# Patient Record
Sex: MALE | Race: BLACK OR AFRICAN AMERICAN | NOT HISPANIC OR LATINO | Employment: OTHER | URBAN - METROPOLITAN AREA
[De-identification: names, ages, dates, MRNs, and addresses within clinical notes are randomized per-mention and may not be internally consistent; named-entity substitution may affect disease eponyms.]

---

## 2019-08-12 DIAGNOSIS — I50.9 HEART FAILURE, UNSPECIFIED: Primary | ICD-10-CM

## 2019-08-12 DIAGNOSIS — I48.0 PAROXYSMAL ATRIAL FIBRILLATION: ICD-10-CM

## 2019-08-20 ENCOUNTER — HOSPITAL ENCOUNTER (OUTPATIENT)
Dept: RADIOLOGY | Facility: HOSPITAL | Age: 69
Discharge: HOME | End: 2019-08-20
Attending: INTERNAL MEDICINE
Payer: MEDICARE

## 2019-08-20 DIAGNOSIS — I48.0 PAROXYSMAL ATRIAL FIBRILLATION: ICD-10-CM

## 2019-08-20 DIAGNOSIS — I50.9 HEART FAILURE, UNSPECIFIED: ICD-10-CM

## 2019-08-20 PROCEDURE — 78472 GATED HEART PLANAR SINGLE: CPT | Mod: TC

## 2019-09-12 ENCOUNTER — HOSPITAL ENCOUNTER (OUTPATIENT)
Dept: PREADMISSION TESTING | Facility: HOSPITAL | Age: 69
Discharge: HOME | End: 2019-09-12
Attending: INTERNAL MEDICINE
Payer: MEDICARE

## 2019-09-12 VITALS
WEIGHT: 150 LBS | RESPIRATION RATE: 18 BRPM | HEIGHT: 68 IN | BODY MASS INDEX: 22.73 KG/M2 | SYSTOLIC BLOOD PRESSURE: 137 MMHG | HEART RATE: 58 BPM | OXYGEN SATURATION: 98 % | DIASTOLIC BLOOD PRESSURE: 83 MMHG

## 2019-09-12 DIAGNOSIS — I42.9 CARDIOMYOPATHY: Primary | ICD-10-CM

## 2019-09-12 LAB
ANION GAP SERPL CALC-SCNC: 6 MMOL/L (ref 8–16)
APTT PPP: 36.6 SEC (ref 26.2–34.7)
BASOPHILS # BLD AUTO: 0.03 K/UL (ref 0–0.2)
BASOPHILS NFR BLD: 1 % (ref 0–1.9)
BUN SERPL-MCNC: 16 MG/DL (ref 8–23)
CALCIUM SERPL-MCNC: 8.1 MG/DL (ref 8.7–10.5)
CHLORIDE SERPL-SCNC: 107 MMOL/L (ref 95–110)
CO2 SERPL-SCNC: 25 MMOL/L (ref 23–29)
CREAT SERPL-MCNC: 0.9 MG/DL (ref 0.5–1.4)
DIFFERENTIAL METHOD: ABNORMAL
EOSINOPHIL # BLD AUTO: 0.1 K/UL (ref 0–0.5)
EOSINOPHIL NFR BLD: 4.2 % (ref 0–8)
ERYTHROCYTE [DISTWIDTH] IN BLOOD BY AUTOMATED COUNT: 14.4 % (ref 11.5–14.5)
EST. GFR  (AFRICAN AMERICAN): >60 ML/MIN/1.73 M^2
EST. GFR  (NON AFRICAN AMERICAN): >60 ML/MIN/1.73 M^2
GLUCOSE SERPL-MCNC: 130 MG/DL (ref 70–110)
HCT VFR BLD AUTO: 40 % (ref 40–54)
HGB BLD-MCNC: 12.1 G/DL (ref 14–18)
IMM GRANULOCYTES # BLD AUTO: 0.01 K/UL (ref 0–0.04)
IMM GRANULOCYTES NFR BLD AUTO: 0.3 % (ref 0–0.5)
INR PPP: 1.2
LYMPHOCYTES # BLD AUTO: 0.4 K/UL (ref 1–4.8)
LYMPHOCYTES NFR BLD: 13.5 % (ref 18–48)
MAGNESIUM SERPL-MCNC: 1.9 MG/DL (ref 1.6–2.6)
MCH RBC QN AUTO: 23.9 PG (ref 27–31)
MCHC RBC AUTO-ENTMCNC: 30.3 G/DL (ref 32–36)
MCV RBC AUTO: 79 FL (ref 82–98)
MONOCYTES # BLD AUTO: 0.3 K/UL (ref 0.3–1)
MONOCYTES NFR BLD: 10.6 % (ref 4–15)
NEUTROPHILS # BLD AUTO: 2.2 K/UL (ref 1.8–7.7)
NEUTROPHILS NFR BLD: 70.4 % (ref 38–73)
NRBC BLD-RTO: 0 /100 WBC
PLATELET # BLD AUTO: 204 K/UL (ref 150–350)
PMV BLD AUTO: 10.8 FL (ref 9.2–12.9)
POTASSIUM SERPL-SCNC: 3.5 MMOL/L (ref 3.5–5.1)
PROTHROMBIN TIME: 14.5 SEC (ref 11.7–14)
RBC # BLD AUTO: 5.07 M/UL (ref 4.6–6.2)
SODIUM SERPL-SCNC: 138 MMOL/L (ref 136–145)
WBC # BLD AUTO: 3.11 K/UL (ref 3.9–12.7)

## 2019-09-12 PROCEDURE — 80048 BASIC METABOLIC PNL TOTAL CA: CPT

## 2019-09-12 PROCEDURE — 85730 THROMBOPLASTIN TIME PARTIAL: CPT | Mod: GZ

## 2019-09-12 PROCEDURE — 83735 ASSAY OF MAGNESIUM: CPT

## 2019-09-12 PROCEDURE — 36415 COLL VENOUS BLD VENIPUNCTURE: CPT

## 2019-09-12 PROCEDURE — 85610 PROTHROMBIN TIME: CPT

## 2019-09-12 PROCEDURE — 85025 COMPLETE CBC W/AUTO DIFF WBC: CPT

## 2019-09-16 ENCOUNTER — HOSPITAL ENCOUNTER (OUTPATIENT)
Facility: HOSPITAL | Age: 69
Discharge: HOME | End: 2019-09-17
Attending: INTERNAL MEDICINE | Admitting: INTERNAL MEDICINE
Payer: MEDICARE

## 2019-09-16 DIAGNOSIS — I42.9 CARDIOMYOPATHY, UNSPECIFIED TYPE: ICD-10-CM

## 2019-09-16 DIAGNOSIS — I48.91 ATRIAL FIBRILLATION, UNSPECIFIED TYPE: ICD-10-CM

## 2019-09-16 DIAGNOSIS — I25.10 CORONARY ARTERY DISEASE, ANGINA PRESENCE UNSPECIFIED, UNSPECIFIED VESSEL OR LESION TYPE, UNSPECIFIED WHETHER NATIVE OR TRANSPLANTED HEART: Primary | ICD-10-CM

## 2019-09-16 LAB
POC ACTIVATED CLOTTING TIME K: 230 SEC (ref 74–137)
POC ACTIVATED CLOTTING TIME K: 263 SEC (ref 74–137)
SAMPLE: ABNORMAL
SAMPLE: ABNORMAL

## 2019-09-16 PROCEDURE — 93454 CORONARY ARTERY ANGIO S&I: CPT | Mod: XU | Performed by: INTERNAL MEDICINE

## 2019-09-16 PROCEDURE — 27800903 OPTIME MED/SURG SUP & DEVICES OTHER IMPLANTS: Performed by: INTERNAL MEDICINE

## 2019-09-16 PROCEDURE — C1894 INTRO/SHEATH, NON-LASER: HCPCS | Performed by: INTERNAL MEDICINE

## 2019-09-16 PROCEDURE — 25000003 PHARM REV CODE 250: Performed by: INTERNAL MEDICINE

## 2019-09-16 PROCEDURE — C1769 GUIDE WIRE: HCPCS | Performed by: INTERNAL MEDICINE

## 2019-09-16 PROCEDURE — C1887 CATHETER, GUIDING: HCPCS | Performed by: INTERNAL MEDICINE

## 2019-09-16 PROCEDURE — C1725 CATH, TRANSLUMIN NON-LASER: HCPCS | Performed by: INTERNAL MEDICINE

## 2019-09-16 PROCEDURE — 63600175 PHARM REV CODE 636 W HCPCS: Performed by: INTERNAL MEDICINE

## 2019-09-16 PROCEDURE — C9601 PERC DRUG-EL COR STENT BRAN: HCPCS | Performed by: INTERNAL MEDICINE

## 2019-09-16 PROCEDURE — C9600 PERC DRUG-EL COR STENT SING: HCPCS | Performed by: INTERNAL MEDICINE

## 2019-09-16 PROCEDURE — 93571 IV DOP VEL&/PRESS C FLO 1ST: CPT | Performed by: INTERNAL MEDICINE

## 2019-09-16 PROCEDURE — 99153 MOD SED SAME PHYS/QHP EA: CPT | Performed by: INTERNAL MEDICINE

## 2019-09-16 PROCEDURE — C1874 STENT, COATED/COV W/DEL SYS: HCPCS | Performed by: INTERNAL MEDICINE

## 2019-09-16 PROCEDURE — 94761 N-INVAS EAR/PLS OXIMETRY MLT: CPT

## 2019-09-16 PROCEDURE — C1760 CLOSURE DEV, VASC: HCPCS | Performed by: INTERNAL MEDICINE

## 2019-09-16 PROCEDURE — 99152 MOD SED SAME PHYS/QHP 5/>YRS: CPT | Performed by: INTERNAL MEDICINE

## 2019-09-16 DEVICE — DEVICE CLOSURE VASCADE 5FR: Type: IMPLANTABLE DEVICE | Site: CORONARY | Status: FUNCTIONAL

## 2019-09-16 DEVICE — IMPLANTABLE DEVICE: Type: IMPLANTABLE DEVICE | Site: CORONARY | Status: FUNCTIONAL

## 2019-09-16 RX ORDER — MIDAZOLAM HYDROCHLORIDE 1 MG/ML
INJECTION INTRAMUSCULAR; INTRAVENOUS
Status: DISCONTINUED | OUTPATIENT
Start: 2019-09-16 | End: 2019-09-16

## 2019-09-16 RX ORDER — PREDNISOLONE ACETATE 10 MG/ML
1 SUSPENSION/ DROPS OPHTHALMIC 4 TIMES DAILY PRN
COMMUNITY

## 2019-09-16 RX ORDER — CLOPIDOGREL BISULFATE 75 MG/1
TABLET ORAL
Status: DISCONTINUED | OUTPATIENT
Start: 2019-09-16 | End: 2019-09-16

## 2019-09-16 RX ORDER — SODIUM CHLORIDE 9 MG/ML
INJECTION, SOLUTION INTRAVENOUS CONTINUOUS
Status: DISCONTINUED | OUTPATIENT
Start: 2019-09-16 | End: 2019-09-17 | Stop reason: HOSPADM

## 2019-09-16 RX ORDER — ADENOSINE 3 MG/ML
INJECTION, SOLUTION INTRAVENOUS
Status: DISCONTINUED | OUTPATIENT
Start: 2019-09-16 | End: 2019-09-16

## 2019-09-16 RX ORDER — VERAPAMIL HYDROCHLORIDE 2.5 MG/ML
INJECTION, SOLUTION INTRAVENOUS
Status: DISCONTINUED | OUTPATIENT
Start: 2019-09-16 | End: 2019-09-16

## 2019-09-16 RX ORDER — POTASSIUM CHLORIDE 20 MEQ/1
20 TABLET, EXTENDED RELEASE ORAL ONCE
Status: COMPLETED | OUTPATIENT
Start: 2019-09-16 | End: 2019-09-16

## 2019-09-16 RX ORDER — FINASTERIDE 5 MG/1
5 TABLET, FILM COATED ORAL DAILY
COMMUNITY

## 2019-09-16 RX ORDER — FUROSEMIDE 20 MG/1
20 TABLET ORAL 2 TIMES DAILY
Status: ON HOLD | COMMUNITY
End: 2019-09-17 | Stop reason: SDUPTHER

## 2019-09-16 RX ORDER — CLOPIDOGREL BISULFATE 75 MG/1
75 TABLET ORAL DAILY
Status: DISCONTINUED | OUTPATIENT
Start: 2019-09-17 | End: 2019-09-17 | Stop reason: HOSPADM

## 2019-09-16 RX ORDER — LANOLIN ALCOHOL/MO/W.PET/CERES
400 CREAM (GRAM) TOPICAL ONCE
Status: DISCONTINUED | OUTPATIENT
Start: 2019-09-16 | End: 2019-09-16

## 2019-09-16 RX ORDER — NAPROXEN SODIUM 220 MG/1
324 TABLET, FILM COATED ORAL ONCE
Status: COMPLETED | OUTPATIENT
Start: 2019-09-16 | End: 2019-09-16

## 2019-09-16 RX ORDER — ASPIRIN 81 MG/1
81 TABLET ORAL DAILY
COMMUNITY
End: 2022-10-28

## 2019-09-16 RX ORDER — LOSARTAN POTASSIUM 25 MG/1
25 TABLET ORAL DAILY
COMMUNITY

## 2019-09-16 RX ORDER — HYDROCODONE BITARTRATE AND ACETAMINOPHEN 5; 325 MG/1; MG/1
1 TABLET ORAL EVERY 4 HOURS PRN
Status: DISCONTINUED | OUTPATIENT
Start: 2019-09-16 | End: 2019-09-17 | Stop reason: HOSPADM

## 2019-09-16 RX ORDER — FOLIC ACID 1 MG/1
1 TABLET ORAL DAILY
Status: DISCONTINUED | OUTPATIENT
Start: 2019-09-16 | End: 2019-09-17 | Stop reason: HOSPADM

## 2019-09-16 RX ORDER — METHOTREXATE 2.5 MG/1
2.5 TABLET ORAL
COMMUNITY

## 2019-09-16 RX ORDER — NITROGLYCERIN 5 MG/ML
INJECTION, SOLUTION INTRAVENOUS
Status: DISCONTINUED | OUTPATIENT
Start: 2019-09-16 | End: 2019-09-16

## 2019-09-16 RX ORDER — FENTANYL CITRATE 50 UG/ML
INJECTION, SOLUTION INTRAMUSCULAR; INTRAVENOUS
Status: DISCONTINUED | OUTPATIENT
Start: 2019-09-16 | End: 2019-09-16

## 2019-09-16 RX ORDER — POTASSIUM CHLORIDE 750 MG/1
20 TABLET, EXTENDED RELEASE ORAL ONCE
Status: ON HOLD | COMMUNITY
End: 2020-01-31 | Stop reason: HOSPADM

## 2019-09-16 RX ORDER — METOPROLOL SUCCINATE 25 MG/1
25 TABLET, EXTENDED RELEASE ORAL DAILY
COMMUNITY

## 2019-09-16 RX ORDER — FINASTERIDE 5 MG/1
5 TABLET, FILM COATED ORAL DAILY
Status: DISCONTINUED | OUTPATIENT
Start: 2019-09-16 | End: 2019-09-17 | Stop reason: HOSPADM

## 2019-09-16 RX ORDER — FOLIC ACID 1 MG/1
1 TABLET ORAL DAILY
COMMUNITY
End: 2022-12-08

## 2019-09-16 RX ORDER — POTASSIUM CHLORIDE 20 MEQ/1
40 TABLET, EXTENDED RELEASE ORAL ONCE
Status: DISCONTINUED | OUTPATIENT
Start: 2019-09-16 | End: 2019-09-16

## 2019-09-16 RX ORDER — MORPHINE SULFATE 2 MG/ML
2 INJECTION, SOLUTION INTRAMUSCULAR; INTRAVENOUS EVERY 6 HOURS PRN
Status: DISCONTINUED | OUTPATIENT
Start: 2019-09-16 | End: 2019-09-17 | Stop reason: HOSPADM

## 2019-09-16 RX ORDER — HEPARIN SODIUM 1000 [USP'U]/ML
INJECTION, SOLUTION INTRAVENOUS; SUBCUTANEOUS
Status: DISCONTINUED | OUTPATIENT
Start: 2019-09-16 | End: 2019-09-16

## 2019-09-16 RX ORDER — METOPROLOL SUCCINATE 50 MG/1
50 TABLET, EXTENDED RELEASE ORAL DAILY
Status: DISCONTINUED | OUTPATIENT
Start: 2019-09-16 | End: 2019-09-17 | Stop reason: HOSPADM

## 2019-09-16 RX ORDER — SODIUM CHLORIDE 450 MG/100ML
INJECTION, SOLUTION INTRAVENOUS CONTINUOUS
Status: DISCONTINUED | OUTPATIENT
Start: 2019-09-16 | End: 2019-09-16

## 2019-09-16 RX ORDER — LIDOCAINE HYDROCHLORIDE 10 MG/ML
INJECTION, SOLUTION EPIDURAL; INFILTRATION; INTRACAUDAL; PERINEURAL
Status: DISCONTINUED | OUTPATIENT
Start: 2019-09-16 | End: 2019-09-16

## 2019-09-16 RX ORDER — SPIRONOLACTONE 25 MG/1
25 TABLET ORAL DAILY PRN
COMMUNITY
End: 2020-09-24 | Stop reason: SDUPTHER

## 2019-09-16 RX ORDER — OMEPRAZOLE 20 MG/1
20 CAPSULE, DELAYED RELEASE ORAL DAILY
Status: ON HOLD | COMMUNITY
End: 2019-09-17 | Stop reason: HOSPADM

## 2019-09-16 RX ORDER — LORATADINE 10 MG/1
10 TABLET ORAL DAILY
COMMUNITY

## 2019-09-16 RX ORDER — SPIRONOLACTONE 25 MG/1
25 TABLET ORAL DAILY
Status: DISCONTINUED | OUTPATIENT
Start: 2019-09-16 | End: 2019-09-17 | Stop reason: HOSPADM

## 2019-09-16 RX ORDER — LOSARTAN POTASSIUM 25 MG/1
25 TABLET ORAL DAILY
Status: DISCONTINUED | OUTPATIENT
Start: 2019-09-16 | End: 2019-09-17 | Stop reason: HOSPADM

## 2019-09-16 RX ORDER — LEVOTHYROXINE SODIUM 112 UG/1
112 TABLET ORAL
COMMUNITY
End: 2022-10-28 | Stop reason: SDUPTHER

## 2019-09-16 RX ORDER — DIPHENHYDRAMINE HCL 25 MG
25 CAPSULE ORAL ONCE
Status: COMPLETED | OUTPATIENT
Start: 2019-09-16 | End: 2019-09-16

## 2019-09-16 RX ORDER — THIAMINE HCL 100 MG
100 TABLET ORAL DAILY
Status: DISCONTINUED | OUTPATIENT
Start: 2019-09-16 | End: 2019-09-17 | Stop reason: HOSPADM

## 2019-09-16 RX ORDER — ACETAMINOPHEN 325 MG/1
650 TABLET ORAL EVERY 4 HOURS PRN
Status: DISCONTINUED | OUTPATIENT
Start: 2019-09-16 | End: 2019-09-17 | Stop reason: HOSPADM

## 2019-09-16 RX ORDER — ASPIRIN 325 MG
325 TABLET, DELAYED RELEASE (ENTERIC COATED) ORAL DAILY
Status: DISCONTINUED | OUTPATIENT
Start: 2019-09-17 | End: 2019-09-17 | Stop reason: HOSPADM

## 2019-09-16 RX ORDER — LANOLIN ALCOHOL/MO/W.PET/CERES
400 CREAM (GRAM) TOPICAL ONCE
Status: COMPLETED | OUTPATIENT
Start: 2019-09-16 | End: 2019-09-16

## 2019-09-16 RX ORDER — PANTOPRAZOLE SODIUM 40 MG/1
40 TABLET, DELAYED RELEASE ORAL DAILY
Status: DISCONTINUED | OUTPATIENT
Start: 2019-09-16 | End: 2019-09-17 | Stop reason: HOSPADM

## 2019-09-16 RX ORDER — LANOLIN ALCOHOL/MO/W.PET/CERES
100 CREAM (GRAM) TOPICAL DAILY
COMMUNITY
End: 2022-12-08

## 2019-09-16 RX ORDER — DIAZEPAM 5 MG/1
5 TABLET ORAL ONCE
Status: COMPLETED | OUTPATIENT
Start: 2019-09-16 | End: 2019-09-16

## 2019-09-16 RX ADMIN — SPIRONOLACTONE 25 MG: 25 TABLET, FILM COATED ORAL at 03:09

## 2019-09-16 RX ADMIN — HYDROCODONE BITARTRATE AND ACETAMINOPHEN 1 TABLET: 5; 325 TABLET ORAL at 03:09

## 2019-09-16 RX ADMIN — MAGNESIUM OXIDE 400 MG: 400 TABLET ORAL at 06:09

## 2019-09-16 RX ADMIN — SODIUM CHLORIDE: 0.45 INJECTION, SOLUTION INTRAVENOUS at 06:09

## 2019-09-16 RX ADMIN — FINASTERIDE 5 MG: 5 TABLET, FILM COATED ORAL at 03:09

## 2019-09-16 RX ADMIN — FOLIC ACID 1 MG: 1 TABLET ORAL at 03:09

## 2019-09-16 RX ADMIN — PANTOPRAZOLE SODIUM 40 MG: 40 TABLET, DELAYED RELEASE ORAL at 03:09

## 2019-09-16 RX ADMIN — LOSARTAN POTASSIUM 25 MG: 25 TABLET, FILM COATED ORAL at 03:09

## 2019-09-16 RX ADMIN — Medication 100 MG: at 03:09

## 2019-09-16 RX ADMIN — ASPIRIN 81 MG 324 MG: 81 TABLET ORAL at 06:09

## 2019-09-16 RX ADMIN — POTASSIUM CHLORIDE 20 MEQ: 20 TABLET, EXTENDED RELEASE ORAL at 06:09

## 2019-09-16 RX ADMIN — DIAZEPAM 5 MG: 5 TABLET ORAL at 06:09

## 2019-09-16 RX ADMIN — DIPHENHYDRAMINE HYDROCHLORIDE 25 MG: 25 CAPSULE ORAL at 06:09

## 2019-09-16 RX ADMIN — METOPROLOL SUCCINATE 50 MG: 50 TABLET, FILM COATED, EXTENDED RELEASE ORAL at 03:09

## 2019-09-16 NOTE — Clinical Note
290 ml injected throughout the case. 60 mL total wasted during the case. 350 mL total used in the case.

## 2019-09-16 NOTE — Clinical Note
Catheter is inserted into the ostium   left main. Angiography performed of the left coronary arteries in multiple views. Angiography performed via power injection with 8 mL contrast at 4 mL/s.

## 2019-09-16 NOTE — Clinical Note
Catheter is inserted into the ostium   right coronary artery. Angiography performed of the right coronary arteries in multiple views. Angiography performed via power injection with 6 mL contrast at 3 mL/s. SOLE

## 2019-09-16 NOTE — PROGRESS NOTES
Mr. Madrid is sitting up on the side of the bed s/p [facility] s/p PCI x 2 DARIN this morning. Denies chest pain or shortness of breath. Right wrist and right groin sites are CDI with no swelling, oozing, bruising or hematoma.

## 2019-09-16 NOTE — Clinical Note
Catheter is inserted into the ostium   left main. Angiography performed of the left coronary arteries in multiple views. Angiography performed via power injection with 8 mL contrast at 4 mL/s. JL4

## 2019-09-16 NOTE — Clinical Note
Catheter is inserted into the aorta. Angiography performed of the left coronary arteries in multiple views. Angiography performed via power injection with 8 mL contrast at 4 mL/s.

## 2019-09-17 VITALS
BODY MASS INDEX: 22.72 KG/M2 | HEART RATE: 58 BPM | RESPIRATION RATE: 39 BRPM | WEIGHT: 149.94 LBS | HEIGHT: 68 IN | TEMPERATURE: 98 F | DIASTOLIC BLOOD PRESSURE: 81 MMHG | SYSTOLIC BLOOD PRESSURE: 157 MMHG | OXYGEN SATURATION: 74 %

## 2019-09-17 PROCEDURE — 94761 N-INVAS EAR/PLS OXIMETRY MLT: CPT

## 2019-09-17 PROCEDURE — 25000003 PHARM REV CODE 250: Performed by: INTERNAL MEDICINE

## 2019-09-17 RX ORDER — PANTOPRAZOLE SODIUM 40 MG/1
40 TABLET, DELAYED RELEASE ORAL DAILY
Qty: 30 TABLET | Refills: 11 | Status: SHIPPED | OUTPATIENT
Start: 2019-09-18 | End: 2020-09-17

## 2019-09-17 RX ORDER — FUROSEMIDE 20 MG/1
20 TABLET ORAL DAILY
Qty: 30 TABLET | Refills: 3 | Status: SHIPPED | OUTPATIENT
Start: 2019-09-17

## 2019-09-17 RX ADMIN — FOLIC ACID 1 MG: 1 TABLET ORAL at 09:09

## 2019-09-17 RX ADMIN — CLOPIDOGREL BISULFATE 75 MG: 75 TABLET, FILM COATED ORAL at 09:09

## 2019-09-17 RX ADMIN — SPIRONOLACTONE 25 MG: 25 TABLET, FILM COATED ORAL at 09:09

## 2019-09-17 RX ADMIN — Medication 100 MG: at 09:09

## 2019-09-17 RX ADMIN — FINASTERIDE 5 MG: 5 TABLET, FILM COATED ORAL at 09:09

## 2019-09-17 RX ADMIN — METOPROLOL SUCCINATE 50 MG: 50 TABLET, FILM COATED, EXTENDED RELEASE ORAL at 09:09

## 2019-09-17 RX ADMIN — PANTOPRAZOLE SODIUM 40 MG: 40 TABLET, DELAYED RELEASE ORAL at 09:09

## 2019-09-17 RX ADMIN — LOSARTAN POTASSIUM 25 MG: 25 TABLET, FILM COATED ORAL at 09:09

## 2019-09-17 RX ADMIN — ASPIRIN 325 MG: 325 TABLET, COATED ORAL at 09:09

## 2019-09-17 NOTE — PLAN OF CARE
09/17/19 0733   Patient Assessment/Suction   Level of Consciousness (AVPU) alert   Respiratory Effort Normal;Unlabored   Expansion/Accessory Muscles/Retractions no use of accessory muscles;no retractions   Rhythm/Pattern, Respiratory unlabored;pattern regular;depth regular   PRE-TX-O2   O2 Device (Oxygen Therapy) room air   SpO2 (!) 93 %   Pulse Oximetry Type Continuous   $ Pulse Oximetry - Multiple Charge Pulse Oximetry - Multiple   Pulse (!) 59   Resp (!) 23

## 2019-09-17 NOTE — PROGRESS NOTES
Cardiac Rehab     Trevor Madrid   [id number]   9/17/2019         Cardiac Rehab Phase Taught: Phase 1    Teaching Method: Verbal, Written    Handouts: Cardiac Rehab Tear Sheet and Stent Card    Educational Videos: None    Understanding:  Learning indicated by feedback and Verbalize understanding    Comments: Dr Romero at bedside, discussed coronary stents, plavix, lifevest, . Questions answered    Total time spent: 25mins              Sydni Shepherd RN

## 2019-09-18 ENCOUNTER — TELEPHONE (OUTPATIENT)
Dept: CARDIAC REHAB | Facility: HOSPITAL | Age: 69
End: 2019-09-18

## 2019-09-18 NOTE — DISCHARGE SUMMARY
[facility]  Short Stay  Discharge Summary    Admit Date: 9/16/2019    Discharge Date and Time:  09/17/2019        Discharge Attending Physician: Heather Machado Course:  Patient was admitted to the hospital after he had an electively scheduled left heart catheterization secondary to his cardiomyopathy.  He was noted to have a significant LAD and diagonal disease.  FFR was positive for the LAD lesion and he had a drug-eluting stent placed in the mid LAD.  He also had a drug-eluting stent placed in the diagonal coronary artery.  No early complications were noted and he was discharged home in stable medical condition.    Post angiography discharge instructions were discussed at length with the patient.  Importance of being compliant with dual antiplatelet therapy was stressed upon the patient.    Since the patient had a PCI performed today he would need LifeVest extension for another 3-4 months prior to placement of an ICD for primary prevention plan.    Right wrist and right groin looked good with no hematoma or bleeding.      Final Diagnoses:    Principal Problem:  Coronary artery disease, Cardiomyopathy.   Secondary Diagnoses:  Hypertension.  Active Hospital Problems    Diagnosis  POA    Cardiomyopathy [I42.9]  Yes     Priority: High      Resolved Hospital Problems   No resolved problems to display.       Discharged Condition: good    Disposition: Home or Self Care    Follow up/Patient Instructions:    Medications:  Reconciled Home Medications:      Medication List      START taking these medications    pantoprazole 40 MG tablet  Commonly known as:  PROTONIX  Take 1 tablet (40 mg total) by mouth once daily.  Start taking on:  9/18/2019  Replaces:  omeprazole 20 MG capsule        CHANGE how you take these medications    furosemide 20 MG tablet  Commonly known as:  LASIX  Take 1 tablet (20 mg total) by mouth once daily.  What changed:  when to take this        CONTINUE taking these medications     aspirin 81 MG EC tablet  Commonly known as:  ECOTRIN  Take 81 mg by mouth once daily.     ELIQUIS 5 mg Tab  Generic drug:  apixaban  Take 5 mg by mouth 2 (two) times daily.     finasteride 5 mg tablet  Commonly known as:  PROSCAR  Take 5 mg by mouth once daily.     folic acid 1 MG tablet  Commonly known as:  FOLVITE  Take 1 mg by mouth once daily.     levothyroxine 100 MCG tablet  Commonly known as:  SYNTHROID  Take 100 mcg by mouth once daily.     loratadine 10 mg tablet  Commonly known as:  CLARITIN  Take 10 mg by mouth once daily.     losartan 25 MG tablet  Commonly known as:  COZAAR  Take 25 mg by mouth once daily.     methotrexate 2.5 MG Tab  Take by mouth every 7 days.     metoprolol succinate 50 MG 24 hr tablet  Commonly known as:  TOPROL-XL  Take 50 mg by mouth once daily.     potassium chloride 10 MEQ Tbsr  Commonly known as:  KLOR-CON  Take 20 mEq by mouth once.     prednisoLONE acetate 1 % Drps  Commonly known as:  PRED FORTE  1 drop 4 (four) times daily as needed.     spironolactone 25 MG tablet  Commonly known as:  ALDACTONE  Take 25 mg by mouth once daily.     thiamine 100 MG tablet  Take 100 mg by mouth once daily.        STOP taking these medications    omeprazole 20 MG capsule  Commonly known as:  PRILOSEC  Replaced by:  pantoprazole 40 MG tablet          Plavix 75 mg daily.     Discharge Procedure Orders   Diet Cardiac     Lifting restrictions   Order Comments: NO HEAVY LIFTING > 10 LBS x 1 WEEK     Call MD for:  temperature >100.4     Call MD for:  persistent nausea and vomiting     Call MD for:  severe uncontrolled pain     Call MD for:  difficulty breathing, headache or visual disturbances     Call MD for:  redness, tenderness, or signs of infection (pain, swelling, redness, odor or green/yellow discharge around incision site)     Call MD for:  hives     Call MD for:  persistent dizziness or light-headedness     Follow-up Information     Gerald Romero MD In 1 week.    Specialties:   Cardiovascular Disease, Cardiology  Contact information:  [address] [facility]  [address]  [place] [address]  [phone]

## 2020-01-27 ENCOUNTER — HOSPITAL ENCOUNTER (OUTPATIENT)
Dept: PREADMISSION TESTING | Facility: HOSPITAL | Age: 70
Discharge: HOME | End: 2020-01-27
Attending: INTERNAL MEDICINE
Payer: MEDICARE

## 2020-01-27 ENCOUNTER — HOSPITAL ENCOUNTER (OUTPATIENT)
Dept: RADIOLOGY | Facility: HOSPITAL | Age: 70
Discharge: HOME | End: 2020-01-27
Attending: INTERNAL MEDICINE
Payer: MEDICARE

## 2020-01-27 VITALS — WEIGHT: 157.88 LBS | BODY MASS INDEX: 23.93 KG/M2 | HEIGHT: 68 IN

## 2020-01-27 DIAGNOSIS — I25.10 CAD (CORONARY ARTERY DISEASE): ICD-10-CM

## 2020-01-27 DIAGNOSIS — Z01.810 PRE-PROCEDURAL CARDIOVASCULAR EXAMINATION: Primary | ICD-10-CM

## 2020-01-27 LAB
ANION GAP SERPL CALC-SCNC: 9 MMOL/L (ref 8–16)
APTT PPP: 36 SEC (ref 23.6–33.3)
BASOPHILS # BLD AUTO: 0.03 K/UL (ref 0–0.2)
BASOPHILS NFR BLD: 1.1 % (ref 0–1.9)
BUN SERPL-MCNC: 15 MG/DL (ref 8–23)
CALCIUM SERPL-MCNC: 8.6 MG/DL (ref 8.7–10.5)
CHLORIDE SERPL-SCNC: 106 MMOL/L (ref 95–110)
CO2 SERPL-SCNC: 23 MMOL/L (ref 23–29)
CREAT SERPL-MCNC: 0.8 MG/DL (ref 0.5–1.4)
DIFFERENTIAL METHOD: ABNORMAL
EOSINOPHIL # BLD AUTO: 0.1 K/UL (ref 0–0.5)
EOSINOPHIL NFR BLD: 3.5 % (ref 0–8)
ERYTHROCYTE [DISTWIDTH] IN BLOOD BY AUTOMATED COUNT: 13.4 % (ref 11.5–14.5)
EST. GFR  (AFRICAN AMERICAN): >60 ML/MIN/1.73 M^2
EST. GFR  (NON AFRICAN AMERICAN): >60 ML/MIN/1.73 M^2
GLUCOSE SERPL-MCNC: 109 MG/DL (ref 70–110)
HCT VFR BLD AUTO: 41.4 % (ref 40–54)
HGB BLD-MCNC: 12.5 G/DL (ref 14–18)
IMM GRANULOCYTES # BLD AUTO: 0.01 K/UL (ref 0–0.04)
IMM GRANULOCYTES NFR BLD AUTO: 0.4 % (ref 0–0.5)
INR PPP: 1.1
LYMPHOCYTES # BLD AUTO: 0.4 K/UL (ref 1–4.8)
LYMPHOCYTES NFR BLD: 14.5 % (ref 18–48)
MAGNESIUM SERPL-MCNC: 2.1 MG/DL (ref 1.6–2.6)
MCH RBC QN AUTO: 23.6 PG (ref 27–31)
MCHC RBC AUTO-ENTMCNC: 30.2 G/DL (ref 32–36)
MCV RBC AUTO: 78 FL (ref 82–98)
MONOCYTES # BLD AUTO: 0.3 K/UL (ref 0.3–1)
MONOCYTES NFR BLD: 11 % (ref 4–15)
MRSA SCREEN BY PCR: NEGATIVE
NEUTROPHILS # BLD AUTO: 2 K/UL (ref 1.8–7.7)
NEUTROPHILS NFR BLD: 69.5 % (ref 38–73)
NRBC BLD-RTO: 0 /100 WBC
PLATELET # BLD AUTO: 204 K/UL (ref 150–350)
PMV BLD AUTO: 11.1 FL (ref 9.2–12.9)
POTASSIUM SERPL-SCNC: 4.2 MMOL/L (ref 3.5–5.1)
PROTHROMBIN TIME: 13.7 SEC (ref 10.6–14.8)
RBC # BLD AUTO: 5.3 M/UL (ref 4.6–6.2)
SODIUM SERPL-SCNC: 138 MMOL/L (ref 136–145)
WBC # BLD AUTO: 2.82 K/UL (ref 3.9–12.7)

## 2020-01-27 PROCEDURE — 85730 THROMBOPLASTIN TIME PARTIAL: CPT

## 2020-01-27 PROCEDURE — 87641 MR-STAPH DNA AMP PROBE: CPT

## 2020-01-27 PROCEDURE — 80048 BASIC METABOLIC PNL TOTAL CA: CPT

## 2020-01-27 PROCEDURE — 36415 COLL VENOUS BLD VENIPUNCTURE: CPT

## 2020-01-27 PROCEDURE — 71046 X-RAY EXAM CHEST 2 VIEWS: CPT | Mod: TC

## 2020-01-27 PROCEDURE — 83735 ASSAY OF MAGNESIUM: CPT

## 2020-01-27 PROCEDURE — 85610 PROTHROMBIN TIME: CPT

## 2020-01-27 PROCEDURE — 93005 ELECTROCARDIOGRAM TRACING: CPT

## 2020-01-27 PROCEDURE — 85025 COMPLETE CBC W/AUTO DIFF WBC: CPT

## 2020-01-27 RX ORDER — CLOPIDOGREL BISULFATE 75 MG/1
75 TABLET ORAL DAILY
COMMUNITY
End: 2021-03-22 | Stop reason: SDUPTHER

## 2020-01-27 NOTE — DISCHARGE INSTRUCTIONS
 Nothing to eat or drink after midnight the night before your procedure.   Do not take any medications the morning of your procedure   Bring all your medications with you in the original pill bottles from pharmacy.   If you take blood thinners, ask your doctor if you should stop taking them.   Do your Hibiclens wash the night before and morning of your procedure.   If you use a CPAP or BiPAP at home, please bring it with you the day of your procedure.   Make arrangements for someone you know to drive you home after your procedure. Taxi and Uber are not acceptable.

## 2020-01-29 ENCOUNTER — HOSPITAL ENCOUNTER (OUTPATIENT)
Facility: HOSPITAL | Age: 70
Discharge: HOME | End: 2020-01-31
Attending: INTERNAL MEDICINE | Admitting: INTERNAL MEDICINE
Payer: MEDICARE

## 2020-01-29 DIAGNOSIS — I50.9 HEART FAILURE, UNSPECIFIED HF CHRONICITY, UNSPECIFIED HEART FAILURE TYPE: ICD-10-CM

## 2020-01-29 DIAGNOSIS — I25.10 CORONARY ARTERY DISEASE INVOLVING NATIVE CORONARY ARTERY OF NATIVE HEART WITHOUT ANGINA PECTORIS: ICD-10-CM

## 2020-01-29 DIAGNOSIS — I49.9 ARRHYTHMIA: ICD-10-CM

## 2020-01-29 PROCEDURE — 33249 INSJ/RPLCMT DEFIB W/LEAD(S): CPT | Performed by: INTERNAL MEDICINE

## 2020-01-29 PROCEDURE — 25500020 PHARM REV CODE 255: Performed by: INTERNAL MEDICINE

## 2020-01-29 PROCEDURE — 27201423 OPTIME MED/SURG SUP & DEVICES STERILE SUPPLY: Performed by: INTERNAL MEDICINE

## 2020-01-29 PROCEDURE — C1777 LEAD, AICD, ENDO SINGLE COIL: HCPCS | Performed by: INTERNAL MEDICINE

## 2020-01-29 PROCEDURE — 63600175 PHARM REV CODE 636 W HCPCS: Performed by: INTERNAL MEDICINE

## 2020-01-29 PROCEDURE — 93005 ELECTROCARDIOGRAM TRACING: CPT

## 2020-01-29 PROCEDURE — C1721 AICD, DUAL CHAMBER: HCPCS | Performed by: INTERNAL MEDICINE

## 2020-01-29 PROCEDURE — 25000003 PHARM REV CODE 250: Performed by: INTERNAL MEDICINE

## 2020-01-29 PROCEDURE — 99152 MOD SED SAME PHYS/QHP 5/>YRS: CPT | Performed by: INTERNAL MEDICINE

## 2020-01-29 PROCEDURE — 99153 MOD SED SAME PHYS/QHP EA: CPT | Performed by: INTERNAL MEDICINE

## 2020-01-29 PROCEDURE — C1894 INTRO/SHEATH, NON-LASER: HCPCS | Performed by: INTERNAL MEDICINE

## 2020-01-29 PROCEDURE — C1898 LEAD, PMKR, OTHER THAN TRANS: HCPCS | Performed by: INTERNAL MEDICINE

## 2020-01-29 DEVICE — ICD DDMB1D4 EVERA MRI DR XT DF4 US
Type: IMPLANTABLE DEVICE | Site: HEART | Status: FUNCTIONAL
Brand: EVERA MRI™ XT DR SURESCAN®

## 2020-01-29 DEVICE — LEAD 407645 CAPSUREFIX NOVUS US MRI
Type: IMPLANTABLE DEVICE | Site: HEART | Status: FUNCTIONAL
Brand: CAPSUREFIX NOVUS MRI™ SURESCAN™

## 2020-01-29 DEVICE — LEAD 6935M62 QUATTRO SECURE S MRI US
Type: IMPLANTABLE DEVICE | Site: HEART | Status: FUNCTIONAL
Brand: SPRINT QUATTRO SECURE S MRI™ SURESCAN™

## 2020-01-29 RX ORDER — LIDOCAINE HYDROCHLORIDE 10 MG/ML
INJECTION, SOLUTION EPIDURAL; INFILTRATION; INTRACAUDAL; PERINEURAL
Status: DISCONTINUED | OUTPATIENT
Start: 2020-01-29 | End: 2020-01-29 | Stop reason: HOSPADM

## 2020-01-29 RX ORDER — CEFAZOLIN SODIUM 2 G/50ML
2 SOLUTION INTRAVENOUS
Status: COMPLETED | OUTPATIENT
Start: 2020-01-29 | End: 2020-01-30

## 2020-01-29 RX ORDER — CEFAZOLIN SODIUM 2 G/50ML
2 SOLUTION INTRAVENOUS ONCE
Status: DISCONTINUED | OUTPATIENT
Start: 2020-01-29 | End: 2020-01-30

## 2020-01-29 RX ORDER — CEFAZOLIN SODIUM 1 G/3ML
INJECTION, POWDER, FOR SOLUTION INTRAMUSCULAR; INTRAVENOUS
Status: DISCONTINUED | OUTPATIENT
Start: 2020-01-29 | End: 2020-01-29 | Stop reason: HOSPADM

## 2020-01-29 RX ORDER — MIDAZOLAM HYDROCHLORIDE 1 MG/ML
INJECTION INTRAMUSCULAR; INTRAVENOUS
Status: DISCONTINUED | OUTPATIENT
Start: 2020-01-29 | End: 2020-01-29 | Stop reason: HOSPADM

## 2020-01-29 RX ORDER — HYDROCODONE BITARTRATE AND ACETAMINOPHEN 5; 325 MG/1; MG/1
1 TABLET ORAL EVERY 4 HOURS PRN
Status: DISCONTINUED | OUTPATIENT
Start: 2020-01-29 | End: 2020-01-31 | Stop reason: HOSPADM

## 2020-01-29 RX ORDER — LANOLIN ALCOHOL/MO/W.PET/CERES
1000 CREAM (GRAM) TOPICAL DAILY
COMMUNITY
End: 2022-12-08

## 2020-01-29 RX ORDER — LEFLUNOMIDE 20 MG/1
20 TABLET ORAL DAILY
COMMUNITY

## 2020-01-29 RX ORDER — FENTANYL CITRATE 50 UG/ML
INJECTION, SOLUTION INTRAMUSCULAR; INTRAVENOUS
Status: DISCONTINUED | OUTPATIENT
Start: 2020-01-29 | End: 2020-01-29 | Stop reason: HOSPADM

## 2020-01-29 RX ORDER — ACETAMINOPHEN 325 MG/1
650 TABLET ORAL EVERY 4 HOURS PRN
Status: DISCONTINUED | OUTPATIENT
Start: 2020-01-29 | End: 2020-01-31 | Stop reason: HOSPADM

## 2020-01-29 RX ADMIN — ACETAMINOPHEN 650 MG: 325 TABLET ORAL at 10:01

## 2020-01-29 RX ADMIN — HYDROCODONE BITARTRATE AND ACETAMINOPHEN 1 TABLET: 5; 325 TABLET ORAL at 04:01

## 2020-01-29 RX ADMIN — HYDROCODONE BITARTRATE AND ACETAMINOPHEN 1 TABLET: 5; 325 TABLET ORAL at 09:01

## 2020-01-29 RX ADMIN — CEFAZOLIN SODIUM 2 G: 2 SOLUTION INTRAVENOUS at 03:01

## 2020-01-29 RX ADMIN — CEFAZOLIN SODIUM 2 G: 2 SOLUTION INTRAVENOUS at 11:01

## 2020-01-29 NOTE — Clinical Note
A venogram was performed in the left subclavian vein. The vessel was injected with hand injection with 20 mL of contrast.

## 2020-01-29 NOTE — INTERVAL H&P NOTE
The patient has been examined and the H&P has been reviewed:    I concur with the findings and no changes have occurred since H&P was written.    Anesthesia/Surgery risks, benefits and alternative options discussed and understood by patient/family.          Active Hospital Problems    Diagnosis  POA    Heart failure [I50.9]  Yes     Priority: High      Resolved Hospital Problems   No resolved problems to display.

## 2020-01-29 NOTE — Clinical Note
Comfort measures given. Knees elevated on pillow. Will continue to provide safe structured and therapeutic milieu

## 2020-01-30 PROCEDURE — 63600175 PHARM REV CODE 636 W HCPCS: Performed by: INTERNAL MEDICINE

## 2020-01-30 PROCEDURE — 25000003 PHARM REV CODE 250: Performed by: INTERNAL MEDICINE

## 2020-01-30 RX ORDER — FOLIC ACID 1 MG/1
1 TABLET ORAL DAILY
Status: DISCONTINUED | OUTPATIENT
Start: 2020-01-31 | End: 2020-01-31 | Stop reason: HOSPADM

## 2020-01-30 RX ORDER — LOSARTAN POTASSIUM 25 MG/1
25 TABLET ORAL DAILY
Status: DISCONTINUED | OUTPATIENT
Start: 2020-01-31 | End: 2020-01-31 | Stop reason: HOSPADM

## 2020-01-30 RX ORDER — LANOLIN ALCOHOL/MO/W.PET/CERES
1000 CREAM (GRAM) TOPICAL DAILY
Status: DISCONTINUED | OUTPATIENT
Start: 2020-01-31 | End: 2020-01-31 | Stop reason: HOSPADM

## 2020-01-30 RX ORDER — LEVOTHYROXINE SODIUM 100 UG/1
100 TABLET ORAL DAILY
Status: DISCONTINUED | OUTPATIENT
Start: 2020-01-31 | End: 2020-01-31 | Stop reason: HOSPADM

## 2020-01-30 RX ORDER — FINASTERIDE 5 MG/1
5 TABLET, FILM COATED ORAL DAILY
Status: DISCONTINUED | OUTPATIENT
Start: 2020-01-31 | End: 2020-01-31 | Stop reason: HOSPADM

## 2020-01-30 RX ORDER — METOPROLOL SUCCINATE 25 MG/1
25 TABLET, EXTENDED RELEASE ORAL DAILY
Status: DISCONTINUED | OUTPATIENT
Start: 2020-01-31 | End: 2020-01-31 | Stop reason: HOSPADM

## 2020-01-30 RX ORDER — ASPIRIN 81 MG/1
81 TABLET ORAL DAILY
Status: DISCONTINUED | OUTPATIENT
Start: 2020-01-31 | End: 2020-01-31 | Stop reason: HOSPADM

## 2020-01-30 RX ORDER — THIAMINE HCL 100 MG
100 TABLET ORAL DAILY
Status: DISCONTINUED | OUTPATIENT
Start: 2020-01-31 | End: 2020-01-31 | Stop reason: HOSPADM

## 2020-01-30 RX ORDER — PANTOPRAZOLE SODIUM 40 MG/1
40 TABLET, DELAYED RELEASE ORAL DAILY
Status: DISCONTINUED | OUTPATIENT
Start: 2020-01-31 | End: 2020-01-31 | Stop reason: HOSPADM

## 2020-01-30 RX ORDER — CEFAZOLIN SODIUM 1 G/50ML
1 SOLUTION INTRAVENOUS
Status: COMPLETED | OUTPATIENT
Start: 2020-01-30 | End: 2020-01-31

## 2020-01-30 RX ADMIN — CEFAZOLIN SODIUM 1 G: 1 SOLUTION INTRAVENOUS at 05:01

## 2020-01-30 RX ADMIN — ACETAMINOPHEN 650 MG: 325 TABLET ORAL at 09:01

## 2020-01-30 RX ADMIN — ACETAMINOPHEN 650 MG: 325 TABLET ORAL at 01:01

## 2020-01-30 NOTE — PLAN OF CARE
Patient safety maintained, fall precautions in place. Pt pain managed with PRN meds. Patient left arm in sling with ice pack over site. Plan of care reviewed with patient.     Problem: Adult Inpatient Plan of Care  Goal: Plan of Care Review  Outcome: Ongoing, Progressing  Goal: Patient-Specific Goal (Individualization)  Outcome: Ongoing, Progressing  Goal: Absence of Hospital-Acquired Illness or Injury  Outcome: Ongoing, Progressing  Goal: Optimal Comfort and Wellbeing  Outcome: Ongoing, Progressing  Goal: Readiness for Transition of Care  Outcome: Ongoing, Progressing  Goal: Rounds/Family Conference  Outcome: Ongoing, Progressing     Problem: Fall Injury Risk  Goal: Absence of Fall and Fall-Related Injury  Outcome: Ongoing, Progressing

## 2020-01-30 NOTE — PROGRESS NOTES
Site of ICD implant with no hematoma or bleeding. CXR with no pneumothorax and the leads seem to be in appropriate place.

## 2020-01-31 VITALS
RESPIRATION RATE: 20 BRPM | BODY MASS INDEX: 23.67 KG/M2 | HEIGHT: 67 IN | OXYGEN SATURATION: 100 % | DIASTOLIC BLOOD PRESSURE: 77 MMHG | TEMPERATURE: 98 F | SYSTOLIC BLOOD PRESSURE: 138 MMHG | HEART RATE: 100 BPM | WEIGHT: 150.81 LBS

## 2020-01-31 LAB
ANION GAP SERPL CALC-SCNC: 10 MMOL/L (ref 8–16)
BASOPHILS # BLD AUTO: 0.03 K/UL (ref 0–0.2)
BASOPHILS NFR BLD: 1 % (ref 0–1.9)
BUN SERPL-MCNC: 15 MG/DL (ref 8–23)
CALCIUM SERPL-MCNC: 8.4 MG/DL (ref 8.7–10.5)
CHLORIDE SERPL-SCNC: 102 MMOL/L (ref 95–110)
CO2 SERPL-SCNC: 23 MMOL/L (ref 23–29)
CREAT SERPL-MCNC: 0.8 MG/DL (ref 0.5–1.4)
DIFFERENTIAL METHOD: ABNORMAL
EOSINOPHIL # BLD AUTO: 0.1 K/UL (ref 0–0.5)
EOSINOPHIL NFR BLD: 3.8 % (ref 0–8)
ERYTHROCYTE [DISTWIDTH] IN BLOOD BY AUTOMATED COUNT: 13.2 % (ref 11.5–14.5)
EST. GFR  (AFRICAN AMERICAN): >60 ML/MIN/1.73 M^2
EST. GFR  (NON AFRICAN AMERICAN): >60 ML/MIN/1.73 M^2
GLUCOSE SERPL-MCNC: 93 MG/DL (ref 70–110)
HCT VFR BLD AUTO: 41.1 % (ref 40–54)
HGB BLD-MCNC: 12.6 G/DL (ref 14–18)
IMM GRANULOCYTES # BLD AUTO: 0.01 K/UL (ref 0–0.04)
IMM GRANULOCYTES NFR BLD AUTO: 0.3 % (ref 0–0.5)
LYMPHOCYTES # BLD AUTO: 0.4 K/UL (ref 1–4.8)
LYMPHOCYTES NFR BLD: 12.2 % (ref 18–48)
MCH RBC QN AUTO: 23.3 PG (ref 27–31)
MCHC RBC AUTO-ENTMCNC: 30.7 G/DL (ref 32–36)
MCV RBC AUTO: 76 FL (ref 82–98)
MONOCYTES # BLD AUTO: 0.4 K/UL (ref 0.3–1)
MONOCYTES NFR BLD: 12.8 % (ref 4–15)
NEUTROPHILS # BLD AUTO: 2 K/UL (ref 1.8–7.7)
NEUTROPHILS NFR BLD: 69.9 % (ref 38–73)
NRBC BLD-RTO: 0 /100 WBC
PLATELET # BLD AUTO: 184 K/UL (ref 150–350)
PMV BLD AUTO: 11.5 FL (ref 9.2–12.9)
POTASSIUM SERPL-SCNC: 3.9 MMOL/L (ref 3.5–5.1)
RBC # BLD AUTO: 5.4 M/UL (ref 4.6–6.2)
SODIUM SERPL-SCNC: 135 MMOL/L (ref 136–145)
WBC # BLD AUTO: 2.88 K/UL (ref 3.9–12.7)

## 2020-01-31 PROCEDURE — 85025 COMPLETE CBC W/AUTO DIFF WBC: CPT

## 2020-01-31 PROCEDURE — 25000003 PHARM REV CODE 250: Performed by: INTERNAL MEDICINE

## 2020-01-31 PROCEDURE — 63600175 PHARM REV CODE 636 W HCPCS: Performed by: INTERNAL MEDICINE

## 2020-01-31 PROCEDURE — G0378 HOSPITAL OBSERVATION PER HR: HCPCS

## 2020-01-31 PROCEDURE — 80048 BASIC METABOLIC PNL TOTAL CA: CPT

## 2020-01-31 PROCEDURE — 36415 COLL VENOUS BLD VENIPUNCTURE: CPT

## 2020-01-31 RX ORDER — METOPROLOL SUCCINATE 25 MG/1
25 TABLET, EXTENDED RELEASE ORAL DAILY
Qty: 30 TABLET | Refills: 11 | Status: CANCELLED | OUTPATIENT
Start: 2020-02-01 | End: 2021-01-31

## 2020-01-31 RX ORDER — LEVOTHYROXINE SODIUM 100 UG/1
100 TABLET ORAL DAILY
Qty: 30 TABLET | Refills: 11 | Status: CANCELLED | OUTPATIENT
Start: 2020-02-01 | End: 2021-01-31

## 2020-01-31 RX ORDER — FINASTERIDE 5 MG/1
5 TABLET, FILM COATED ORAL DAILY
Qty: 30 TABLET | Refills: 11 | Status: CANCELLED | OUTPATIENT
Start: 2020-01-31 | End: 2021-01-30

## 2020-01-31 RX ORDER — PANTOPRAZOLE SODIUM 40 MG/1
40 TABLET, DELAYED RELEASE ORAL DAILY
Qty: 30 TABLET | Refills: 11 | Status: CANCELLED | OUTPATIENT
Start: 2020-01-31 | End: 2021-01-30

## 2020-01-31 RX ADMIN — LOSARTAN POTASSIUM 25 MG: 25 TABLET, FILM COATED ORAL at 08:01

## 2020-01-31 RX ADMIN — ASPIRIN 81 MG: 81 TABLET, DELAYED RELEASE ORAL at 08:01

## 2020-01-31 RX ADMIN — FOLIC ACID 1 MG: 1 TABLET ORAL at 08:01

## 2020-01-31 RX ADMIN — METOPROLOL SUCCINATE 25 MG: 25 TABLET, FILM COATED, EXTENDED RELEASE ORAL at 08:01

## 2020-01-31 RX ADMIN — THIAMINE HCL TAB 100 MG 100 MG: 100 TAB at 08:01

## 2020-01-31 RX ADMIN — CEFAZOLIN SODIUM 1 G: 1 SOLUTION INTRAVENOUS at 01:01

## 2020-01-31 RX ADMIN — CYANOCOBALAMIN TAB 1000 MCG 1000 MCG: 1000 TAB at 08:01

## 2020-01-31 RX ADMIN — LEVOTHYROXINE SODIUM 100 MCG: 100 TABLET ORAL at 08:01

## 2020-01-31 NOTE — PLAN OF CARE
01/31/20 0926   MTZ Message   Medicare Outpatient and Observation Notification regarding financial responsibility Given to patient/caregiver;Explained to patient/caregiver;Signed/date by patient/caregiver   Date MTZ was signed 01/31/20   Time MTZ was signed 0910

## 2020-01-31 NOTE — HOSPITAL COURSE
S/p ICD placement complicated by small pneumothorax. Today's CXR shows near complete resolution with good placement of leads. Short run of Atrial fibrillation rate 130-140s, self-converted to NSR.

## 2020-01-31 NOTE — NURSING
Discharge instructions given to patient. Questions answered. Patient to be brought to private vehicle by wheelchair.

## 2020-01-31 NOTE — PROGRESS NOTES
[facility]  Cardiology  Progress Note    Patient Name: Trevor Madrid  MRN: [id number]  Admission Date: 1/29/2020  Hospital Length of Stay: 0 days  Code Status: Full Code   Attending Physician: Gerald Romero, *   Primary Care Physician: Primary Doctor No  Expected Discharge Date:   Principal Problem:<principal problem not specified>    Subjective:       Interval History: Denies any significant pain or shortness of breath.     ROS   Denies any cough.  Denies any abdominal pain.   Denies any dysuria.   Objective:     Vital Signs (Most Recent):  Temp: 98.3 °F (36.8 °C) (01/30/20 1555)  Pulse: 87 (01/30/20 1555)  Resp: (!) 25 (01/30/20 1205)  BP: 122/73 (01/30/20 1555)  SpO2: 98 % (01/30/20 1555) Vital Signs (24h Range):  Temp:  [98.2 °F (36.8 °C)-98.4 °F (36.9 °C)] 98.3 °F (36.8 °C)  Pulse:  [] 87  Resp:  [16-25] 25  SpO2:  [95 %-98 %] 98 %  BP: (122-154)/(73-92) 122/73     Weight: 69 kg (152 lb 1.9 oz)  Body mass index is 23.82 kg/m².    SpO2: 98 %  O2 Device (Oxygen Therapy): room air      Intake/Output Summary (Last 24 hours) at 1/30/2020 1852  Last data filed at 1/30/2020 1800  Gross per 24 hour   Intake 560 ml   Output 1170 ml   Net -610 ml       Lines/Drains/Airways     Peripheral Intravenous Line                 Peripheral IV - Single Lumen 09/16/19 0615 20 G Left Forearm 136 days         Peripheral IV - Single Lumen 01/29/20 0617 20 G Left Forearm 1 day                Scheduled Meds:   [START ON 1/31/2020] aspirin  81 mg Oral Daily    ceFAZolin (ANCEF) IVPB  1 g Intravenous Q8H    [START ON 1/31/2020] cyanocobalamin  1,000 mcg Oral Daily    [START ON 1/31/2020] finasteride  5 mg Oral Daily    [START ON 1/31/2020] folic acid  1 mg Oral Daily    [START ON 1/31/2020] levothyroxine  100 mcg Oral Daily    [START ON 1/31/2020] losartan  25 mg Oral Daily    [START ON 1/31/2020] metoprolol succinate  25 mg Oral Daily    [START ON 1/31/2020] pantoprazole  40 mg Oral Daily    [START ON  1/31/2020] thiamine  100 mg Oral Daily     Continuous Infusions:  PRN Meds:.acetaminophen, HYDROcodone-acetaminophen     Physical Exam  HEENT: Normocephalic, atraumatic, PERRL, Conjunctiva pink, no scleral icterus.   CVS: S1S2+, RRR, no murmurs, rubs or gallops, JVP: Normal.  LUNGS: Clear  ABDOMEN: Soft, NT, BS+  EXTREMITIES: No cyanosis, clubbing or edema  NEURO: AAO X 3.   SITE of implant: No significant hematoma or bleeding.     Significant Labs: BMP: No results for input(s): GLU, NA, K, CL, CO2, BUN, CREATININE, CALCIUM, MG in the last 48 hours., CMP No results for input(s): NA, K, CL, CO2, GLU, BUN, CREATININE, CALCIUM, PROT, ALBUMIN, BILITOT, ALKPHOS, AST, ALT, ANIONGAP, ESTGFRAFRICA, EGFRNONAA in the last 48 hours., CBC No results for input(s): WBC, HGB, HCT, PLT in the last 48 hours., INR No results for input(s): INR, PROTIME in the last 48 hours., Lipid Panel No results for input(s): CHOL, HDL, LDLCALC, TRIG, CHOLHDL in the last 48 hours. and Troponin No results for input(s): TROPONINI in the last 48 hours.    Significant Imaging: Reviewed.   Assessment and Plan:     IMPRESSION:  Pneumothorax. Small  S/p ICD implant. Dual chamber. Medtronic device.   Cardiomyopathy.   Atrial fibrillation. Paroxysmal.   HILDA.  CAD.     PLAN:  1. Change to inpatient in view of the pneumothorax.   2. Continue current management.   3. Repeat CXR later today.         Gerald Romero MD  Cardiology  [facility]

## 2020-01-31 NOTE — DISCHARGE INSTRUCTIONS
Advised patient to hold Eliquis until he returns to clinic next week  Resume Plavix tomorrow  Post ICD instructions provided as well as prescriptions for Norco and Keflex

## 2020-01-31 NOTE — HPI
Mr Madrid is a 69 year old male patient with ischemic cardiomyopathy who presented this hospitalization for placement on AICD. Patient tolerated the procedure well. He did develop a small left pneumothorax without complications. Today's CXR shows near complete resolution of the pneumothorax. Earlier this morning, he went into atrial fibrillation with RVR rate 140s and had a 7 beat run of ventricular tachycardia. Rhythm persisted for about 30 minutes and resolved on it's own. He does have history of atrial fibrillation. Presently he is asymptomatic and stable for discharge. Left CW site is clean and dry with dressing intact, mild bruising. Pain is controlled. He should follow up in the clinic with us on February 5th, 2020.

## 2020-01-31 NOTE — DISCHARGE SUMMARY
[facility]  Interventional Cardiology  Discharge Summary      Patient Name: Trevor Madrid  MRN: [id number]  Admission Date: 1/29/2020  Hospital Length of Stay: 0 days  Discharge Date and Time:  01/31/2020 10:05 AM  Attending Physician: Gerald Romero, *  Discharging Provider: Leticia Barkley NP  Primary Care Physician: Primary Doctor No        Procedure(s) (LRB):  INSERTION, ICD, DUAL CHAMBER (MEDTRONIC) (Left)     Indwelling Lines/Drains at time of discharge:  Lines/Drains/Airways     None                 Hospital Course:  Mr Madrid is a 69 year old male patient with ischemic cardiomyopathy who presented this hospitalization for placement on AICD. Patient tolerated the procedure well. He did develop a small left pneumothorax and he was admitted for observation. Today's CXR shows improvement in the small pneumothorax. He does have history of atrial fibrillation. While in hospital he transiently went into atrial fibrillation with RVR. Presently he is asymptomatic and stable for discharge. Site of ICD implant is clean and dry with dressing intact, mild bruising. Pain is controlled. He should follow up in the clinic with us on February 5th, 2020.       Significant Diagnostic Studies: Labs:   BMP:   Recent Labs   Lab 01/31/20 0433   GLU 93   *   K 3.9      CO2 23   BUN 15   CREATININE 0.8   CALCIUM 8.4*   , CMP   Recent Labs   Lab 01/31/20 0433   *   K 3.9      CO2 23   GLU 93   BUN 15   CREATININE 0.8   CALCIUM 8.4*   ANIONGAP 10   ESTGFRAFRICA >60.0   EGFRNONAA >60.0   , CBC   Recent Labs   Lab 01/31/20 0433   WBC 2.88*   HGB 12.6*   HCT 41.1      , INR   Lab Results   Component Value Date    INR 1.1 01/27/2020    INR 1.2 09/12/2019   , Lipid Panel No results found for: CHOL, HDL, LDLCALC, TRIG, CHOLHDL, Troponin No results for input(s): TROPONINI in the last 168 hours., A1C: No results for input(s): HGBA1C in the last 4320 hours. and All labs within the past 24 hours  have been reviewed  I have reviewed the imaging all significant imaging for the last 24 hours.      Pending Diagnostic Studies:     None        No new Assessment & Plan notes have been filed under this hospital service since the last note was generated.  Service: Interventional Cardiology      Discharged Condition: stable    Follow Up:  Follow-up Information     Gerald Romero MD In 1 week.    Specialties:  Cardiovascular Disease, Cardiology  Why:  For wound re-check  Contact information:  [address] [place] [address]  [address]  [facility] [address]  [phone]                 Patient Instructions:   No discharge procedures on file.  Medications:  Reconciled Home Medications:      Medication List      CONTINUE taking these medications    aspirin 81 MG EC tablet  Commonly known as:  ECOTRIN  Take 81 mg by mouth once daily.     clopidogreL 75 mg tablet  Commonly known as:  PLAVIX  Take 75 mg by mouth once daily.     cyanocobalamin 1000 MCG tablet  Commonly known as:  VITAMIN B-12  Take 1,000 mcg by mouth once daily.     finasteride 5 mg tablet  Commonly known as:  PROSCAR  Take 5 mg by mouth once daily.     folic acid 1 MG tablet  Commonly known as:  FOLVITE  Take 1 mg by mouth once daily.     furosemide 20 MG tablet  Commonly known as:  LASIX  Take 1 tablet (20 mg total) by mouth once daily.     leflunomide 20 MG Tab  Commonly known as:  ARAVA  Take 20 mg by mouth once daily.     levothyroxine 100 MCG tablet  Commonly known as:  SYNTHROID  Take 100 mcg by mouth once daily.     loratadine 10 mg tablet  Commonly known as:  CLARITIN  Take 10 mg by mouth once daily.     losartan 25 MG tablet  Commonly known as:  COZAAR  Take 25 mg by mouth once daily.     methotrexate 2.5 MG Tab  Take by mouth every 7 days.     metoprolol succinate 50 MG 24 hr tablet  Commonly known as:  TOPROL-XL  Take 25 mg by mouth once daily.     pantoprazole 40 MG tablet  Commonly known as:  PROTONIX  Take 1 tablet (40 mg total) by mouth once daily.      prednisoLONE acetate 1 % Drps  Commonly known as:  PRED FORTE  1 drop 4 (four) times daily as needed.     spironolactone 25 MG tablet  Commonly known as:  ALDACTONE  Take 25 mg by mouth daily as needed.     thiamine 100 MG tablet  Take 100 mg by mouth once daily.        STOP taking these medications    Eliquis 5 mg Tab  Generic drug:  apixaban     potassium chloride 10 MEQ Tbsr  Commonly known as:  KLOR-CON            Time spent on the discharge of patient: 30 minutes    Leticia Barkley NP  Interventional Cardiology  [facility]    I have personally seen and examined the patient. I reviewed the notes, assessments, and/or procedures performed by Ms Leticia Barkley, I concur with her documentation of Trevor Madrid.

## 2020-02-12 ENCOUNTER — HOSPITAL ENCOUNTER (OUTPATIENT)
Dept: RADIOLOGY | Facility: HOSPITAL | Age: 70
Discharge: HOME | End: 2020-02-12
Attending: INTERNAL MEDICINE
Payer: MEDICARE

## 2020-02-12 DIAGNOSIS — I48.0 PAROXYSMAL ATRIAL FIBRILLATION: Primary | ICD-10-CM

## 2020-02-12 DIAGNOSIS — I48.0 PAROXYSMAL ATRIAL FIBRILLATION: ICD-10-CM

## 2020-02-12 PROCEDURE — 71046 X-RAY EXAM CHEST 2 VIEWS: CPT | Mod: TC

## 2020-05-24 ENCOUNTER — HOSPITAL ENCOUNTER (INPATIENT)
Facility: HOSPITAL | Age: 70
LOS: 2 days | Discharge: HOME | DRG: 291 | End: 2020-05-26
Attending: EMERGENCY MEDICINE | Admitting: INTERNAL MEDICINE
Payer: MEDICARE

## 2020-05-24 DIAGNOSIS — Z20.822 SUSPECTED COVID-19 VIRUS INFECTION: ICD-10-CM

## 2020-05-24 DIAGNOSIS — J96.01 ACUTE HYPOXEMIC RESPIRATORY FAILURE: ICD-10-CM

## 2020-05-24 DIAGNOSIS — I50.9 CONGESTIVE HEART FAILURE, UNSPECIFIED HF CHRONICITY, UNSPECIFIED HEART FAILURE TYPE: Primary | ICD-10-CM

## 2020-05-24 DIAGNOSIS — R07.9 CHEST PAIN: ICD-10-CM

## 2020-05-24 DIAGNOSIS — I50.9 HEART FAILURE: ICD-10-CM

## 2020-05-24 PROBLEM — D72.819 CHRONIC LEUKOPENIA: Status: ACTIVE | Noted: 2020-05-24

## 2020-05-24 PROBLEM — D86.9 SARCOIDOSIS: Status: ACTIVE | Noted: 2020-05-24

## 2020-05-24 PROBLEM — E87.6 HYPOKALEMIA: Status: ACTIVE | Noted: 2020-05-24

## 2020-05-24 PROBLEM — I50.43 ACUTE ON CHRONIC COMBINED SYSTOLIC AND DIASTOLIC HEART FAILURE: Status: ACTIVE | Noted: 2020-05-24

## 2020-05-24 LAB
ALBUMIN SERPL BCP-MCNC: 2.9 G/DL (ref 3.5–5.2)
ALLENS TEST: ABNORMAL
ALP SERPL-CCNC: 55 U/L (ref 55–135)
ALT SERPL W/O P-5'-P-CCNC: 17 U/L (ref 10–44)
ANION GAP SERPL CALC-SCNC: 10 MMOL/L (ref 8–16)
AST SERPL-CCNC: 22 U/L (ref 10–40)
BASOPHILS # BLD AUTO: 0.05 K/UL (ref 0–0.2)
BASOPHILS NFR BLD: 1.3 % (ref 0–1.9)
BILIRUB SERPL-MCNC: 0.8 MG/DL (ref 0.1–1)
BNP SERPL-MCNC: 1108 PG/ML (ref 0–99)
BUN SERPL-MCNC: 11 MG/DL (ref 8–23)
CALCIUM SERPL-MCNC: 8.1 MG/DL (ref 8.7–10.5)
CHLORIDE SERPL-SCNC: 100 MMOL/L (ref 95–110)
CK SERPL-CCNC: 222 U/L (ref 20–200)
CO2 SERPL-SCNC: 25 MMOL/L (ref 23–29)
CREAT SERPL-MCNC: 0.8 MG/DL (ref 0.5–1.4)
CRP SERPL-MCNC: 7.01 MG/DL
DELSYS: ABNORMAL
DIFFERENTIAL METHOD: ABNORMAL
EOSINOPHIL # BLD AUTO: 0 K/UL (ref 0–0.5)
EOSINOPHIL NFR BLD: 1.1 % (ref 0–8)
EP: 6
ERYTHROCYTE [DISTWIDTH] IN BLOOD BY AUTOMATED COUNT: 14.2 % (ref 11.5–14.5)
EST. GFR  (AFRICAN AMERICAN): >60 ML/MIN/1.73 M^2
EST. GFR  (NON AFRICAN AMERICAN): >60 ML/MIN/1.73 M^2
FERRITIN SERPL-MCNC: 1597 NG/ML (ref 20–300)
FIO2: 30
GLUCOSE SERPL-MCNC: 119 MG/DL (ref 70–110)
HCO3 UR-SCNC: 24.9 MMOL/L (ref 24–28)
HCT VFR BLD AUTO: 36.5 % (ref 40–54)
HGB BLD-MCNC: 11 G/DL (ref 14–18)
IMM GRANULOCYTES # BLD AUTO: 0.01 K/UL (ref 0–0.04)
IMM GRANULOCYTES NFR BLD AUTO: 0.3 % (ref 0–0.5)
IP: 12
LACTATE SERPL-SCNC: 1.4 MMOL/L (ref 0.5–1.9)
LDH SERPL L TO P-CCNC: 360 U/L (ref 110–260)
LYMPHOCYTES # BLD AUTO: 0.4 K/UL (ref 1–4.8)
LYMPHOCYTES NFR BLD: 9.9 % (ref 18–48)
MCH RBC QN AUTO: 22.5 PG (ref 27–31)
MCHC RBC AUTO-ENTMCNC: 30.1 G/DL (ref 32–36)
MCV RBC AUTO: 75 FL (ref 82–98)
MIN VOL: 19
MODE: ABNORMAL
MONOCYTES # BLD AUTO: 0.4 K/UL (ref 0.3–1)
MONOCYTES NFR BLD: 11.8 % (ref 4–15)
NEUTROPHILS # BLD AUTO: 2.8 K/UL (ref 1.8–7.7)
NEUTROPHILS NFR BLD: 75.6 % (ref 38–73)
NRBC BLD-RTO: 0 /100 WBC
PCO2 BLDA: 38.1 MMHG (ref 35–45)
PH SMN: 7.42 [PH] (ref 7.35–7.45)
PLATELET # BLD AUTO: 249 K/UL (ref 150–350)
PMV BLD AUTO: 11.4 FL (ref 9.2–12.9)
PO2 BLDA: 78 MMHG (ref 80–100)
POC BE: 0 MMOL/L
POC SATURATED O2: 96 % (ref 95–100)
POC TCO2: 26 MMOL/L (ref 23–27)
POTASSIUM SERPL-SCNC: 3.4 MMOL/L (ref 3.5–5.1)
PROT SERPL-MCNC: 7.1 G/DL (ref 6–8.4)
RBC # BLD AUTO: 4.88 M/UL (ref 4.6–6.2)
SAMPLE: ABNORMAL
SARS-COV-2 RDRP RESP QL NAA+PROBE: NEGATIVE
SITE: ABNORMAL
SODIUM SERPL-SCNC: 135 MMOL/L (ref 136–145)
SP02: 95
SPONT RATE: 43
TROPONIN I SERPL DL<=0.01 NG/ML-MCNC: 0.05 NG/ML
WBC # BLD AUTO: 3.72 K/UL (ref 3.9–12.7)

## 2020-05-24 PROCEDURE — 83615 LACTATE (LD) (LDH) ENZYME: CPT

## 2020-05-24 PROCEDURE — 25000242 PHARM REV CODE 250 ALT 637 W/ HCPCS: Performed by: EMERGENCY MEDICINE

## 2020-05-24 PROCEDURE — 27000221 HC OXYGEN, UP TO 24 HOURS

## 2020-05-24 PROCEDURE — 21400001 HC TELEMETRY ROOM

## 2020-05-24 PROCEDURE — 84484 ASSAY OF TROPONIN QUANT: CPT

## 2020-05-24 PROCEDURE — 20000000 HC ICU ROOM

## 2020-05-24 PROCEDURE — 86140 C-REACTIVE PROTEIN: CPT

## 2020-05-24 PROCEDURE — 83605 ASSAY OF LACTIC ACID: CPT

## 2020-05-24 PROCEDURE — 99900035 HC TECH TIME PER 15 MIN (STAT)

## 2020-05-24 PROCEDURE — 63600175 PHARM REV CODE 636 W HCPCS: Performed by: NURSE PRACTITIONER

## 2020-05-24 PROCEDURE — 94640 AIRWAY INHALATION TREATMENT: CPT

## 2020-05-24 PROCEDURE — 83880 ASSAY OF NATRIURETIC PEPTIDE: CPT

## 2020-05-24 PROCEDURE — 87040 BLOOD CULTURE FOR BACTERIA: CPT | Mod: 59

## 2020-05-24 PROCEDURE — 85025 COMPLETE CBC W/AUTO DIFF WBC: CPT

## 2020-05-24 PROCEDURE — 84145 PROCALCITONIN (PCT): CPT

## 2020-05-24 PROCEDURE — 94761 N-INVAS EAR/PLS OXIMETRY MLT: CPT

## 2020-05-24 PROCEDURE — 93005 ELECTROCARDIOGRAM TRACING: CPT | Performed by: INTERNAL MEDICINE

## 2020-05-24 PROCEDURE — 82550 ASSAY OF CK (CPK): CPT

## 2020-05-24 PROCEDURE — 99291 CRITICAL CARE FIRST HOUR: CPT

## 2020-05-24 PROCEDURE — 82728 ASSAY OF FERRITIN: CPT

## 2020-05-24 PROCEDURE — 82803 BLOOD GASES ANY COMBINATION: CPT

## 2020-05-24 PROCEDURE — U0002 COVID-19 LAB TEST NON-CDC: HCPCS

## 2020-05-24 PROCEDURE — 36415 COLL VENOUS BLD VENIPUNCTURE: CPT

## 2020-05-24 PROCEDURE — 36600 WITHDRAWAL OF ARTERIAL BLOOD: CPT

## 2020-05-24 PROCEDURE — 80053 COMPREHEN METABOLIC PANEL: CPT

## 2020-05-24 PROCEDURE — 83735 ASSAY OF MAGNESIUM: CPT

## 2020-05-24 RX ORDER — ALBUTEROL SULFATE 90 UG/1
2 AEROSOL, METERED RESPIRATORY (INHALATION) EVERY 8 HOURS
Status: DISCONTINUED | OUTPATIENT
Start: 2020-05-25 | End: 2020-05-24

## 2020-05-24 RX ORDER — AMOXICILLIN 250 MG
1 CAPSULE ORAL 2 TIMES DAILY
Status: DISCONTINUED | OUTPATIENT
Start: 2020-05-25 | End: 2020-05-26 | Stop reason: HOSPADM

## 2020-05-24 RX ORDER — NAPROXEN SODIUM 220 MG/1
81 TABLET, FILM COATED ORAL DAILY
Status: DISCONTINUED | OUTPATIENT
Start: 2020-05-25 | End: 2020-05-26 | Stop reason: HOSPADM

## 2020-05-24 RX ORDER — SODIUM CHLORIDE 0.9 % (FLUSH) 0.9 %
10 SYRINGE (ML) INJECTION
Status: DISCONTINUED | OUTPATIENT
Start: 2020-05-25 | End: 2020-05-26 | Stop reason: HOSPADM

## 2020-05-24 RX ORDER — FUROSEMIDE 10 MG/ML
40 INJECTION INTRAMUSCULAR; INTRAVENOUS 2 TIMES DAILY
Status: DISCONTINUED | OUTPATIENT
Start: 2020-05-25 | End: 2020-05-26 | Stop reason: HOSPADM

## 2020-05-24 RX ORDER — IPRATROPIUM BROMIDE 0.5 MG/2.5ML
0.5 SOLUTION RESPIRATORY (INHALATION) EVERY 6 HOURS
Status: DISCONTINUED | OUTPATIENT
Start: 2020-05-25 | End: 2020-05-25

## 2020-05-24 RX ORDER — LEVOFLOXACIN 5 MG/ML
750 INJECTION, SOLUTION INTRAVENOUS
Status: DISCONTINUED | OUTPATIENT
Start: 2020-05-25 | End: 2020-05-25

## 2020-05-24 RX ORDER — ONDANSETRON 2 MG/ML
4 INJECTION INTRAMUSCULAR; INTRAVENOUS EVERY 8 HOURS PRN
Status: DISCONTINUED | OUTPATIENT
Start: 2020-05-25 | End: 2020-05-26 | Stop reason: HOSPADM

## 2020-05-24 RX ORDER — ALBUTEROL SULFATE 90 UG/1
2 AEROSOL, METERED RESPIRATORY (INHALATION) EVERY 8 HOURS
Status: DISCONTINUED | OUTPATIENT
Start: 2020-05-25 | End: 2020-05-25

## 2020-05-24 RX ORDER — LEVALBUTEROL 1.25 MG/.5ML
1.25 SOLUTION, CONCENTRATE RESPIRATORY (INHALATION) EVERY 6 HOURS
Status: DISCONTINUED | OUTPATIENT
Start: 2020-05-25 | End: 2020-05-25

## 2020-05-24 RX ORDER — ACETAMINOPHEN 325 MG/1
650 TABLET ORAL EVERY 4 HOURS PRN
Status: DISCONTINUED | OUTPATIENT
Start: 2020-05-25 | End: 2020-05-26 | Stop reason: HOSPADM

## 2020-05-24 RX ORDER — FUROSEMIDE 10 MG/ML
40 INJECTION INTRAMUSCULAR; INTRAVENOUS
Status: COMPLETED | OUTPATIENT
Start: 2020-05-24 | End: 2020-05-24

## 2020-05-24 RX ORDER — TALC
6 POWDER (GRAM) TOPICAL NIGHTLY PRN
Status: DISCONTINUED | OUTPATIENT
Start: 2020-05-25 | End: 2020-05-26 | Stop reason: HOSPADM

## 2020-05-24 RX ADMIN — ALBUTEROL SULFATE 2 PUFF: 90 AEROSOL, METERED RESPIRATORY (INHALATION) at 09:05

## 2020-05-24 RX ADMIN — FUROSEMIDE 40 MG: 10 INJECTION, SOLUTION INTRAMUSCULAR; INTRAVENOUS at 11:05

## 2020-05-25 PROBLEM — J96.01 ACUTE RESPIRATORY FAILURE WITH HYPOXIA: Status: ACTIVE | Noted: 2020-05-25

## 2020-05-25 PROBLEM — E87.6 HYPOKALEMIA: Status: RESOLVED | Noted: 2020-05-24 | Resolved: 2020-05-25

## 2020-05-25 PROBLEM — R79.89 ELEVATED TROPONIN: Status: ACTIVE | Noted: 2020-05-25

## 2020-05-25 PROBLEM — I50.23 ACUTE ON CHRONIC SYSTOLIC HEART FAILURE: Status: ACTIVE | Noted: 2020-05-24

## 2020-05-25 PROBLEM — D50.9 MICROCYTIC ANEMIA: Status: ACTIVE | Noted: 2020-05-25

## 2020-05-25 PROBLEM — I48.0 PAROXYSMAL ATRIAL FIBRILLATION: Status: ACTIVE | Noted: 2020-05-25

## 2020-05-25 LAB
ANION GAP SERPL CALC-SCNC: 9 MMOL/L (ref 8–16)
BASOPHILS # BLD AUTO: 0.04 K/UL (ref 0–0.2)
BASOPHILS NFR BLD: 1 % (ref 0–1.9)
BUN SERPL-MCNC: 10 MG/DL (ref 8–23)
CALCIUM SERPL-MCNC: 7.8 MG/DL (ref 8.7–10.5)
CHLORIDE SERPL-SCNC: 97 MMOL/L (ref 95–110)
CO2 SERPL-SCNC: 27 MMOL/L (ref 23–29)
CREAT SERPL-MCNC: 0.8 MG/DL (ref 0.5–1.4)
DIFFERENTIAL METHOD: ABNORMAL
EOSINOPHIL # BLD AUTO: 0 K/UL (ref 0–0.5)
EOSINOPHIL NFR BLD: 0.5 % (ref 0–8)
ERYTHROCYTE [DISTWIDTH] IN BLOOD BY AUTOMATED COUNT: 14.1 % (ref 11.5–14.5)
EST. GFR  (AFRICAN AMERICAN): >60 ML/MIN/1.73 M^2
EST. GFR  (NON AFRICAN AMERICAN): >60 ML/MIN/1.73 M^2
GLUCOSE SERPL-MCNC: 117 MG/DL (ref 70–110)
HCT VFR BLD AUTO: 37.2 % (ref 40–54)
HGB BLD-MCNC: 11 G/DL (ref 14–18)
IMM GRANULOCYTES # BLD AUTO: 0.01 K/UL (ref 0–0.04)
IMM GRANULOCYTES NFR BLD AUTO: 0.3 % (ref 0–0.5)
IRON SERPL-MCNC: 35 UG/DL (ref 45–160)
LYMPHOCYTES # BLD AUTO: 0.3 K/UL (ref 1–4.8)
LYMPHOCYTES NFR BLD: 7.2 % (ref 18–48)
MAGNESIUM SERPL-MCNC: 1.8 MG/DL (ref 1.6–2.6)
MAGNESIUM SERPL-MCNC: 1.9 MG/DL (ref 1.6–2.6)
MCH RBC QN AUTO: 22.2 PG (ref 27–31)
MCHC RBC AUTO-ENTMCNC: 29.6 G/DL (ref 32–36)
MCV RBC AUTO: 75 FL (ref 82–98)
MONOCYTES # BLD AUTO: 0.4 K/UL (ref 0.3–1)
MONOCYTES NFR BLD: 9 % (ref 4–15)
NEUTROPHILS # BLD AUTO: 3.2 K/UL (ref 1.8–7.7)
NEUTROPHILS NFR BLD: 82 % (ref 38–73)
NRBC BLD-RTO: 0 /100 WBC
PLATELET # BLD AUTO: 249 K/UL (ref 150–350)
PMV BLD AUTO: 11.7 FL (ref 9.2–12.9)
POTASSIUM SERPL-SCNC: 3.5 MMOL/L (ref 3.5–5.1)
PROCALCITONIN SERPL IA-MCNC: 0.12 NG/ML (ref 0–0.5)
RBC # BLD AUTO: 4.95 M/UL (ref 4.6–6.2)
SATURATED IRON: 18 % (ref 20–50)
SODIUM SERPL-SCNC: 133 MMOL/L (ref 136–145)
TOTAL IRON BINDING CAPACITY: 192 UG/DL (ref 250–450)
TRANSFERRIN SERPL-MCNC: 137 MG/DL (ref 200–375)
TROPONIN I SERPL DL<=0.01 NG/ML-MCNC: 0.06 NG/ML
TROPONIN I SERPL DL<=0.01 NG/ML-MCNC: 0.06 NG/ML
WBC # BLD AUTO: 3.9 K/UL (ref 3.9–12.7)

## 2020-05-25 PROCEDURE — 25000003 PHARM REV CODE 250: Performed by: EMERGENCY MEDICINE

## 2020-05-25 PROCEDURE — 94660 CPAP INITIATION&MGMT: CPT

## 2020-05-25 PROCEDURE — 93005 ELECTROCARDIOGRAM TRACING: CPT | Performed by: INTERNAL MEDICINE

## 2020-05-25 PROCEDURE — 99900035 HC TECH TIME PER 15 MIN (STAT)

## 2020-05-25 PROCEDURE — 25000003 PHARM REV CODE 250: Performed by: NURSE PRACTITIONER

## 2020-05-25 PROCEDURE — 94640 AIRWAY INHALATION TREATMENT: CPT

## 2020-05-25 PROCEDURE — 80048 BASIC METABOLIC PNL TOTAL CA: CPT

## 2020-05-25 PROCEDURE — 25000242 PHARM REV CODE 250 ALT 637 W/ HCPCS: Performed by: NURSE PRACTITIONER

## 2020-05-25 PROCEDURE — 85025 COMPLETE CBC W/AUTO DIFF WBC: CPT

## 2020-05-25 PROCEDURE — 83540 ASSAY OF IRON: CPT

## 2020-05-25 PROCEDURE — 36415 COLL VENOUS BLD VENIPUNCTURE: CPT

## 2020-05-25 PROCEDURE — 25000003 PHARM REV CODE 250: Performed by: INTERNAL MEDICINE

## 2020-05-25 PROCEDURE — 83735 ASSAY OF MAGNESIUM: CPT

## 2020-05-25 PROCEDURE — 63600175 PHARM REV CODE 636 W HCPCS: Performed by: NURSE PRACTITIONER

## 2020-05-25 PROCEDURE — 27000221 HC OXYGEN, UP TO 24 HOURS

## 2020-05-25 PROCEDURE — 84484 ASSAY OF TROPONIN QUANT: CPT | Mod: 91

## 2020-05-25 PROCEDURE — 21000000 HC CCU ICU ROOM CHARGE

## 2020-05-25 PROCEDURE — 21400001 HC TELEMETRY ROOM

## 2020-05-25 PROCEDURE — 94761 N-INVAS EAR/PLS OXIMETRY MLT: CPT

## 2020-05-25 RX ORDER — FOLIC ACID 1 MG/1
1 TABLET ORAL DAILY
COMMUNITY
End: 2021-03-22 | Stop reason: SDUPTHER

## 2020-05-25 RX ORDER — SPIRONOLACTONE 25 MG/1
25 TABLET ORAL DAILY
COMMUNITY
End: 2020-09-24 | Stop reason: SDUPTHER

## 2020-05-25 RX ORDER — LOSARTAN POTASSIUM 25 MG/1
25 TABLET ORAL DAILY
COMMUNITY
End: 2021-03-22 | Stop reason: SDUPTHER

## 2020-05-25 RX ORDER — FINASTERIDE 5 MG/1
5 TABLET, FILM COATED ORAL DAILY
COMMUNITY
End: 2021-03-22 | Stop reason: SDUPTHER

## 2020-05-25 RX ORDER — CETIRIZINE HYDROCHLORIDE 10 MG/1
10 TABLET ORAL DAILY
Status: DISCONTINUED | OUTPATIENT
Start: 2020-05-25 | End: 2020-05-26 | Stop reason: HOSPADM

## 2020-05-25 RX ORDER — POTASSIUM CHLORIDE 7.45 MG/ML
20 INJECTION INTRAVENOUS
Status: DISCONTINUED | OUTPATIENT
Start: 2020-05-25 | End: 2020-05-26 | Stop reason: HOSPADM

## 2020-05-25 RX ORDER — PREDNISOLONE ACETATE 10 MG/ML
1 SUSPENSION/ DROPS OPHTHALMIC 4 TIMES DAILY
Status: DISCONTINUED | OUTPATIENT
Start: 2020-05-25 | End: 2020-05-26 | Stop reason: HOSPADM

## 2020-05-25 RX ORDER — POTASSIUM CHLORIDE 20 MEQ/1
40 TABLET, EXTENDED RELEASE ORAL
Status: DISCONTINUED | OUTPATIENT
Start: 2020-05-25 | End: 2020-05-26 | Stop reason: HOSPADM

## 2020-05-25 RX ORDER — FINASTERIDE 5 MG/1
5 TABLET, FILM COATED ORAL DAILY
Status: DISCONTINUED | OUTPATIENT
Start: 2020-05-25 | End: 2020-05-26 | Stop reason: HOSPADM

## 2020-05-25 RX ORDER — IPRATROPIUM BROMIDE 0.5 MG/2.5ML
0.5 SOLUTION RESPIRATORY (INHALATION) EVERY 6 HOURS PRN
Status: DISCONTINUED | OUTPATIENT
Start: 2020-05-25 | End: 2020-05-25

## 2020-05-25 RX ORDER — THIAMINE HCL 100 MG
100 TABLET ORAL DAILY
Status: DISCONTINUED | OUTPATIENT
Start: 2020-05-25 | End: 2020-05-26 | Stop reason: HOSPADM

## 2020-05-25 RX ORDER — POTASSIUM CHLORIDE 20 MEQ/1
20 TABLET, EXTENDED RELEASE ORAL
Status: DISCONTINUED | OUTPATIENT
Start: 2020-05-25 | End: 2020-05-26 | Stop reason: HOSPADM

## 2020-05-25 RX ORDER — MAGNESIUM SULFATE HEPTAHYDRATE 40 MG/ML
4 INJECTION, SOLUTION INTRAVENOUS
Status: DISCONTINUED | OUTPATIENT
Start: 2020-05-25 | End: 2020-05-26 | Stop reason: HOSPADM

## 2020-05-25 RX ORDER — LORATADINE 10 MG/1
10 TABLET ORAL DAILY
COMMUNITY
End: 2021-03-22 | Stop reason: SDUPTHER

## 2020-05-25 RX ORDER — PANTOPRAZOLE SODIUM 40 MG/1
40 TABLET, DELAYED RELEASE ORAL DAILY
COMMUNITY

## 2020-05-25 RX ORDER — PREDNISOLONE ACETATE 10 MG/ML
1 SUSPENSION/ DROPS OPHTHALMIC 4 TIMES DAILY
COMMUNITY
End: 2021-03-22 | Stop reason: SDUPTHER

## 2020-05-25 RX ORDER — POTASSIUM CHLORIDE 7.45 MG/ML
40 INJECTION INTRAVENOUS
Status: DISCONTINUED | OUTPATIENT
Start: 2020-05-25 | End: 2020-05-26 | Stop reason: HOSPADM

## 2020-05-25 RX ORDER — LANOLIN ALCOHOL/MO/W.PET/CERES
800 CREAM (GRAM) TOPICAL
Status: DISCONTINUED | OUTPATIENT
Start: 2020-05-25 | End: 2020-05-26 | Stop reason: HOSPADM

## 2020-05-25 RX ORDER — IPRATROPIUM BROMIDE 0.5 MG/2.5ML
0.5 SOLUTION RESPIRATORY (INHALATION) EVERY 6 HOURS PRN
Status: DISCONTINUED | OUTPATIENT
Start: 2020-05-25 | End: 2020-05-26 | Stop reason: HOSPADM

## 2020-05-25 RX ORDER — CLOPIDOGREL BISULFATE 75 MG/1
75 TABLET ORAL DAILY
Status: DISCONTINUED | OUTPATIENT
Start: 2020-05-25 | End: 2020-05-26 | Stop reason: HOSPADM

## 2020-05-25 RX ORDER — FUROSEMIDE 20 MG/1
20 TABLET ORAL 2 TIMES DAILY
Status: ON HOLD | COMMUNITY
End: 2020-05-26 | Stop reason: SDUPTHER

## 2020-05-25 RX ORDER — METOPROLOL SUCCINATE 50 MG/1
50 TABLET, EXTENDED RELEASE ORAL DAILY
Status: DISCONTINUED | OUTPATIENT
Start: 2020-05-25 | End: 2020-05-26 | Stop reason: HOSPADM

## 2020-05-25 RX ORDER — POTASSIUM CHLORIDE 20 MEQ/1
40 TABLET, EXTENDED RELEASE ORAL ONCE
Status: COMPLETED | OUTPATIENT
Start: 2020-05-25 | End: 2020-05-25

## 2020-05-25 RX ORDER — LEFLUNOMIDE 20 MG/1
20 TABLET ORAL DAILY
COMMUNITY
End: 2021-03-22 | Stop reason: SDUPTHER

## 2020-05-25 RX ORDER — SPIRONOLACTONE 25 MG/1
25 TABLET ORAL DAILY
Status: DISCONTINUED | OUTPATIENT
Start: 2020-05-25 | End: 2020-05-26 | Stop reason: HOSPADM

## 2020-05-25 RX ORDER — METOPROLOL SUCCINATE 50 MG/1
50 TABLET, EXTENDED RELEASE ORAL DAILY
COMMUNITY
End: 2021-03-22 | Stop reason: SDUPTHER

## 2020-05-25 RX ORDER — MAGNESIUM SULFATE 1 G/100ML
1 INJECTION INTRAVENOUS
Status: DISCONTINUED | OUTPATIENT
Start: 2020-05-25 | End: 2020-05-26 | Stop reason: HOSPADM

## 2020-05-25 RX ORDER — PANTOPRAZOLE SODIUM 40 MG/1
40 TABLET, DELAYED RELEASE ORAL DAILY
Status: DISCONTINUED | OUTPATIENT
Start: 2020-05-25 | End: 2020-05-26 | Stop reason: HOSPADM

## 2020-05-25 RX ORDER — LEFLUNOMIDE 20 MG/1
20 TABLET ORAL DAILY
Status: DISCONTINUED | OUTPATIENT
Start: 2020-05-25 | End: 2020-05-26 | Stop reason: HOSPADM

## 2020-05-25 RX ORDER — LANOLIN ALCOHOL/MO/W.PET/CERES
100 CREAM (GRAM) TOPICAL DAILY
COMMUNITY
End: 2021-03-22 | Stop reason: SDUPTHER

## 2020-05-25 RX ORDER — MAGNESIUM SULFATE HEPTAHYDRATE 40 MG/ML
2 INJECTION, SOLUTION INTRAVENOUS
Status: DISCONTINUED | OUTPATIENT
Start: 2020-05-25 | End: 2020-05-26 | Stop reason: HOSPADM

## 2020-05-25 RX ORDER — FOLIC ACID 1 MG/1
1 TABLET ORAL DAILY
Status: DISCONTINUED | OUTPATIENT
Start: 2020-05-25 | End: 2020-05-26 | Stop reason: HOSPADM

## 2020-05-25 RX ORDER — ASPIRIN 81 MG/1
81 TABLET ORAL DAILY
COMMUNITY
End: 2021-03-22 | Stop reason: SDUPTHER

## 2020-05-25 RX ORDER — LEVOTHYROXINE SODIUM 100 UG/1
100 TABLET ORAL
COMMUNITY
End: 2021-03-22 | Stop reason: SDUPTHER

## 2020-05-25 RX ORDER — LOSARTAN POTASSIUM 25 MG/1
25 TABLET ORAL DAILY
Status: DISCONTINUED | OUTPATIENT
Start: 2020-05-25 | End: 2020-05-26 | Stop reason: HOSPADM

## 2020-05-25 RX ORDER — LEVOTHYROXINE SODIUM 100 UG/1
100 TABLET ORAL
Status: DISCONTINUED | OUTPATIENT
Start: 2020-05-25 | End: 2020-05-26 | Stop reason: HOSPADM

## 2020-05-25 RX ORDER — CLOPIDOGREL BISULFATE 75 MG/1
75 TABLET ORAL DAILY
COMMUNITY
End: 2021-03-22 | Stop reason: SDUPTHER

## 2020-05-25 RX ORDER — IPRATROPIUM BROMIDE AND ALBUTEROL SULFATE 2.5; .5 MG/3ML; MG/3ML
3 SOLUTION RESPIRATORY (INHALATION) EVERY 6 HOURS PRN
Status: DISCONTINUED | OUTPATIENT
Start: 2020-05-25 | End: 2020-05-25

## 2020-05-25 RX ADMIN — THIAMINE HCL TAB 100 MG 100 MG: 100 TAB at 10:05

## 2020-05-25 RX ADMIN — POTASSIUM CHLORIDE 40 MEQ: 20 TABLET, EXTENDED RELEASE ORAL at 05:05

## 2020-05-25 RX ADMIN — LEVOFLOXACIN 750 MG: 750 INJECTION, SOLUTION INTRAVENOUS at 01:05

## 2020-05-25 RX ADMIN — FOLIC ACID 1 MG: 1 TABLET ORAL at 10:05

## 2020-05-25 RX ADMIN — FUROSEMIDE 40 MG: 10 INJECTION, SOLUTION INTRAMUSCULAR; INTRAVENOUS at 06:05

## 2020-05-25 RX ADMIN — IPRATROPIUM BROMIDE 0.5 MG: 0.5 SOLUTION RESPIRATORY (INHALATION) at 07:05

## 2020-05-25 RX ADMIN — PANTOPRAZOLE SODIUM 40 MG: 40 TABLET, DELAYED RELEASE ORAL at 05:05

## 2020-05-25 RX ADMIN — METOPROLOL SUCCINATE 50 MG: 50 TABLET, FILM COATED, EXTENDED RELEASE ORAL at 10:05

## 2020-05-25 RX ADMIN — POTASSIUM CHLORIDE 40 MEQ: 20 TABLET, EXTENDED RELEASE ORAL at 12:05

## 2020-05-25 RX ADMIN — ASPIRIN 81 MG 81 MG: 81 TABLET ORAL at 10:05

## 2020-05-25 RX ADMIN — LOSARTAN POTASSIUM 25 MG: 25 TABLET, FILM COATED ORAL at 10:05

## 2020-05-25 RX ADMIN — IPRATROPIUM BROMIDE 0.5 MG: 0.5 SOLUTION RESPIRATORY (INHALATION) at 12:05

## 2020-05-25 RX ADMIN — SENNOSIDES AND DOCUSATE SODIUM 1 TABLET: 8.6; 5 TABLET ORAL at 10:05

## 2020-05-25 RX ADMIN — FINASTERIDE 5 MG: 5 TABLET, FILM COATED ORAL at 10:05

## 2020-05-25 RX ADMIN — CETIRIZINE HYDROCHLORIDE 10 MG: 10 TABLET, FILM COATED ORAL at 10:05

## 2020-05-25 RX ADMIN — MAGNESIUM OXIDE 800 MG: 400 TABLET ORAL at 05:05

## 2020-05-25 RX ADMIN — LEVALBUTEROL HYDROCHLORIDE 1.25 MG: 1.25 SOLUTION, CONCENTRATE RESPIRATORY (INHALATION) at 07:05

## 2020-05-25 RX ADMIN — FUROSEMIDE 40 MG: 10 INJECTION, SOLUTION INTRAMUSCULAR; INTRAVENOUS at 10:05

## 2020-05-25 RX ADMIN — LEVOTHYROXINE SODIUM 100 MCG: 100 TABLET ORAL at 05:05

## 2020-05-25 RX ADMIN — IPRATROPIUM BROMIDE 0.5 MG: 0.5 SOLUTION RESPIRATORY (INHALATION) at 01:05

## 2020-05-25 RX ADMIN — CLOPIDOGREL BISULFATE 75 MG: 75 TABLET, FILM COATED ORAL at 10:05

## 2020-05-25 NOTE — SUBJECTIVE & OBJECTIVE
Past Medical History:   Diagnosis Date    AICD (automatic cardioverter/defibrillator) present     Heart failure     Histoplasmosis     Sarcoidosis        Past Surgical History:   Procedure Laterality Date    CARDIAC DEFIBRILLATOR PLACEMENT Left 01/2020       Review of patient's allergies indicates:  No Known Allergies    No current facility-administered medications on file prior to encounter.      No current outpatient medications on file prior to encounter.     Family History     Problem Relation (Age of Onset)    Hypertension Father        Tobacco Use    Smoking status: Former Smoker   Substance and Sexual Activity    Alcohol use: Not Currently    Drug use: Never    Sexual activity: Not on file     Review of Systems   Constitutional: Positive for fatigue. Negative for chills, diaphoresis and fever.   HENT: Negative for congestion, ear pain, sore throat and trouble swallowing.    Eyes: Negative for pain, discharge and visual disturbance.   Respiratory: Positive for cough, shortness of breath and wheezing. Negative for chest tightness.    Cardiovascular: Negative for chest pain, palpitations and leg swelling.   Gastrointestinal: Negative for abdominal distention, abdominal pain, blood in stool, constipation, diarrhea, nausea and vomiting.   Endocrine: Negative for polydipsia, polyphagia and polyuria.   Genitourinary: Negative for dysuria, flank pain, frequency and urgency.   Musculoskeletal: Negative for back pain, joint swelling, neck pain and neck stiffness.   Skin: Negative for rash and wound.   Allergic/Immunologic: Negative for immunocompromised state.   Neurological: Positive for weakness. Negative for dizziness, syncope, speech difficulty, light-headedness, numbness and headaches.   Hematological: Negative for adenopathy.   Psychiatric/Behavioral: Negative for confusion and suicidal ideas. The patient is not nervous/anxious.    All other systems reviewed and are negative.    Objective:     Vital  Signs (Most Recent):  Temp: 99.1 °F (37.3 °C) (05/24/20 1930)  Pulse: 98 (05/24/20 2345)  Resp: (!) 26 (05/24/20 2345)  BP: (!) 145/92 (05/24/20 2330)  SpO2: 95 % (05/24/20 2345) Vital Signs (24h Range):  Temp:  [99.1 °F (37.3 °C)] 99.1 °F (37.3 °C)  Pulse:  [] 98  Resp:  [24-67] 26  SpO2:  [92 %-99 %] 95 %  BP: (132-145)/() 145/92     Weight: 68 kg (150 lb)  Body mass index is 22.81 kg/m².    Physical Exam   Constitutional: He is oriented to person, place, and time. He appears well-developed and well-nourished.   HENT:   Head: Normocephalic and atraumatic.   Eyes: Pupils are equal, round, and reactive to light. Conjunctivae and EOM are normal.   Neck: Normal range of motion. Neck supple.   Cardiovascular: Normal heart sounds and intact distal pulses.   Tachycardia, irregular rhythm   Pulmonary/Chest: He has wheezes. He has rales.   Tachypnea on bipap  Coarseness bilat left worse than right   Abdominal: Soft. Bowel sounds are normal.   Musculoskeletal: Normal range of motion.   Neurological: He is alert and oriented to person, place, and time.   Skin: Skin is warm and dry. Capillary refill takes less than 2 seconds.   Psychiatric: He has a normal mood and affect. His behavior is normal. Judgment and thought content normal.   Nursing note and vitals reviewed.        CRANIAL NERVES     CN III, IV, VI   Pupils are equal, round, and reactive to light.  Extraocular motions are normal.        Significant Labs:   CBC:   Recent Labs   Lab 05/24/20 2054   WBC 3.72*   HGB 11.0*   HCT 36.5*        CMP:   Recent Labs   Lab 05/24/20 2054   *   K 3.4*      CO2 25   *   BUN 11   CREATININE 0.8   CALCIUM 8.1*   PROT 7.1   ALBUMIN 2.9*   BILITOT 0.8   ALKPHOS 55   AST 22   ALT 17   ANIONGAP 10   EGFRNONAA >60.0     Cardiac Markers:   Recent Labs   Lab 05/24/20 2054   BNP 1,108*     Troponin:   Recent Labs   Lab 05/24/20 2054   TROPONINI 0.051*       Significant Imaging: CXR: I have reviewed  all pertinent results/findings within the past 24 hours and my personal findings are:  bilat interstitial opacities increased from prior with chronic scarring      EKG: Sinus tach with PVCs nonspecific ST and T wave abnormalities

## 2020-05-25 NOTE — PROGRESS NOTES
[facility] Medicine  Progress Note    Patient name: Trevor Madrid  MRN: [id number]  Admit Date: 5/24/2020   LOS: 1 day     SUBJECTIVE:     Principal problem: Acute on chronic systolic heart failure    Interval History:  Admitted for gradually worsening exertional dyspnea likely due to CHF exacerbation. Overnight he had significant diuresis;  net negative of 2L with one dose of 40 mg IV furosemide.  History of ischemic cardiomyopathy s/p AICD placement as well as paroxysmal atrial fibrillation.     Scheduled Meds:   aspirin  81 mg Oral Daily    cetirizine  10 mg Oral Daily    clopidogreL  75 mg Oral Daily    finasteride  5 mg Oral Daily    folic acid  1 mg Oral Daily    furosemide (LASIX) IV  40 mg Intravenous BID    ipratropium  0.5 mg Nebulization Q6H    leflunomide  20 mg Oral Daily    levothyroxine  100 mcg Oral Before breakfast    losartan  25 mg Oral Daily    metoprolol succinate  50 mg Oral Daily    pantoprazole  40 mg Oral Daily    prednisoLONE acetate  1 drop Both Eyes QID    senna-docusate 8.6-50 mg  1 tablet Oral BID    spironolactone  25 mg Oral Daily    thiamine  100 mg Oral Daily     Continuous Infusions:  PRN Meds:acetaminophen, albuterol-ipratropium, calcium chloride IVPB, calcium chloride IVPB, calcium chloride IVPB, magnesium oxide, magnesium sulfate IVPB, magnesium sulfate IVPB, magnesium sulfate IVPB, magnesium sulfate IVPB, melatonin, ondansetron, potassium chloride in water, potassium chloride in water, potassium chloride in water, potassium chloride in water, potassium chloride, potassium chloride, potassium chloride, potassium chloride, sodium chloride 0.9%, sodium phosphate IVPB, sodium phosphate IVPB, sodium phosphate IVPB, sodium phosphate IVPB, sodium phosphate IVPB    Review of patient's allergies indicates:  No Known Allergies    Review of Systems: As per interval history    OBJECTIVE:     Vital Signs (Most Recent)  Temp: 97.9 °F (36.6 °C) (05/25/20  0715)  Pulse: 103 (05/25/20 0800)  Resp: (!) 28 (05/25/20 0800)  BP: 113/63 (05/25/20 0800)  SpO2: (!) 93 % (05/25/20 0800)    Vital Signs Range (Last 24H):  Temp:  [96.5 °F (35.8 °C)-99.1 °F (37.3 °C)]   Pulse:  []   Resp:  [20-67]   BP: (113-145)/()   SpO2:  [92 %-100 %]     I & O (Last 24H):    Intake/Output Summary (Last 24 hours) at 5/25/2020 0852  Last data filed at 5/25/2020 0600  Gross per 24 hour   Intake 240 ml   Output 2150 ml   Net -1910 ml       Physical Exam:  General: Patient resting comfortably in no acute distress. Appears as stated age. Calm  Eyes: No conjunctival injection. No scleral icterus.  ENT: Hearing grossly intact. No discharge from ears. No nasal discharge.   Neck: Supple, trachea midline. No JVD  CVS: RRR. No LE edema BL  Lungs: No accessory muscle use. No acute respiratory distress. Crackles audible over posterior lobes   Abdomen:  Soft, nontender and nondistended.  No organomegaly  Neuro: AOx3. Moves all extremities. Follows commands. Responds appropriately   Skin:  No rash or erythema noted    Laboratory:  CBC:   Recent Labs   Lab 05/25/20  0151   WBC 3.90   RBC 4.95   HGB 11.0*   HCT 37.2*      MCV 75*   MCH 22.2*   MCHC 29.6*     CMP:   Recent Labs   Lab 05/24/20 2054 05/25/20  0151   * 117*   CALCIUM 8.1* 7.8*   ALBUMIN 2.9*  --    PROT 7.1  --    * 133*   K 3.4* 3.5   CO2 25 27    97   BUN 11 10   CREATININE 0.8 0.8   ALKPHOS 55  --    ALT 17  --    AST 22  --    BILITOT 0.8  --      Cardiac markers:   Recent Labs   Lab 05/25/20  0508   TROPONINI 0.060*       Diagnostic Results:  X-Ray: Reviewed     Imaging Results          X-Ray Chest AP Portable (Final result)  Result time 05/24/20 20:19:21    Final result by Ashlie Barillas MD (05/24/20 20:19:21)                 Narrative:    PROCEDURE:   XR CHEST AP PORTABLE  dated  5/24/2020 8:17 PM    CLINICAL HISTORY:   Male 69 years of age.   Suspected Covid-19 Virus  Infection    TECHNIQUE: AP view  of the chest obtained portably at 8:17 PM.    PREVIOUS STUDIES:  None Available    FINDINGS:    Implantable defibrillator leads project over the right atrium and  right ventricle. Cardiac silhouette is mildly enlarged. There are  extensive bilateral reticulonodular and groundglass infiltrates. The  alveolar infiltrates are mildly worse in the right than the left lung.  Some of the dense infiltrates are unequivocally within the periphery  of the lung. There is no pleural effusion or pneumothorax. Bones are  unremarkable.    IMPRESSION:    Bilateral interstitial and alveolar infiltrates in a pattern that is  compatible, but is not diagnostic, of Covid pneumonia.    Electronically Signed by Ashlie Barillas on 5/25/2020 6:17 AM                                ASSESSMENT/PLAN:       Active Hospital Problems    Diagnosis  POA    *Acute on chronic systolic heart failure [I50.23]  Yes    Elevated troponin [R79.89]  Yes    Microcytic anemia [D50.9]  Yes    Paroxysmal atrial fibrillation [I48.0]  Yes    Acute respiratory failure with hypoxia [J96.01]  Yes    Sarcoidosis [D86.9]  Yes    Chronic leukopenia [D72.819]  Yes      Resolved Hospital Problems    Diagnosis Date Resolved POA    Hypokalemia [E87.6] 05/25/2020 Yes         Plan:   Supplemental oxygen; wean as tolerated   Continue IV furosemide  Elevated troponin peaked at 0.61 ng/ml - likely due to demand ischemia   Await cardiology input. Follow echocardiogram  Strict input-output charting, daily weights and heart failure diet  D/c levofloxacin - procalcitonin is normal and also has prolonged QTc  Iron panel ordered due to microcytic anemia - ?due to leflunomide   Reports non-compliance to aspirin  Continue aspirin, clopidogrel, beta blocker, ARB and spironolactone       VTE Risk Mitigation (From admission, onward)         Ordered     IP VTE HIGH RISK PATIENT  Once      05/24/20 0159     Place sequential compression device  Until discontinued      05/24/20 1390                   Department Hospital Medicine  [facility]  Michael Powers MD  Date of service: 05/25/2020

## 2020-05-25 NOTE — PLAN OF CARE
Problem: Adult Inpatient Plan of Care  Goal: Plan of Care Review  Outcome: Ongoing, Progressing  Flowsheets (Taken 5/25/2020 1453)  Plan of Care Reviewed With: patient     Problem: Fall Injury Risk  Goal: Absence of Fall and Fall-Related Injury  Outcome: Ongoing, Progressing  Intervention: Promote Injury-Free Environment  Flowsheets (Taken 5/25/2020 1453)  Environmental Safety Modification: clutter free environment maintained; assistive device/personal items within reach     Problem: Adult Inpatient Plan of Care  Goal: Patient-Specific Goal (Individualization)  Flowsheets (Taken 5/25/2020 1453)  Individualized Care Needs: NONE  Anxieties, Fears or Concerns: NONE  Patient-Specific Goals (Include Timeframe): IMPROVEDRESPIRATORY STATUS

## 2020-05-25 NOTE — HPI
Mr. Madrid presents today with complaints of SOB. It is severe, began today at 11 am and has gotten progressively worse. It is associated with nonproductive cough since he had his PM placed in jan., orthopnea, worsening NIEVES, and 6 lb wt loss. He denies N/V/D, dizziness, LOC, headache, chest pain, or fever. He has a history of sarcoidosis and is [facility]. 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 He is AAOx4 on exam, still appears SOB on the BiPAP, but states he feels better. He has no known sick contacts and was negative for covid19 in the ED. He has 2 charts in epic that need to be converted to 1 so chart review was difficult. Discussed code status, and he wishes to be a full code.

## 2020-05-25 NOTE — H&P
[facility] Medicine  History & Physical    DOS: 5/24/2020  Time: 2300    Patient Name: Trevor Madrid  MRN: [id number]  Admission Date: 5/24/2020  Attending Physician: Dr. Mello  Primary Care Provider: Gerald Romero MD         Patient information was obtained from patient and ER records.     Subjective:     Principal Problem:Acute hypoxemic respiratory failure    Chief Complaint:   Chief Complaint   Patient presents with    Shortness of Breath    Cough        HPI: Mr. Madrid presents today with complaints of SOB. It is severe, began today at 11 am and has gotten progressively worse. It is associated with nonproductive cough since he had his PM placed in jan., orthopnea, worsening NIEVES, and 6 lb wt loss. He denies N/V/D, dizziness, LOC, headache, chest pain, or fever. He has a history of sarcoidosis and is followed [facility]. 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 He is AAOx4 on exam, still appears SOB on the BiPAP, but states he feels better. He has no known sick contacts and was negative for covid19 in the ED. He has 2 charts in epic that need to be converted to 1 so chart review was difficult. Discussed code status, and he wishes to be a full code.    Past Medical History:   Diagnosis Date    AICD (automatic cardioverter/defibrillator) present     Heart failure     Histoplasmosis     Sarcoidosis        Past Surgical History:   Procedure Laterality Date    CARDIAC DEFIBRILLATOR PLACEMENT Left 01/2020       Review of patient's allergies indicates:  No Known Allergies    No current facility-administered medications on file prior to encounter.      No current outpatient medications on file prior to encounter.     Family History     Problem Relation (Age of Onset)    Hypertension Father        Tobacco Use    Smoking status: Former Smoker   Substance and Sexual  Activity    Alcohol use: Not Currently    Drug use: Never    Sexual activity: Not on file     Review of Systems   Constitutional: Positive for fatigue. Negative for chills, diaphoresis and fever.   HENT: Negative for congestion, ear pain, sore throat and trouble swallowing.    Eyes: Negative for pain, discharge and visual disturbance.   Respiratory: Positive for cough, shortness of breath and wheezing. Negative for chest tightness.    Cardiovascular: Negative for chest pain, palpitations and leg swelling.   Gastrointestinal: Negative for abdominal distention, abdominal pain, blood in stool, constipation, diarrhea, nausea and vomiting.   Endocrine: Negative for polydipsia, polyphagia and polyuria.   Genitourinary: Negative for dysuria, flank pain, frequency and urgency.   Musculoskeletal: Negative for back pain, joint swelling, neck pain and neck stiffness.   Skin: Negative for rash and wound.   Allergic/Immunologic: Negative for immunocompromised state.   Neurological: Positive for weakness. Negative for dizziness, syncope, speech difficulty, light-headedness, numbness and headaches.   Hematological: Negative for adenopathy.   Psychiatric/Behavioral: Negative for confusion and suicidal ideas. The patient is not nervous/anxious.    All other systems reviewed and are negative.    Objective:     Vital Signs (Most Recent):  Temp: 99.1 °F (37.3 °C) (05/24/20 1930)  Pulse: 98 (05/24/20 2345)  Resp: (!) 26 (05/24/20 2345)  BP: (!) 145/92 (05/24/20 2330)  SpO2: 95 % (05/24/20 2345) Vital Signs (24h Range):  Temp:  [99.1 °F (37.3 °C)] 99.1 °F (37.3 °C)  Pulse:  [] 98  Resp:  [24-67] 26  SpO2:  [92 %-99 %] 95 %  BP: (132-145)/() 145/92     Weight: 68 kg (150 lb)  Body mass index is 22.81 kg/m².    Physical Exam   Constitutional: He is oriented to person, place, and time. He appears well-developed and well-nourished.   HENT:   Head: Normocephalic and atraumatic.   Eyes: Pupils are equal, round, and reactive to  light. Conjunctivae and EOM are normal.   Neck: Normal range of motion. Neck supple.   Cardiovascular: Normal heart sounds and intact distal pulses.   Tachycardia, irregular rhythm   Pulmonary/Chest: He has wheezes. He has rales.   Tachypnea on bipap  Coarseness bilat left worse than right   Abdominal: Soft. Bowel sounds are normal.   Musculoskeletal: Normal range of motion.   Neurological: He is alert and oriented to person, place, and time.   Skin: Skin is warm and dry. Capillary refill takes less than 2 seconds.   Psychiatric: He has a normal mood and affect. His behavior is normal. Judgment and thought content normal.   Nursing note and vitals reviewed.        CRANIAL NERVES     CN III, IV, VI   Pupils are equal, round, and reactive to light.  Extraocular motions are normal.        Significant Labs:   CBC:   Recent Labs   Lab 05/24/20 2054   WBC 3.72*   HGB 11.0*   HCT 36.5*        CMP:   Recent Labs   Lab 05/24/20 2054   *   K 3.4*      CO2 25   *   BUN 11   CREATININE 0.8   CALCIUM 8.1*   PROT 7.1   ALBUMIN 2.9*   BILITOT 0.8   ALKPHOS 55   AST 22   ALT 17   ANIONGAP 10   EGFRNONAA >60.0     Cardiac Markers:   Recent Labs   Lab 05/24/20 2054   BNP 1,108*     Troponin:   Recent Labs   Lab 05/24/20 2054   TROPONINI 0.051*       Significant Imaging: CXR: I have reviewed all pertinent results/findings within the past 24 hours and my personal findings are:  bilat interstitial opacities increased from prior with chronic scarring      EKG: Sinus tach with PVCs nonspecific ST and T wave abnormalities     Assessment/Plan:     * Acute hypoxemic respiratory failure  Admit to ICU on bipap  Likely d/t heart failure   CXR with vascular congestion vs. pna  Will cover with levaquin and also diurese  Nebs q6h  COVID19 negative        Elevated troponin  ASA/plavix  Trend   Likely demand ischemia   Dr. Romero consulted      Chronic leukopenia  Likely YUAN vs. Sarcoid meds  monitor      Acute on  chronic combined systolic and diastolic heart failure  Lasix 40 mg IV BID  Consult Dr. Romero  Repeat Echo  Interrogate AICD  Daily wt  Accurate I&O  BNP 1100        Hypokalemia  Replete then repeat level in AM   Further electrolyte replacement per SS      Sarcoidosis  Aware        VTE Risk Mitigation (From admission, onward)         Ordered     IP VTE HIGH RISK PATIENT  Once      05/24/20 2329     Place sequential compression device  Until discontinued      05/24/20 2329                   Lana Dumont NP  Department of Hospital Medicine   [facility]

## 2020-05-25 NOTE — CONSULTS
Critical access hospital  Cardiology  Consult Note    Patient Name: Trevor Madrid  MRN: [id number]  Admission Date: 5/24/2020  Hospital Length of Stay: 1 days  Code Status: Full Code   Attending Provider: Michael Powers MD   Consulting Provider: Gerald Romero MD  Primary Care Physician: Gerald Romero MD  Principal Problem:Acute on chronic systolic heart failure    Patient information was obtained from patient, past medical records and ER records.     Consults  Subjective:     REASON FOR CONSULT:  CHF     HPI:  70-year-old male with a past medical history significant for cardiomyopathy, paroxysmal atrial fibrillation, congestive heart failure, coronary artery disease status post stent placement, sarcoidosis presented to the hospital with complaints of increased shortness of breath and cough.  Dry cough has been going on for the past several months.  He denies any chest pain.  He was noted to be in acute on chronic congestive heart failure and he was diuresed and currently feels better.    Past Medical History:   Diagnosis Date    AICD (automatic cardioverter/defibrillator) present     Atrial fibrillation     Heart failure     Histoplasmosis     Hypertension     Sarcoidosis     Thyroid disease        Past Surgical History:   Procedure Laterality Date    CARDIAC DEFIBRILLATOR PLACEMENT Left 01/2020       Review of patient's allergies indicates:  No Known Allergies    No current facility-administered medications on file prior to encounter.      Current Outpatient Medications on File Prior to Encounter   Medication Sig    aspirin (ECOTRIN) 81 MG EC tablet Take 81 mg by mouth once daily.    clopidogreL (PLAVIX) 75 mg tablet Take 75 mg by mouth once daily.    finasteride (PROSCAR) 5 mg tablet Take 5 mg by mouth once daily.    folic acid (FOLVITE) 1 MG tablet Take 1 mg by mouth once daily.    furosemide (LASIX) 20 MG tablet Take 20 mg by mouth 2 (two) times daily.    leflunomide (ARAVA) 20 MG  Tab Take 20 mg by mouth once daily.    levothyroxine (SYNTHROID) 100 MCG tablet Take 100 mcg by mouth before breakfast.    loratadine (CLARITIN) 10 mg tablet Take 10 mg by mouth once daily.    losartan (COZAAR) 25 MG tablet Take 25 mg by mouth once daily.    methotrexate 5 MG tablet Take 2.5 mg by mouth.    metoprolol succinate (TOPROL-XL) 50 MG 24 hr tablet Take 50 mg by mouth once daily.    pantoprazole (PROTONIX) 40 MG tablet Take 40 mg by mouth once daily.    prednisoLONE acetate (PRED FORTE) 1 % DrpS 1 drop 4 (four) times daily.    spironolactone (ALDACTONE) 25 MG tablet Take 25 mg by mouth once daily.    thiamine 100 MG tablet Take 100 mg by mouth once daily.       Scheduled Meds:   aspirin  81 mg Oral Daily    cetirizine  10 mg Oral Daily    clopidogreL  75 mg Oral Daily    finasteride  5 mg Oral Daily    folic acid  1 mg Oral Daily    furosemide (LASIX) IV  40 mg Intravenous BID    ipratropium  0.5 mg Nebulization Q6H    leflunomide  20 mg Oral Daily    levothyroxine  100 mcg Oral Before breakfast    losartan  25 mg Oral Daily    metoprolol succinate  50 mg Oral Daily    pantoprazole  40 mg Oral Daily    prednisoLONE acetate  1 drop Both Eyes QID    senna-docusate 8.6-50 mg  1 tablet Oral BID    spironolactone  25 mg Oral Daily    thiamine  100 mg Oral Daily     Continuous Infusions:  PRN Meds:.acetaminophen, calcium chloride IVPB, calcium chloride IVPB, calcium chloride IVPB, magnesium oxide, magnesium sulfate IVPB, magnesium sulfate IVPB, magnesium sulfate IVPB, magnesium sulfate IVPB, melatonin, ondansetron, potassium chloride in water, potassium chloride in water, potassium chloride in water, potassium chloride in water, potassium chloride, potassium chloride, potassium chloride, potassium chloride, sodium chloride 0.9%, sodium phosphate IVPB, sodium phosphate IVPB, sodium phosphate IVPB, sodium phosphate IVPB, sodium phosphate IVPB    Family History     Problem Relation (Age of  Onset)    Hypertension Father          Tobacco Use    Smoking status: Former Smoker   Substance and Sexual Activity    Alcohol use: Not Currently    Drug use: Never    Sexual activity: Not on file       ROS   No significant headaches or sore throat or runny nose.   No recent changes in vision.   No recent changes in hearing.  No dysphagia or odynophagia.  Reports shortness of breath.   Denies anyhemoptysis.   Denies any abdominal pain, nausea, vomiting, diarrhea or constipation.   Denies any dysuria or polyuria.   Denies any fevers or chills.   Denies any recent significant weight changes.   Denies bleeding diathesis    Objective:     Vital Signs (Most Recent):  Temp: 97.9 °F (36.6 °C) (05/25/20 0715)  Pulse: 99 (05/25/20 1100)  Resp: (!) 38 (05/25/20 1100)  BP: 125/80 (05/25/20 1100)  SpO2: 96 % (05/25/20 1100) Vital Signs (24h Range):  Temp:  [96.5 °F (35.8 °C)-99.1 °F (37.3 °C)] 97.9 °F (36.6 °C)  Pulse:  [] 99  Resp:  [20-67] 38  SpO2:  [92 %-100 %] 96 %  BP: (104-145)/() 125/80     Weight: 68.5 kg (151 lb 0.2 oz)  Body mass index is 22.96 kg/m².    SpO2: 96 %  O2 Device (Oxygen Therapy): BiPAP      Intake/Output Summary (Last 24 hours) at 5/25/2020 1232  Last data filed at 5/25/2020 1100  Gross per 24 hour   Intake 390 ml   Output 3150 ml   Net -2760 ml       Lines/Drains/Airways     Peripheral Intravenous Line                 Peripheral IV - Single Lumen 05/24/20 2100 18 G Left Forearm less than 1 day         Peripheral IV - Single Lumen 05/25/20 0000 20 G Right Forearm less than 1 day                Physical Exam  HEENT: Normocephalic, atraumatic, PERRL, Conjunctiva pink, no scleral icterus.   CVS: S1S2+, RRR, no murmurs, rubs or gallops, JVP: Normal.  LUNGS: Clear  ABDOMEN: Soft, NT, BS+  EXTREMITIES: No cyanosis, clubbing or edema  NEURO: AAO X 3.       Significant Labs:   BMP:   Recent Labs   Lab 05/24/20 2054 05/25/20  0151   * 117*   * 133*   K 3.4* 3.5    97   CO2 25  27   BUN 11 10   CREATININE 0.8 0.8   CALCIUM 8.1* 7.8*   MG 1.9 1.8   , CMP   Recent Labs   Lab 05/24/20 2054 05/25/20  0151   * 133*   K 3.4* 3.5    97   CO2 25 27   * 117*   BUN 11 10   CREATININE 0.8 0.8   CALCIUM 8.1* 7.8*   PROT 7.1  --    ALBUMIN 2.9*  --    BILITOT 0.8  --    ALKPHOS 55  --    AST 22  --    ALT 17  --    ANIONGAP 10 9   ESTGFRAFRICA >60.0 >60.0   EGFRNONAA >60.0 >60.0   , CBC   Recent Labs   Lab 05/24/20 2054 05/25/20  0151   WBC 3.72* 3.90   HGB 11.0* 11.0*   HCT 36.5* 37.2*    249   , INR No results for input(s): INR, PROTIME in the last 48 hours., Lipid Panel No results for input(s): CHOL, HDL, LDLCALC, TRIG, CHOLHDL in the last 48 hours. and Troponin   Recent Labs   Lab 05/24/20 2054 05/25/20  0151 05/25/20  0508   TROPONINI 0.051* 0.061* 0.060*       Significant Imaging: Reviewed  Assessment and Plan:     IMPRESSION:  Congestive heart failure.  Reduced LV systolic function.  Acute on chronic.    Minimally elevated troponin.  Likely secondary to congestive heart failure.  Not consistent with ACS.  Patient denies any chest pain.    Persistent cough.  Etiology?    History of sarcoidosis and questionable histoplasmosis.    Cardiomyopathy with depressed LV systolic function LVEF 40% with mildly dilated left ventricular cavity.  Etiology?  Likely nonischemic.  Treadmill Myoview stress test negative for reversible ischemia 6/2018. [facility] (patient brought report on 5/6/19) LVEF 30-35%. LVEDD 6.5 cm. Repeat echo on 8/9/2019 showed LVEF of ~35% with LVEDD of 6.4 cm.  MUGA scan LVEF 30%. Using Life Vest. Appears to be euvolemic by exam. s/p angiogram 9/16/2019 - 70% mid LAD lesion that was significant by FFR assessment (0.72) s/p successful PCI with a 2.75 X 18 mm Himanshu Resolute DARIN with 0% residual stenosis and MAKENZIE 3 flow.  80% proximal first diagonal stenosis s/p successful PCI with a 2.0 X 8 mm Resolute [place] DARIN with 0% residual stenosis and MAKENZIE 3  flow. Denies chest pain.  Repeat echocardiogram on 01/17/2020 with LVEF of 30%.     s/p ICD placement. [employer] device. Complicated by small left apical pneumothorax. Resolved.      Atrial fibrillation with rapid ventricular response.  Holter monitor on 6/7/2018 showed several episodes of A. fib and patient was in A. fib at 59% of the time.  Nonvalvular.  Currently in sinus rhythm by exam. Remains in SR by exam.     Hypertension.  Controlled.    Hypothyroidism.        RECOMMENDATIONS:  1.  Continue current medical regimen without any changes including IV diuresis.  2.  Consider pulmonary consult in view of his history of sarcoidosis, histoplasmosis and persistent cough.  3.  Keep potassium greater than 4.0 and magnesium greater than 2.0.  4.  Further decisions to follow based upon the hospital course.  5.  Discussed with Dr. Powers.    Thank you for your consult. I will follow-up with patient. Please contact us if you have any additional questions.    Gerald Romero MD  Cardiology   [facility]

## 2020-05-25 NOTE — PLAN OF CARE
05/25/20 0050   Patient Assessment/Suction   Level of Consciousness (AVPU) alert   Respiratory Effort Mild   Expansion/Accessory Muscles/Retractions expansion symmetric;no retractions;no use of accessory muscles   PRE-TX-O2   O2 Device (Oxygen Therapy) BiPAP   Oxygen Concentration (%) 30   SpO2 99 %   Pulse Oximetry Type Continuous   $ Pulse Oximetry - Multiple Charge Pulse Oximetry - Multiple   Pulse 100   Resp (!) 36   Preset CPAP/BiPAP Settings   Mode Of Delivery BiPAP   $ Is patient using? Yes   Size of Mask Large   Sized Appropriately? Yes   Equipment Type V60   Airway Device Type large full face mask   Ipap 12   EPAP (cm H2O) 6   Pressure Support (cm H2O) 6   Set Rate (Breaths/Min) 12   ITime (sec) 0.9   Rise Time (sec) 2   Patient CPAP/BiPAP Settings   RR Total (Breaths/Min) 36   Tidal Volume (mL) 610   VE Minute Ventilation (L/min) 23.1 L/min   Peak Inspiratory Pressure (cm H2O) 13   TiTOT (%) 44   Total Leak (L/Min) 0   Patient Trigger - ST Mode Only (%) 63   CPAP/BiPAP Alarms   High Pressure (cm H2O) 40   Low Pressure (cm H2O) 2   Minute Ventilation (L/Min) 2   High RR (breaths/min) 50   Low RR (breaths/min) 10   Apnea (Sec) 20   Respiratory Evaluation   $ Care Plan Tech Time 15 min   Evaluation For New Orders

## 2020-05-25 NOTE — NURSING TRANSFER
Nursing Transfer Note      5/25/2020     Transfer to room 2531 from room 2215    Transfer via hospital bed    Transfer with monitor    Transported by Arnel Corona RN    Medicines sent: Yes    Chart send with patient: Yes    Notified: MD    Patient reassessed at:11:35    Upon arrival to floor: Patient was repositioned in the bed.

## 2020-05-25 NOTE — RESPIRATORY THERAPY
05/24/20 2203   Patient Assessment/Suction   Level of Consciousness (AVPU) alert   Respiratory Effort Mild   Expansion/Accessory Muscles/Retractions accessory muscle use   All Lung Fields Breath Sounds crackles, fine   PRE-TX-O2   O2 Device (Oxygen Therapy) BiPAP   Oxygen Concentration (%) 30   SpO2 99 %   Pulse (!) 114   Resp (!) 26   Equipment Change   $ RT Equipment medium full face mask   Respiratory Interventions   NPPV/CPAP Maintenance mask secure   Ready to Wean/Extubation Screen   FIO2<=50 (chart decimal) 0.3   Preset CPAP/BiPAP Settings   Mode Of Delivery BiPAP S/T   $ Initial CPAP/BiPAP Setup? Yes   $ Is patient using? Yes   Size of Mask Large   Sized Appropriately? Yes   Equipment Type V60   Airway Device Type large full face mask   Ipap 12   EPAP (cm H2O) 6   Pressure Support (cm H2O) 6   ITime (sec) 1   Rise Time (sec) 0.1   Patient CPAP/BiPAP Settings   RR Total (Breaths/Min) 26   Tidal Volume (mL) 519   VE Minute Ventilation (L/min) 20 L/min   Peak Inspiratory Pressure (cm H2O) 13   TiTOT (%) 42   Total Leak (L/Min) 1   Patient Trigger - ST Mode Only (%) 93   Respiratory Evaluation   $ Care Plan Tech Time 15 min

## 2020-05-25 NOTE — ED NOTES
Patient identifiers for Trevor Madrid checked and correct.  LOC:  Trevor Madrid is awake, alert, and aware of environment with an appropriate affect. He is oriented x 3 and speaking appropriately.  APPEARANCE:  He is resting comfortably and in no acute distress. He is clean and well groomed, patient's clothing is properly fastened.  SKIN:  The skin is warm and dry. He has normal skin turgor and moist mucus membranes. Skin is intact; no bruising or breakdown noted.  MUSCULOSKELETAL:  He is moving all extremities well, no obvious deformities noted. Pulses intact.   RESPIRATORY:  Airway is open and patent. Respirations are spontaneous and labored. Patient does have a dry cough and states that some times he brings up white colored sputum. He also states that he has been short of breath for a couple of days.  CARDIAC:  He has a normal rate and rhythm. No peripheral edema noted. Capillary refill < 3 seconds.  ABDOMEN:  No distention noted.  Soft and non-tender upon palpation. Normal bowel sounds heard in all 4 quads  NEUROLOGICAL:  PERRL. Facial expression is symmetrical. Hand grasps are equal bilaterally. Normal sensation in all extremities when touched with finger.  Allergies reported:  Review of patient's allergies indicates:  No Known Allergies  OTHER NOTES:

## 2020-05-25 NOTE — ED PROVIDER NOTES
Encounter Date: 5/24/2020       History     Chief Complaint   Patient presents with    Shortness of Breath    Cough     Patient here with reported shortness of breath, cough with low-grade fever on arrival in the ER patient did not notice any fever on he states he has had difficulty with cough since his defibrillator/pacemaker was placed in January he reports sinus congestion postnasal drip with need to clear his throat frequently he does cough up clear sputum at times he denies any lower extremity edema it he does report bilateral knee pain        Review of patient's allergies indicates:  No Known Allergies  No past medical history on file.  No past surgical history on file.  No family history on file.  Social History     Tobacco Use    Smoking status: Not on file   Substance Use Topics    Alcohol use: Not on file    Drug use: Not on file     Review of Systems   Constitutional: Positive for fatigue and fever. Negative for appetite change and chills.   HENT: Positive for congestion.    Eyes: Negative.    Respiratory: Positive for cough and shortness of breath.    Cardiovascular: Negative for chest pain, palpitations and leg swelling.   Gastrointestinal: Negative.    Endocrine: Negative.    Genitourinary: Negative.    Musculoskeletal: Positive for arthralgias.   Skin: Negative.    Allergic/Immunologic: Negative.    Neurological: Negative.    Hematological: Negative.    Psychiatric/Behavioral: Negative.        Physical Exam     Initial Vitals [05/24/20 1930]   BP Pulse Resp Temp SpO2   (!) 142/108 (!) 121 (!) 30 99.1 °F (37.3 °C) (!) 92 %      MAP       --         Physical Exam    Constitutional: He appears well-developed and well-nourished. He appears ill. He appears distressed.   HENT:   Head: Normocephalic and atraumatic.   Mouth/Throat: Oropharynx is clear and moist.   Eyes: EOM are normal. Pupils are equal, round, and reactive to light.   Neck: Normal range of motion.   Cardiovascular: Regular rhythm and  intact distal pulses.   Tachycardic   Pulmonary/Chest: Tachypnea noted. He has decreased breath sounds. He has rales in the right lower field, the left upper field, the left middle field and the left lower field.   Abdominal: Soft. Bowel sounds are normal. He exhibits no distension. There is no tenderness.   Musculoskeletal: Normal range of motion.        Right lower leg: Normal. He exhibits no tenderness and no edema.        Left lower leg: Normal. He exhibits no tenderness and no edema.   Neurological: He is alert and oriented to person, place, and time.   Skin: Skin is warm and dry. Capillary refill takes less than 2 seconds.   Psychiatric: He has a normal mood and affect. His behavior is normal.         ED Course   Procedures  Labs Reviewed   CULTURE, BLOOD   CULTURE, BLOOD   CBC W/ AUTO DIFFERENTIAL   COMPREHENSIVE METABOLIC PANEL   C-REACTIVE PROTEIN   FERRITIN   LACTATE DEHYDROGENASE   CK   LACTIC ACID, PLASMA   TROPONIN I   SARS-COV-2 RNA AMPLIFICATION, QUAL   B-TYPE NATRIURETIC PEPTIDE          Imaging Results          X-Ray Chest AP Portable (In process)                  Medical Decision Making:   ED Management:  Patient given albuterol inhaler in the emergency department with some improvement in his air flow but no resolution is corona virus screen was negative use placed on BiPAP chest x-ray is consistent with bilateral infiltrates BNP is elevated I have given patient a dose of Lasix in the emergency department he is tolerating BiPAP at this time will admit for further evaluation and treatment              Attending Attestation:         Attending Critical Care:   Critical Care Times:   Direct Patient Care (initial evaluation, reassessments, and time considering the case)................................................................18 minutes.    Documentation..................................................................................................................................................................................15 minutes.   Consultation with other Physicians. .................................................................................................................................................5 minutes.   ==============================================================  · Total Critical Care Time - exclusive of procedural time: 38 minutes.  ==============================================================  Critical care was necessary to treat or prevent imminent or life-threatening deterioration of the following conditions: congestive heart failure.   Critical care was time spent personally by me on the following activities: examination of patient, obtaining history from patient or relative, review of x-rays / CT sent with the patient, ordering lab, x-rays, and/or EKG, evaluation of patient's response to treatment and discussion with consultants.   Critical Care Condition: potentially life-threatening                             Clinical Impression:       ICD-10-CM ICD-9-CM   1. Congestive heart failure, unspecified HF chronicity, unspecified heart failure type I50.9 428.0   2. Suspected Covid-19 Virus Infection R68.89                                 Willam Malik MD  05/24/20 8892

## 2020-05-25 NOTE — RESPIRATORY THERAPY
This note also relates to the following rows which could not be included:  SpO2 - Cannot attach notes to unvalidated device data  Pulse - Cannot attach notes to unvalidated device data  Resp - Cannot attach notes to unvalidated device data       05/24/20 8720   Patient Assessment/Suction   Level of Consciousness (AVPU) alert   Respiratory Effort Mild   Expansion/Accessory Muscles/Retractions accessory muscle use   LLL Breath Sounds crackles, fine   PRE-TX-O2   O2 Device (Oxygen Therapy) BiPAP   Oxygen Concentration (%) 30   Ready to Wean/Extubation Screen   FIO2<=50 (chart decimal) 0.3   Preset CPAP/BiPAP Settings   Mode Of Delivery BiPAP S/T   $ Is patient using? Yes   Patient CPAP/BiPAP Settings   RR Total (Breaths/Min) 40   Tidal Volume (mL) 533   VE Minute Ventilation (L/min) 19 L/min   Labs   $ Was an ABG obtained? Arterial Puncture;ISTAT - Blood gas   $ Labs Tech Time 15 min   Respiratory Evaluation   $ Care Plan Tech Time 15 min

## 2020-05-25 NOTE — RESPIRATORY THERAPY
05/24/20 2142   Patient Assessment/Suction   Level of Consciousness (AVPU) alert   Respiratory Effort Mild   Expansion/Accessory Muscles/Retractions accessory muscle use   All Lung Fields Breath Sounds crackles, fine;diminished   Rhythm/Pattern, Respiratory breathlessness;shortness of breath   Cough Frequency infrequent   Cough Type dry;good;nonproductive   PRE-TX-O2   O2 Device (Oxygen Therapy) nasal cannula   $ Is the patient on Low Flow Oxygen? Yes   Flow (L/min) 2   SpO2 (!) 92 %   Pulse Oximetry Type Continuous   $ Pulse Oximetry - Multiple Charge Pulse Oximetry - Multiple   Pulse (!) 115   Resp (!) 30   Inhaler   $ Inhaler Charges MDI (Metered Dose Inahler) Treatment;Given With Spacer;Spacer - Equipment   Daily Review of Necessity (Inhaler) completed   Respiratory Treatment Status (Inhaler) given   Treatment Route (Inhaler) mouthpiece   Patient Position (Inhaler) Nicholson's   Post Treatment Assessment (Inhaler) increased aeration;wheezing, new onset   Signs of Intolerance (Inhaler) none   Breath Sounds Post-Respiratory Treatment   Throughout All Fields Post-Treatment All Fields   Throughout All Fields Post-Treatment aeration increased;crackles, fine;wheezes, expiratory   Post-treatment Heart Rate (beats/min) 119   Post-treatment Resp Rate (breaths/min) 26   Respiratory Interventions   Cough And Deep Breathing done with encouragement   Breathing Techniques/Airway Clearance deep/controlled cough encouraged;diaphragmatic breathing promoted   Respiratory Evaluation   $ Care Plan Tech Time 15 min   Evaluation For New Orders   Admitting Diagnosis SOB   Home Oxygen   Has Home Oxygen? No   Home Aerosol, MDI, DPI, and Other Treatments/Therapies   Home Respiratory Therapy Per Patient/Review of Chart No

## 2020-05-25 NOTE — ASSESSMENT & PLAN NOTE
Admit to ICU on bipap  Likely d/t heart failure   CXR with vascular congestion vs. pna  Will cover with levaquin and also diurese  Nebs q6h  COVID19 negative

## 2020-05-26 VITALS
SYSTOLIC BLOOD PRESSURE: 106 MMHG | DIASTOLIC BLOOD PRESSURE: 76 MMHG | HEART RATE: 90 BPM | TEMPERATURE: 98 F | HEIGHT: 68 IN | BODY MASS INDEX: 20.95 KG/M2 | WEIGHT: 138.25 LBS | OXYGEN SATURATION: 97 % | RESPIRATION RATE: 18 BRPM

## 2020-05-26 PROBLEM — J96.01 ACUTE RESPIRATORY FAILURE WITH HYPOXIA: Status: RESOLVED | Noted: 2020-05-25 | Resolved: 2020-05-26

## 2020-05-26 PROBLEM — E87.6 HYPOKALEMIA: Status: RESOLVED | Noted: 2020-05-24 | Resolved: 2020-05-26

## 2020-05-26 LAB
ANION GAP SERPL CALC-SCNC: 13 MMOL/L (ref 8–16)
BASOPHILS # BLD AUTO: 0.04 K/UL (ref 0–0.2)
BASOPHILS NFR BLD: 1.4 % (ref 0–1.9)
BUN SERPL-MCNC: 15 MG/DL (ref 8–23)
CALCIUM SERPL-MCNC: 8.2 MG/DL (ref 8.7–10.5)
CHLORIDE SERPL-SCNC: 100 MMOL/L (ref 95–110)
CO2 SERPL-SCNC: 27 MMOL/L (ref 23–29)
CREAT SERPL-MCNC: 0.9 MG/DL (ref 0.5–1.4)
DIFFERENTIAL METHOD: ABNORMAL
EOSINOPHIL # BLD AUTO: 0.1 K/UL (ref 0–0.5)
EOSINOPHIL NFR BLD: 2.7 % (ref 0–8)
ERYTHROCYTE [DISTWIDTH] IN BLOOD BY AUTOMATED COUNT: 14.1 % (ref 11.5–14.5)
EST. GFR  (AFRICAN AMERICAN): >60 ML/MIN/1.73 M^2
EST. GFR  (NON AFRICAN AMERICAN): >60 ML/MIN/1.73 M^2
GLUCOSE SERPL-MCNC: 93 MG/DL (ref 70–110)
HCT VFR BLD AUTO: 36.7 % (ref 40–54)
HGB BLD-MCNC: 11.1 G/DL (ref 14–18)
IMM GRANULOCYTES # BLD AUTO: 0.01 K/UL (ref 0–0.04)
IMM GRANULOCYTES NFR BLD AUTO: 0.3 % (ref 0–0.5)
LYMPHOCYTES # BLD AUTO: 0.3 K/UL (ref 1–4.8)
LYMPHOCYTES NFR BLD: 10.5 % (ref 18–48)
MAGNESIUM SERPL-MCNC: 2 MG/DL (ref 1.6–2.6)
MCH RBC QN AUTO: 22.6 PG (ref 27–31)
MCHC RBC AUTO-ENTMCNC: 30.2 G/DL (ref 32–36)
MCV RBC AUTO: 75 FL (ref 82–98)
MONOCYTES # BLD AUTO: 0.4 K/UL (ref 0.3–1)
MONOCYTES NFR BLD: 11.8 % (ref 4–15)
NEUTROPHILS # BLD AUTO: 2.2 K/UL (ref 1.8–7.7)
NEUTROPHILS NFR BLD: 73.3 % (ref 38–73)
NRBC BLD-RTO: 0 /100 WBC
PLATELET # BLD AUTO: 252 K/UL (ref 150–350)
PMV BLD AUTO: 11.7 FL (ref 9.2–12.9)
POTASSIUM SERPL-SCNC: 4 MMOL/L (ref 3.5–5.1)
RBC # BLD AUTO: 4.92 M/UL (ref 4.6–6.2)
SODIUM SERPL-SCNC: 140 MMOL/L (ref 136–145)
WBC # BLD AUTO: 2.96 K/UL (ref 3.9–12.7)

## 2020-05-26 PROCEDURE — 63600175 PHARM REV CODE 636 W HCPCS: Performed by: NURSE PRACTITIONER

## 2020-05-26 PROCEDURE — 94660 CPAP INITIATION&MGMT: CPT

## 2020-05-26 PROCEDURE — 27000221 HC OXYGEN, UP TO 24 HOURS

## 2020-05-26 PROCEDURE — 94761 N-INVAS EAR/PLS OXIMETRY MLT: CPT

## 2020-05-26 PROCEDURE — 25000003 PHARM REV CODE 250: Performed by: NURSE PRACTITIONER

## 2020-05-26 PROCEDURE — 80048 BASIC METABOLIC PNL TOTAL CA: CPT

## 2020-05-26 PROCEDURE — 99900035 HC TECH TIME PER 15 MIN (STAT)

## 2020-05-26 PROCEDURE — 83735 ASSAY OF MAGNESIUM: CPT

## 2020-05-26 PROCEDURE — 36415 COLL VENOUS BLD VENIPUNCTURE: CPT

## 2020-05-26 PROCEDURE — 85025 COMPLETE CBC W/AUTO DIFF WBC: CPT

## 2020-05-26 RX ORDER — FUROSEMIDE 20 MG/1
40 TABLET ORAL DAILY
Qty: 90 TABLET | Refills: 3 | Status: SHIPPED | OUTPATIENT
Start: 2020-05-26 | End: 2021-03-22 | Stop reason: SDUPTHER

## 2020-05-26 RX ORDER — POTASSIUM CHLORIDE 20 MEQ/1
20 TABLET, EXTENDED RELEASE ORAL DAILY
Qty: 30 TABLET | Refills: 3 | Status: SHIPPED | OUTPATIENT
Start: 2020-05-26 | End: 2020-08-24

## 2020-05-26 RX ADMIN — ASPIRIN 81 MG 81 MG: 81 TABLET ORAL at 08:05

## 2020-05-26 RX ADMIN — FUROSEMIDE 40 MG: 10 INJECTION, SOLUTION INTRAMUSCULAR; INTRAVENOUS at 08:05

## 2020-05-26 RX ADMIN — THIAMINE HCL TAB 100 MG 100 MG: 100 TAB at 08:05

## 2020-05-26 RX ADMIN — LOSARTAN POTASSIUM 25 MG: 25 TABLET, FILM COATED ORAL at 08:05

## 2020-05-26 RX ADMIN — SENNOSIDES AND DOCUSATE SODIUM 1 TABLET: 8.6; 5 TABLET ORAL at 08:05

## 2020-05-26 RX ADMIN — CLOPIDOGREL BISULFATE 75 MG: 75 TABLET, FILM COATED ORAL at 08:05

## 2020-05-26 RX ADMIN — FOLIC ACID 1 MG: 1 TABLET ORAL at 08:05

## 2020-05-26 RX ADMIN — PANTOPRAZOLE SODIUM 40 MG: 40 TABLET, DELAYED RELEASE ORAL at 05:05

## 2020-05-26 RX ADMIN — LEVOTHYROXINE SODIUM 100 MCG: 100 TABLET ORAL at 05:05

## 2020-05-26 RX ADMIN — FINASTERIDE 5 MG: 5 TABLET, FILM COATED ORAL at 08:05

## 2020-05-26 RX ADMIN — METOPROLOL SUCCINATE 50 MG: 50 TABLET, FILM COATED, EXTENDED RELEASE ORAL at 08:05

## 2020-05-26 RX ADMIN — CETIRIZINE HYDROCHLORIDE 10 MG: 10 TABLET, FILM COATED ORAL at 09:05

## 2020-05-26 RX ADMIN — SPIRONOLACTONE 25 MG: 25 TABLET ORAL at 08:05

## 2020-05-26 NOTE — HOSPITAL COURSE
Interval History:  Admitted for gradually worsening exertional dyspnea likely due to CHF exacerbation. Overnight he had significant diuresis;  net negative of 2L with one dose of 40 mg IV furosemide.  History of ischemic cardiomyopathy s/p AICD placement as well as paroxysmal atrial fibrillation.     05/26 Cleared by Cardiology for discharge with outpatient follow-up. Patient feels well. No CP/SOB.  VSS  Lungs: vesicular without adventitious  Heart: S1S2  Abdo: soft

## 2020-05-26 NOTE — ASSESSMENT & PLAN NOTE
Follow-up with cardiology; Add furosemide 40 mg qd and KCL 20 meq q day, continue pre-admit regimen; Follow-up with Cardiology

## 2020-05-26 NOTE — PLAN OF CARE
05/25/20 1942   Patient Assessment/Suction   Level of Consciousness (AVPU) alert   Respiratory Effort Normal;Unlabored   Expansion/Accessory Muscles/Retractions no use of accessory muscles   All Lung Fields Breath Sounds diminished;clear   PRE-TX-O2   O2 Device (Oxygen Therapy) nasal cannula   Flow (L/min) 2   SpO2 96 %   Pulse Oximetry Type Continuous   $ Pulse Oximetry - Multiple Charge Pulse Oximetry - Multiple   Pulse 94   Resp (!) 24   Aerosol Therapy   $ Aerosol Therapy Charges Aerosol Treatment   Daily Review of Necessity (SVN) completed   Respiratory Treatment Status (SVN) given   Treatment Route (SVN) mask   Patient Position (SVN) HOB elevated   Post Treatment Assessment (SVN) increased aeration   Signs of Intolerance (SVN) none   Breath Sounds Post-Respiratory Treatment   Throughout All Fields Post-Treatment All Fields   Throughout All Fields Post-Treatment aeration increased   Post-treatment Heart Rate (beats/min) 94   Post-treatment Resp Rate (breaths/min) 24   Preset CPAP/BiPAP Settings   Mode Of Delivery BiPAP S/T;Standby   change to q6prn/jbipap on s/b

## 2020-05-26 NOTE — DISCHARGE SUMMARY
[facility]  Discharge Summary      Patient Name: Trevor Madrid  MRN: [id number]  Admission Date: 5/24/2020  Hospital Length of Stay: 2 days  Discharge Date and Time:  05/26/2020 9:08 AM  Attending Physician: Robel Rivera MD   Discharging Provider: Robel Rivera MD  Primary Care Provider: Gerald Romero MD      HPI:   Mr. Madrid presents today with complaints of SOB. It is severe, began today at 11 am and has gotten progressively worse. It is associated with nonproductive cough since he had his PM placed in jan., orthopnea, worsening NIEVES, and 6 lb wt loss. He denies N/V/D, dizziness, LOC, headache, chest pain, or fever. He has a history of sarcoidosis and 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 He is AAOx4 on exam, still appears SOB on the BiPAP, but states he feels better. He has no known sick contacts and was negative for covid19 in the ED. He has 2 charts in epic that need to be converted to 1 so chart review was difficult. Discussed code status, and he wishes to be a full code.    * No surgery found *      Hospital Course:   Interval History:  Admitted for gradually worsening exertional dyspnea likely due to CHF exacerbation. Overnight he had significant diuresis;  net negative of 2L with one dose of 40 mg IV furosemide.  History of ischemic cardiomyopathy s/p AICD placement as well as paroxysmal atrial fibrillation.     05/26 Cleared by Cardiology for discharge with outpatient follow-up. Patient feels well. No CP/SOB.  VSS  Lungs: vesicular without adventitious  Heart: S1S2  Abdo: soft     Consults:   Consults (From admission, onward)        Status Ordering Provider     Inpatient consult to Cardiology  Once     Provider:  Gerald Romero MD    Acknowledged JOVANY AUGUSTIN          * Acute on chronic systolic heart  failure  Continue pre-admit regimen, as above        Paroxysmal atrial fibrillation  Follow-up with cardiology; Add furosemide 40 mg qd and KCL 20 meq q day, continue pre-admit regimen; Follow-up with Cardiology      Microcytic anemia  Continue pre-admit regimen      Elevated troponin  Continue pre-admit regimen      Chronic leukopenia  Continue pre-admit regimen      Sarcoidosis  Aware; Follow[facility]up at [facility]          Final Active Diagnoses:    Diagnosis Date Noted POA    PRINCIPAL PROBLEM:  Acute on chronic systolic heart failure [I50.23] 05/24/2020 Yes    Elevated troponin [R79.89] 05/25/2020 Yes    Microcytic anemia [D50.9] 05/25/2020 Yes    Paroxysmal atrial fibrillation [I48.0] 05/25/2020 Yes    Sarcoidosis [D86.9] 05/24/2020 Yes    Chronic leukopenia [D72.819] 05/24/2020 Yes      Problems Resolved During this Admission:    Diagnosis Date Noted Date Resolved POA    Acute respiratory failure with hypoxia [J96.01] 05/25/2020 05/26/2020 Yes    Hypokalemia [E87.6] 05/24/2020 05/26/2020 Yes       Discharged Condition: good    Disposition: Home or Self Care    Follow Up:  Follow-up Information     Ciera Romero MD In 1 week.    Specialties:  Cardiovascular Disease, Cardiology, INTERVENTIONAL CARDIOLOGY  Why:  post discharge follow-up  Contact information:  [address] [place] [address]  [address] [place] [id number] [phone]                 Patient Instructions:      Ambulatory referral/consult to Cardiology   Standing Status: Future   Referral Priority: Routine Referral Type: Consultation   Referral Reason: Specialty Services Required   Referred to Provider: CIERA ROMERO Requested Specialty: Cardiology   Number of Visits Requested: 1     Diet Cardiac     Activity as tolerated       Significant Diagnostic Studies: Labs:   BMP:   Recent Labs   Lab 05/24/20 2054 05/25/20  0151 05/26/20  0349   * 117* 93   * 133* 140   K 3.4* 3.5 4.0    97 100   CO2 25 27 27   BUN 11 10 15    CREATININE 0.8 0.8 0.9   CALCIUM 8.1* 7.8* 8.2*   MG 1.9 1.8 2.0    and CBC   Recent Labs   Lab 05/24/20 2054 05/25/20  0151 05/26/20  0349   WBC 3.72* 3.90 2.96*   HGB 11.0* 11.0* 11.1*   HCT 36.5* 37.2* 36.7*    249 252       Pending Diagnostic Studies:     None         Medications:  Reconciled Home Medications:      Medication List      START taking these medications    potassium chloride SA 20 MEQ tablet  Commonly known as:  K-DUR,KLOR-CON  Take 1 tablet (20 mEq total) by mouth once daily.        CHANGE how you take these medications    furosemide 20 MG tablet  Commonly known as:  LASIX  Take 2 tablets (40 mg total) by mouth once daily.  What changed:    · how much to take  · when to take this        CONTINUE taking these medications    aspirin 81 MG EC tablet  Commonly known as:  ECOTRIN  Take 81 mg by mouth once daily.     clopidogreL 75 mg tablet  Commonly known as:  PLAVIX  Take 75 mg by mouth once daily.     finasteride 5 mg tablet  Commonly known as:  PROSCAR  Take 5 mg by mouth once daily.     folic acid 1 MG tablet  Commonly known as:  FOLVITE  Take 1 mg by mouth once daily.     leflunomide 20 MG Tab  Commonly known as:  ARAVA  Take 20 mg by mouth once daily.     levothyroxine 100 MCG tablet  Commonly known as:  SYNTHROID  Take 100 mcg by mouth before breakfast.     loratadine 10 mg tablet  Commonly known as:  CLARITIN  Take 10 mg by mouth once daily.     losartan 25 MG tablet  Commonly known as:  COZAAR  Take 25 mg by mouth once daily.     metoprolol succinate 50 MG 24 hr tablet  Commonly known as:  TOPROL-XL  Take 50 mg by mouth once daily.     pantoprazole 40 MG tablet  Commonly known as:  PROTONIX  Take 40 mg by mouth once daily.     prednisoLONE acetate 1 % Drps  Commonly known as:  PRED FORTE  1 drop 4 (four) times daily.     spironolactone 25 MG tablet  Commonly known as:  ALDACTONE  Take 25 mg by mouth once daily.     thiamine 100 MG tablet  Take 100 mg by mouth once daily.        STOP  taking these medications    methotrexate 5 MG tablet            Indwelling Lines/Drains at time of discharge:   Lines/Drains/Airways     None                 Time spent on the discharge of patient: 32 minutes  Patient was seen and examined on the date of discharge and determined to be suitable for discharge.         Robel Rivera MD  Department of Hospital Medicine  [facility]

## 2020-05-29 LAB
BACTERIA BLD CULT: NORMAL
BACTERIA BLD CULT: NORMAL

## 2020-06-22 DIAGNOSIS — I48.91 ATRIAL FIBRILLATION: ICD-10-CM

## 2020-06-22 DIAGNOSIS — I10 ESSENTIAL HYPERTENSION, MALIGNANT: Primary | ICD-10-CM

## 2020-06-22 DIAGNOSIS — I50.9 HEART FAILURE, UNSPECIFIED: ICD-10-CM

## 2020-06-24 ENCOUNTER — TELEPHONE (OUTPATIENT)
Dept: CARDIOLOGY | Facility: HOSPITAL | Age: 70
End: 2020-06-24

## 2020-06-25 ENCOUNTER — CLINICAL SUPPORT (OUTPATIENT)
Dept: CARDIOLOGY | Facility: HOSPITAL | Age: 70
End: 2020-06-25
Attending: INTERNAL MEDICINE
Payer: MEDICARE

## 2020-06-25 DIAGNOSIS — I10 ESSENTIAL HYPERTENSION, MALIGNANT: ICD-10-CM

## 2020-06-25 DIAGNOSIS — I50.9 HEART FAILURE, UNSPECIFIED: ICD-10-CM

## 2020-06-25 DIAGNOSIS — I48.91 ATRIAL FIBRILLATION: ICD-10-CM

## 2020-06-25 PROCEDURE — 93923 UPR/LXTR ART STDY 3+ LVLS: CPT | Mod: 50

## 2020-07-01 LAB
LEFT ABI: 0.74
LEFT ARM BP: 92 MMHG
LEFT CALF BP: 92 MMHG
LEFT DORSALIS PEDIS: 72 MMHG
LEFT UPPER LEG BP: 92 MMHG
RIGHT ABI: 1.06
RIGHT ARM BP: 97 MMHG
RIGHT CALF BP: 102 MMHG
RIGHT DORSALIS PEDIS: 103 MMHG
RIGHT UPPER LEG BP: 98 MMHG

## 2020-10-22 DIAGNOSIS — I48.91 ATRIAL FIBRILLATION, UNSPECIFIED TYPE: Primary | ICD-10-CM

## 2020-11-11 ENCOUNTER — TELEPHONE (OUTPATIENT)
Dept: CARDIOLOGY | Facility: CLINIC | Age: 70
End: 2020-11-11

## 2020-11-11 NOTE — TELEPHONE ENCOUNTER
----- Message from Nunu Capone, Patient Care Assistant sent at 11/11/2020 11:42 AM CST -----  Regarding: advice  Contact: patient  Type: Needs Medical Advice  Who Called:  patient   Best Call Back Number: [phone] (home)   Additional Information: patient states he would like a callback regarding a bioscopy that he has schedule. Thanks!

## 2020-11-16 ENCOUNTER — TELEPHONE (OUTPATIENT)
Dept: CARDIOLOGY | Facility: CLINIC | Age: 70
End: 2020-11-16

## 2020-11-16 NOTE — TELEPHONE ENCOUNTER
----- Message from Rosa Melchor sent at 11/16/2020  3:07 PM CST -----  Regarding: clearance  Dr march office calling for Clearance to get off meds for prostate biopsy  Told them to fax request      [phone]  Fx: [phone]

## 2020-11-18 ENCOUNTER — TELEPHONE (OUTPATIENT)
Dept: CARDIOLOGY | Facility: CLINIC | Age: 70
End: 2020-11-18

## 2020-11-18 NOTE — TELEPHONE ENCOUNTER
----- Message from Lucía Carr MA sent at 11/17/2020  5:10 PM CST -----  Regarding: FW: appt    ----- Message -----  From: Rosa Melchor  Sent: 11/17/2020   2:38 PM CST  To: Heather Duarte Staff  Subject: appt                                             Calling about a future appt     [phone]

## 2021-01-05 ENCOUNTER — TELEPHONE (OUTPATIENT)
Dept: CARDIOLOGY | Facility: CLINIC | Age: 71
End: 2021-01-05

## 2021-03-23 RX ORDER — CLOPIDOGREL BISULFATE 75 MG/1
75 TABLET ORAL DAILY
Qty: 90 TABLET | Refills: 3 | Status: SHIPPED | OUTPATIENT
Start: 2021-03-23

## 2021-04-22 ENCOUNTER — TELEPHONE (OUTPATIENT)
Dept: CARDIOLOGY | Facility: CLINIC | Age: 71
End: 2021-04-22

## 2021-04-29 DIAGNOSIS — D86.2 SARCOIDOSIS OF LUNG WITH SARCOIDOSIS OF LYMPH NODES: Primary | ICD-10-CM

## 2021-05-04 ENCOUNTER — HOSPITAL ENCOUNTER (OUTPATIENT)
Dept: PULMONOLOGY | Facility: HOSPITAL | Age: 71
Discharge: HOME | End: 2021-05-04
Attending: INTERNAL MEDICINE
Payer: MEDICARE

## 2021-05-04 DIAGNOSIS — D86.2 SARCOIDOSIS OF LUNG WITH SARCOIDOSIS OF LYMPH NODES: ICD-10-CM

## 2021-05-04 PROCEDURE — 94727 GAS DIL/WSHOT DETER LNG VOL: CPT

## 2021-05-04 PROCEDURE — 94010 BREATHING CAPACITY TEST: CPT

## 2021-05-04 PROCEDURE — 94729 DIFFUSING CAPACITY: CPT

## 2021-05-10 ENCOUNTER — OFFICE VISIT (OUTPATIENT)
Dept: CARDIOLOGY | Facility: CLINIC | Age: 71
End: 2021-05-10
Payer: MEDICARE

## 2021-05-10 VITALS
HEART RATE: 89 BPM | OXYGEN SATURATION: 96 % | HEIGHT: 68 IN | RESPIRATION RATE: 16 BRPM | SYSTOLIC BLOOD PRESSURE: 106 MMHG | WEIGHT: 151 LBS | DIASTOLIC BLOOD PRESSURE: 76 MMHG | BODY MASS INDEX: 22.88 KG/M2

## 2021-05-10 DIAGNOSIS — I50.9 HEART FAILURE, UNSPECIFIED HF CHRONICITY, UNSPECIFIED HEART FAILURE TYPE: ICD-10-CM

## 2021-05-10 DIAGNOSIS — E78.5 DYSLIPIDEMIA: ICD-10-CM

## 2021-05-10 DIAGNOSIS — Z95.810 ICD (IMPLANTABLE CARDIOVERTER-DEFIBRILLATOR), DUAL, IN SITU: ICD-10-CM

## 2021-05-10 DIAGNOSIS — R79.89 ELEVATED TROPONIN: ICD-10-CM

## 2021-05-10 DIAGNOSIS — I50.23 ACUTE ON CHRONIC SYSTOLIC HEART FAILURE: ICD-10-CM

## 2021-05-10 DIAGNOSIS — I42.9 CARDIOMYOPATHY, UNSPECIFIED TYPE: ICD-10-CM

## 2021-05-10 DIAGNOSIS — D86.9 SARCOIDOSIS: ICD-10-CM

## 2021-05-10 DIAGNOSIS — I48.0 PAROXYSMAL ATRIAL FIBRILLATION: Primary | ICD-10-CM

## 2021-05-10 DIAGNOSIS — I10 HYPERTENSION, UNSPECIFIED TYPE: ICD-10-CM

## 2021-05-10 PROCEDURE — 3008F BODY MASS INDEX DOCD: CPT | Mod: CPTII,S$GLB,, | Performed by: INTERNAL MEDICINE

## 2021-05-10 PROCEDURE — 1159F MED LIST DOCD IN RCRD: CPT | Mod: S$GLB,,, | Performed by: INTERNAL MEDICINE

## 2021-05-10 PROCEDURE — 1101F PT FALLS ASSESS-DOCD LE1/YR: CPT | Mod: CPTII,S$GLB,, | Performed by: INTERNAL MEDICINE

## 2021-05-10 PROCEDURE — 1126F AMNT PAIN NOTED NONE PRSNT: CPT | Mod: S$GLB,,, | Performed by: INTERNAL MEDICINE

## 2021-05-10 PROCEDURE — 1101F PR PT FALLS ASSESS DOC 0-1 FALLS W/OUT INJ PAST YR: ICD-10-PCS | Mod: CPTII,S$GLB,, | Performed by: INTERNAL MEDICINE

## 2021-05-10 PROCEDURE — 93000 EKG 12-LEAD: ICD-10-PCS | Mod: S$GLB,,, | Performed by: INTERNAL MEDICINE

## 2021-05-10 PROCEDURE — 1126F PR PAIN SEVERITY QUANTIFIED, NO PAIN PRESENT: ICD-10-PCS | Mod: S$GLB,,, | Performed by: INTERNAL MEDICINE

## 2021-05-10 PROCEDURE — 1159F PR MEDICATION LIST DOCUMENTED IN MEDICAL RECORD: ICD-10-PCS | Mod: S$GLB,,, | Performed by: INTERNAL MEDICINE

## 2021-05-10 PROCEDURE — 99214 OFFICE O/P EST MOD 30 MIN: CPT | Mod: S$GLB,,, | Performed by: INTERNAL MEDICINE

## 2021-05-10 PROCEDURE — 3008F PR BODY MASS INDEX (BMI) DOCUMENTED: ICD-10-PCS | Mod: CPTII,S$GLB,, | Performed by: INTERNAL MEDICINE

## 2021-05-10 PROCEDURE — 99214 PR OFFICE/OUTPT VISIT, EST, LEVL IV, 30-39 MIN: ICD-10-PCS | Mod: S$GLB,,, | Performed by: INTERNAL MEDICINE

## 2021-05-10 PROCEDURE — 3288F PR FALLS RISK ASSESSMENT DOCUMENTED: ICD-10-PCS | Mod: CPTII,S$GLB,, | Performed by: INTERNAL MEDICINE

## 2021-05-10 PROCEDURE — 93000 ELECTROCARDIOGRAM COMPLETE: CPT | Mod: S$GLB,,, | Performed by: INTERNAL MEDICINE

## 2021-05-10 PROCEDURE — 3288F FALL RISK ASSESSMENT DOCD: CPT | Mod: CPTII,S$GLB,, | Performed by: INTERNAL MEDICINE

## 2021-07-27 DIAGNOSIS — I35.1 NONRHEUMATIC AORTIC (VALVE) INSUFFICIENCY: ICD-10-CM

## 2021-07-27 DIAGNOSIS — I35.1 AORTIC VALVE INSUFFICIENCY: Primary | ICD-10-CM

## 2021-08-11 DIAGNOSIS — D86.0 SARCOIDOSIS OF LUNG: Primary | ICD-10-CM

## 2021-09-07 ENCOUNTER — HOSPITAL ENCOUNTER (OUTPATIENT)
Dept: CARDIOLOGY | Facility: CLINIC | Age: 71
Discharge: HOME | End: 2021-09-07
Attending: INTERNAL MEDICINE
Payer: MEDICARE

## 2021-09-07 DIAGNOSIS — I48.0 PAROXYSMAL ATRIAL FIBRILLATION: ICD-10-CM

## 2021-09-07 PROCEDURE — 93295 DEV INTERROG REMOTE 1/2/MLT: CPT | Mod: S$GLB,,, | Performed by: INTERNAL MEDICINE

## 2021-09-07 PROCEDURE — 93296 CARDIAC DEVICE CHECK - REMOTE: ICD-10-PCS | Mod: S$GLB,,, | Performed by: INTERNAL MEDICINE

## 2021-09-07 PROCEDURE — 93295 CARDIAC DEVICE CHECK - REMOTE: ICD-10-PCS | Mod: S$GLB,,, | Performed by: INTERNAL MEDICINE

## 2021-09-07 PROCEDURE — 93296 REM INTERROG EVL PM/IDS: CPT | Mod: S$GLB,,, | Performed by: INTERNAL MEDICINE

## 2021-09-10 DIAGNOSIS — I48.0 PAROXYSMAL ATRIAL FIBRILLATION: Primary | ICD-10-CM

## 2021-09-24 LAB
IMPEDANCE RA LEAD (NATIVE): 304 OHMS
IMPEDANCE RA LEAD: 456 OHMS
P/R-WAVE RA LEAD (NATIVE): >20 MV
P/R-WAVE RA LEAD: 1.5 MV
THRESHOLD MS RA LEAD (NATIVE): 0.4 MS
THRESHOLD MS RA LEAD: 0.4 MS
THRESHOLD V RA LEAD (NATIVE): 1.88 V
THRESHOLD V RA LEAD: 0.75 V

## 2021-09-28 ENCOUNTER — HOSPITAL ENCOUNTER (OUTPATIENT)
Dept: RADIOLOGY | Facility: HOSPITAL | Age: 71
Discharge: HOME | End: 2021-09-28
Attending: INTERNAL MEDICINE
Payer: OTHER GOVERNMENT

## 2021-09-28 VITALS — BODY MASS INDEX: 22.73 KG/M2 | HEIGHT: 68 IN | WEIGHT: 150 LBS

## 2021-09-28 DIAGNOSIS — D86.0 SARCOIDOSIS OF LUNG: ICD-10-CM

## 2021-09-28 LAB — GLUCOSE SERPL-MCNC: 99 MG/DL (ref 70–110)

## 2021-09-28 PROCEDURE — 78815 PET IMAGE W/CT SKULL-THIGH: CPT | Mod: TC,PO

## 2021-09-28 PROCEDURE — 82962 GLUCOSE BLOOD TEST: CPT | Mod: PO

## 2021-10-21 ENCOUNTER — OFFICE VISIT (OUTPATIENT)
Dept: CARDIOLOGY | Facility: CLINIC | Age: 71
End: 2021-10-21
Payer: MEDICARE

## 2021-10-21 ENCOUNTER — TELEPHONE (OUTPATIENT)
Dept: CARDIOLOGY | Facility: CLINIC | Age: 71
End: 2021-10-21

## 2021-10-21 ENCOUNTER — HOSPITAL ENCOUNTER (OUTPATIENT)
Dept: CARDIOLOGY | Facility: CLINIC | Age: 71
Discharge: HOME | End: 2021-10-21
Attending: INTERNAL MEDICINE
Payer: MEDICARE

## 2021-10-21 VITALS
SYSTOLIC BLOOD PRESSURE: 114 MMHG | HEART RATE: 84 BPM | OXYGEN SATURATION: 95 % | DIASTOLIC BLOOD PRESSURE: 78 MMHG | WEIGHT: 158 LBS | BODY MASS INDEX: 23.95 KG/M2 | HEIGHT: 68 IN

## 2021-10-21 DIAGNOSIS — I48.0 PAROXYSMAL ATRIAL FIBRILLATION: ICD-10-CM

## 2021-10-21 DIAGNOSIS — I48.0 PAROXYSMAL ATRIAL FIBRILLATION: Primary | ICD-10-CM

## 2021-10-21 DIAGNOSIS — D86.85 SARCOID MYOCARDITIS: ICD-10-CM

## 2021-10-21 DIAGNOSIS — I50.20 SYSTOLIC HEART FAILURE, UNSPECIFIED HF CHRONICITY: ICD-10-CM

## 2021-10-21 DIAGNOSIS — Z95.810 ICD (IMPLANTABLE CARDIOVERTER-DEFIBRILLATOR), DUAL, IN SITU: ICD-10-CM

## 2021-10-21 DIAGNOSIS — I10 PRIMARY HYPERTENSION: ICD-10-CM

## 2021-10-21 DIAGNOSIS — E78.5 DYSLIPIDEMIA: ICD-10-CM

## 2021-10-21 PROCEDURE — 3074F SYST BP LT 130 MM HG: CPT | Mod: CPTII,S$GLB,, | Performed by: INTERNAL MEDICINE

## 2021-10-21 PROCEDURE — 99214 OFFICE O/P EST MOD 30 MIN: CPT | Mod: S$GLB,,, | Performed by: INTERNAL MEDICINE

## 2021-10-21 PROCEDURE — 3078F PR MOST RECENT DIASTOLIC BLOOD PRESSURE < 80 MM HG: ICD-10-PCS | Mod: CPTII,S$GLB,, | Performed by: INTERNAL MEDICINE

## 2021-10-21 PROCEDURE — 1160F PR REVIEW ALL MEDS BY PRESCRIBER/CLIN PHARMACIST DOCUMENTED: ICD-10-PCS | Mod: CPTII,S$GLB,, | Performed by: INTERNAL MEDICINE

## 2021-10-21 PROCEDURE — 1159F PR MEDICATION LIST DOCUMENTED IN MEDICAL RECORD: ICD-10-PCS | Mod: CPTII,S$GLB,, | Performed by: INTERNAL MEDICINE

## 2021-10-21 PROCEDURE — 3074F PR MOST RECENT SYSTOLIC BLOOD PRESSURE < 130 MM HG: ICD-10-PCS | Mod: CPTII,S$GLB,, | Performed by: INTERNAL MEDICINE

## 2021-10-21 PROCEDURE — 3008F PR BODY MASS INDEX (BMI) DOCUMENTED: ICD-10-PCS | Mod: CPTII,S$GLB,, | Performed by: INTERNAL MEDICINE

## 2021-10-21 PROCEDURE — 3008F BODY MASS INDEX DOCD: CPT | Mod: CPTII,S$GLB,, | Performed by: INTERNAL MEDICINE

## 2021-10-21 PROCEDURE — 1126F PR PAIN SEVERITY QUANTIFIED, NO PAIN PRESENT: ICD-10-PCS | Mod: CPTII,S$GLB,, | Performed by: INTERNAL MEDICINE

## 2021-10-21 PROCEDURE — 3078F DIAST BP <80 MM HG: CPT | Mod: CPTII,S$GLB,, | Performed by: INTERNAL MEDICINE

## 2021-10-21 PROCEDURE — 99214 PR OFFICE/OUTPT VISIT, EST, LEVL IV, 30-39 MIN: ICD-10-PCS | Mod: S$GLB,,, | Performed by: INTERNAL MEDICINE

## 2021-10-21 PROCEDURE — 1126F AMNT PAIN NOTED NONE PRSNT: CPT | Mod: CPTII,S$GLB,, | Performed by: INTERNAL MEDICINE

## 2021-10-21 PROCEDURE — 1160F RVW MEDS BY RX/DR IN RCRD: CPT | Mod: CPTII,S$GLB,, | Performed by: INTERNAL MEDICINE

## 2021-10-21 PROCEDURE — 1159F MED LIST DOCD IN RCRD: CPT | Mod: CPTII,S$GLB,, | Performed by: INTERNAL MEDICINE

## 2021-10-21 RX ORDER — NITROGLYCERIN 80 MG/1
1 PATCH TRANSDERMAL DAILY
COMMUNITY
End: 2022-10-20

## 2021-10-21 RX ORDER — POTASSIUM CHLORIDE 1.5 G/1.58G
20 POWDER, FOR SOLUTION ORAL ONCE
COMMUNITY

## 2021-10-21 RX ORDER — AMIODARONE HYDROCHLORIDE 200 MG/1
200 TABLET ORAL DAILY
Qty: 4 TABLET | Refills: 0 | Status: SHIPPED | OUTPATIENT
Start: 2021-10-21 | End: 2021-10-25

## 2021-10-21 RX ORDER — ACETAMINOPHEN 500 MG
500 TABLET ORAL EVERY 6 HOURS PRN
COMMUNITY

## 2021-10-21 RX ORDER — FERROUS GLUCONATE 324(38)MG
324 TABLET ORAL
COMMUNITY
End: 2022-12-08

## 2021-10-21 RX ORDER — ALBUTEROL SULFATE 90 UG/1
2 AEROSOL, METERED RESPIRATORY (INHALATION) EVERY 6 HOURS PRN
COMMUNITY
End: 2022-12-08

## 2021-10-21 RX ORDER — IPRATROPIUM BROMIDE AND ALBUTEROL 20; 100 UG/1; UG/1
1 SPRAY, METERED RESPIRATORY (INHALATION) EVERY 6 HOURS PRN
COMMUNITY
End: 2022-12-08

## 2021-10-21 RX ORDER — AMIODARONE HYDROCHLORIDE 200 MG/1
200 TABLET ORAL 2 TIMES DAILY
Qty: 60 TABLET | Refills: 11 | Status: SHIPPED | OUTPATIENT
Start: 2021-10-21 | End: 2021-11-23

## 2021-11-10 ENCOUNTER — LAB VISIT (OUTPATIENT)
Dept: LAB | Facility: HOSPITAL | Age: 71
End: 2021-11-10
Attending: INTERNAL MEDICINE
Payer: MEDICARE

## 2021-11-10 DIAGNOSIS — I48.0 PAROXYSMAL ATRIAL FIBRILLATION: ICD-10-CM

## 2021-11-10 DIAGNOSIS — Z95.810 ICD (IMPLANTABLE CARDIOVERTER-DEFIBRILLATOR), DUAL, IN SITU: ICD-10-CM

## 2021-11-10 LAB
ALBUMIN SERPL BCP-MCNC: 3.6 G/DL (ref 3.5–5.2)
ALP SERPL-CCNC: 56 U/L (ref 55–135)
ALT SERPL W/O P-5'-P-CCNC: 11 U/L (ref 10–44)
ANION GAP SERPL CALC-SCNC: 8 MMOL/L (ref 8–16)
AST SERPL-CCNC: 16 U/L (ref 10–40)
BILIRUB SERPL-MCNC: 1 MG/DL (ref 0.1–1)
BUN SERPL-MCNC: 20 MG/DL (ref 8–23)
CALCIUM SERPL-MCNC: 8.9 MG/DL (ref 8.7–10.5)
CHLORIDE SERPL-SCNC: 104 MMOL/L (ref 95–110)
CO2 SERPL-SCNC: 25 MMOL/L (ref 23–29)
CREAT SERPL-MCNC: 1.1 MG/DL (ref 0.5–1.4)
ERYTHROCYTE [DISTWIDTH] IN BLOOD BY AUTOMATED COUNT: 14.5 % (ref 11.5–14.5)
EST. GFR  (AFRICAN AMERICAN): >60 ML/MIN/1.73 M^2
EST. GFR  (NON AFRICAN AMERICAN): >60 ML/MIN/1.73 M^2
GLUCOSE SERPL-MCNC: 101 MG/DL (ref 70–110)
HCT VFR BLD AUTO: 38.9 % (ref 40–54)
HGB BLD-MCNC: 11.9 G/DL (ref 14–18)
MCH RBC QN AUTO: 23.9 PG (ref 27–31)
MCHC RBC AUTO-ENTMCNC: 30.6 G/DL (ref 32–36)
MCV RBC AUTO: 78 FL (ref 82–98)
PLATELET # BLD AUTO: 206 K/UL (ref 150–450)
PMV BLD AUTO: 11.6 FL (ref 9.2–12.9)
POTASSIUM SERPL-SCNC: 4.4 MMOL/L (ref 3.5–5.1)
PROT SERPL-MCNC: 7.4 G/DL (ref 6–8.4)
RBC # BLD AUTO: 4.98 M/UL (ref 4.6–6.2)
SODIUM SERPL-SCNC: 137 MMOL/L (ref 136–145)
T4 FREE SERPL-MCNC: 0.78 NG/DL (ref 0.71–1.51)
TSH SERPL DL<=0.005 MIU/L-ACNC: 9.77 UIU/ML (ref 0.34–5.6)
WBC # BLD AUTO: 3.39 K/UL (ref 3.9–12.7)

## 2021-11-10 PROCEDURE — 84439 ASSAY OF FREE THYROXINE: CPT | Performed by: INTERNAL MEDICINE

## 2021-11-10 PROCEDURE — 36415 COLL VENOUS BLD VENIPUNCTURE: CPT | Performed by: INTERNAL MEDICINE

## 2021-11-10 PROCEDURE — 84443 ASSAY THYROID STIM HORMONE: CPT | Performed by: INTERNAL MEDICINE

## 2021-11-10 PROCEDURE — 85027 COMPLETE CBC AUTOMATED: CPT | Performed by: INTERNAL MEDICINE

## 2021-11-10 PROCEDURE — 80053 COMPREHEN METABOLIC PANEL: CPT | Performed by: INTERNAL MEDICINE

## 2021-11-23 ENCOUNTER — CLINICAL SUPPORT (OUTPATIENT)
Dept: CARDIOLOGY | Facility: CLINIC | Age: 71
End: 2021-11-23
Payer: MEDICARE

## 2021-11-23 DIAGNOSIS — I48.0 PAROXYSMAL ATRIAL FIBRILLATION: Primary | ICD-10-CM

## 2021-11-23 DIAGNOSIS — R94.31 NONSPECIFIC ABNORMAL ELECTROCARDIOGRAM (ECG) (EKG): ICD-10-CM

## 2021-11-23 PROCEDURE — 93000 EKG 12-LEAD: ICD-10-PCS | Mod: S$GLB,,, | Performed by: INTERNAL MEDICINE

## 2021-11-23 PROCEDURE — 93000 ELECTROCARDIOGRAM COMPLETE: CPT | Mod: S$GLB,,, | Performed by: INTERNAL MEDICINE

## 2021-11-23 RX ORDER — AMIODARONE HYDROCHLORIDE 200 MG/1
200 TABLET ORAL DAILY
Qty: 30 TABLET | Refills: 11 | Status: SHIPPED | OUTPATIENT
Start: 2021-11-23 | End: 2023-03-28

## 2022-02-28 ENCOUNTER — TELEPHONE (OUTPATIENT)
Dept: CARDIOLOGY | Facility: CLINIC | Age: 72
End: 2022-02-28
Payer: MEDICARE

## 2022-02-28 NOTE — TELEPHONE ENCOUNTER
----- Message from Luis Carlos Carr sent at 2/28/2022 10:01 AM CST -----  Regarding: medical questions  Contact: patient  Patient want to speak with a nurse regarding device blinking yellow and green, need some advice please call back at [phone] (home)     Case number [id number]

## 2022-03-29 DIAGNOSIS — I48.0 PAROXYSMAL ATRIAL FIBRILLATION: Primary | ICD-10-CM

## 2022-07-12 NOTE — TELEPHONE ENCOUNTER
Fax received for refill of Flonase Nasal [place] from [facility].  No record of this medication in EMR.

## 2022-08-08 NOTE — TELEPHONE ENCOUNTER
NUTRITION ASSESSMENT    Reason for visit: trying to lose weight, obesity  - reports craving foods, sweets  - eating healthy per pt  - drinking more water  - exercising    Food Recall:  Wake up 630am + drinks glass of water  8am:   - eggs + broccoli/cauliflower/carrots on the side + 2 slices whole grain or whole wheat bread  - eggs with onion/tomato/jalapeno  - cereal (special k almond) + unsweetened almond milk  9am: coffee with silk sweetened almond milk creamer  12-1pm:  - sandwich - lettuce/tomato/cheese/onion + bologna  - soup - olive oil, onion, tomatoes blended, noodles, cilantro  - prepacked chicken salad with ww bread + lettuce  4-5pm:   - bowl of cereal  - sandwich  - 3-4 tacos with ground turkey + sour cream + cheese + lettuce    Snack between lunch/dinner: apple sauce, atkins reeses (9gcho,6gsugar alcoho, 1g pro, 6g fat), apple, peach    *9 year old son - does not effect how pt eats    Drinks: mostly water, coffee 2x/wk,   - soda at holiday  - no juice/green juice/smoothie  - no tea  - alcohol for [place] or [place]    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  Physical Activity:  - walks 30 minutes 2-3x/day with nephew  - gym 3-4x/wk -- light weights (injured arm lifting heavy weight)  Medications: OTC allergy medicine  Supplements: none  Allergies to food: none   Noticed weight change:  - reports gained 20-25# in the last few months      Cooking: patient  Take-out: 1x/wk  - jabier diaz    Sleep: 6 hours  - feels a little rested when gets up -- not tired    Stress: 5/10  - stress relief: workout, walk with nephew  - was taking care of mother who broke her ankle -- was very stressful, not taking care of mother now ([id number]) - stress now better      Nutrition ideas:  1. Continue 3 meals and 1-2 snacks  - eat slowly, aim for 20-30 minute meals  - assess how you feel as you eat -- am I hungry? Am I full? Am I satisfied? Am I too full? Am I bored? If you are hungry, important to eat! If you are  Trevor Madrid   [id number]   9/18/2019         Spoke with: pt     Received Medications?:yes    Follow Up Appt?:yes    Cardio Pulmonary Rehab Appt?:not applicable    Comments: pt states he is unable to attend cardiac rehab at this due due to no ride    Sydni Shepherd RN     full, what else can you do instead (and save leftovers)?  2. Continue drinking water throughout the day - stay hydrated  3. Breakfast ideas:  - eggs with side of broccoli/cauliflower/carrots + 1 slice of toast  - eggs with tomato/onion/jalapeno + slice of toast + piece of fruit  - whole grain english muffin with peanut butter *top with sliced banana or frozen/fresh berries  - 2% greek yogurt with a piece of fruit and a small handful of chopped nuts/seeds *top with a little granola  (aim for <12 grams of sugar and at least 10 grams of protein)    *Cereal once in a while -- try [employer] protein cereal    4. Lunch + dinner: 1/2-3/4 plate vegetables, 1/4 plate lean protein, 1/4 plate carbohydrate (smaller portion) -- Discussed plate method  *1/2-3/4 plate Vegetables/Salad: lettuce, broccoli, cauliflower, carrots, onion, tomato, spinach, zucchini, onion, asparagus, cabbage, pepper, etc.   -- salad packs  -- frozen veggies  *1/4 plate Lean protein: Chicken, turkey, eggs, tofu, lean beef, nuts/seeds, natural peanut butter, greek yogurt  *1/4 plate Carbohydrate/starch (aim for about the size of your closed fist at each meal): quinoa, beans, rice, potato/sweet potato, pasta, whole wheat bread, noodles, tortilla, corn (2-3), peas, yam, yucca, plantain      How can you enhance your meal? What can you ADD to make it an even better option?  EX. 2 tacos with beans, ground chicken/turkey, fajita veggies, lettuce/tomato/onion, cilantro, guac  EX. Pizza with a big side salad      5. Snack ideas:  - piece of fruit with a small handful of nuts/seeds or a cheese stick  - plain greek yogurt with a piece of fruit (*[employer] less sugar flavored greek yogurt)  - apple with peanut butter  - popcorn with a cheese stick  - hummus or guacamole with cucumber, carrots, pepper, celery, whole wheat crackers/tortilla chips *try to include a veggie with the chips or crackers  - roasted chickpeas  https://www.[employer]/how-to-make-crispy-roasted-chickpeas-in-the-oven-cooking-lessons-from-the-[employer]-219753  - zucchini chips  - O[employer] chocolate chips (with greek yogurt or a small handful of nuts/seeds)    *OK to have a treat, enjoy it, and then can move on! No good vs. Bad food      Continue with walking and light weights!      Education  Discussed/Instructed on the Following:  Sources of Carbohydrates in Diet  Sources of Fat in Diet  Sources of Protein in Diet  Sources of fiber  Activity/Exercise  Increasing Activity in Daily Routine    Handouts/Resources Provided/Introduced:  idea above, discussed myplate planner    Nutrition Goals  1. Balance meals using myplate planner -- including all food groups    Nutrition Assessment  Support Structure Present: yes   Patient Understands Disease State: yes     Current Activity Level:  Lightly Active    Primary Meal Preparer: patient    Barriers to Reaching Goals:   None    Treatment Plan  Goals:  Increase Normal Activities  Continue Working on Meal Plan  Work on Ways to Reduce Stress    Spent 60 minutes with pt.  Megan Shepherd RD

## 2022-08-19 ENCOUNTER — TELEPHONE (OUTPATIENT)
Dept: CARDIOLOGY | Facility: CLINIC | Age: 72
End: 2022-08-19
Payer: MEDICARE

## 2022-08-19 NOTE — TELEPHONE ENCOUNTER
----- Message from Kenya Huston sent at 8/19/2022 11:33 AM CDT -----  Type: Needs Medical Advice  Who Called: Pt   Symptoms (please be specific):   How long has patient had these symptoms:    Pharmacy name and phone #:    Best Call Back Number: [phone]  Additional Information: Pt requesting a call back to reschedule his missed appt.

## 2022-10-03 ENCOUNTER — TELEPHONE (OUTPATIENT)
Dept: CARDIOLOGY | Facility: CLINIC | Age: 72
End: 2022-10-03
Payer: MEDICARE

## 2022-10-20 ENCOUNTER — OFFICE VISIT (OUTPATIENT)
Dept: CARDIOLOGY | Facility: CLINIC | Age: 72
End: 2022-10-20
Payer: MEDICARE

## 2022-10-20 ENCOUNTER — LAB VISIT (OUTPATIENT)
Dept: LAB | Facility: HOSPITAL | Age: 72
End: 2022-10-20
Attending: INTERNAL MEDICINE
Payer: MEDICARE

## 2022-10-20 VITALS
BODY MASS INDEX: 22.88 KG/M2 | SYSTOLIC BLOOD PRESSURE: 120 MMHG | HEIGHT: 68 IN | WEIGHT: 151 LBS | OXYGEN SATURATION: 99 % | DIASTOLIC BLOOD PRESSURE: 78 MMHG | RESPIRATION RATE: 16 BRPM | HEART RATE: 81 BPM

## 2022-10-20 DIAGNOSIS — I48.0 PAROXYSMAL ATRIAL FIBRILLATION: ICD-10-CM

## 2022-10-20 DIAGNOSIS — I50.20 SYSTOLIC HEART FAILURE, UNSPECIFIED HF CHRONICITY: ICD-10-CM

## 2022-10-20 DIAGNOSIS — E78.5 DYSLIPIDEMIA: ICD-10-CM

## 2022-10-20 DIAGNOSIS — I10 PRIMARY HYPERTENSION: ICD-10-CM

## 2022-10-20 DIAGNOSIS — D86.85 SARCOID MYOCARDITIS: ICD-10-CM

## 2022-10-20 DIAGNOSIS — Z95.810 ICD (IMPLANTABLE CARDIOVERTER-DEFIBRILLATOR), DUAL, IN SITU: ICD-10-CM

## 2022-10-20 LAB
ALBUMIN SERPL BCP-MCNC: 3.5 G/DL (ref 3.5–5.2)
ALP SERPL-CCNC: 58 U/L (ref 55–135)
ALT SERPL W/O P-5'-P-CCNC: 12 U/L (ref 10–44)
AST SERPL-CCNC: 17 U/L (ref 10–40)
BILIRUB DIRECT SERPL-MCNC: <0.1 MG/DL (ref 0.1–0.3)
BILIRUB SERPL-MCNC: 0.7 MG/DL (ref 0.1–1)
CHOLEST SERPL-MCNC: 180 MG/DL (ref 120–199)
CHOLEST/HDLC SERPL: 4.1 {RATIO} (ref 2–5)
HDLC SERPL-MCNC: 44 MG/DL (ref 40–75)
HDLC SERPL: 24.4 % (ref 20–50)
LDLC SERPL CALC-MCNC: 125.2 MG/DL (ref 63–159)
NONHDLC SERPL-MCNC: 136 MG/DL
PROT SERPL-MCNC: 7.6 G/DL (ref 6–8.4)
T4 FREE SERPL-MCNC: 0.71 NG/DL (ref 0.71–1.51)
TRIGL SERPL-MCNC: 54 MG/DL (ref 30–150)
TSH SERPL DL<=0.005 MIU/L-ACNC: 11.34 UIU/ML (ref 0.34–5.6)

## 2022-10-20 PROCEDURE — 99214 PR OFFICE/OUTPT VISIT, EST, LEVL IV, 30-39 MIN: ICD-10-PCS | Mod: S$GLB,,, | Performed by: INTERNAL MEDICINE

## 2022-10-20 PROCEDURE — 3288F PR FALLS RISK ASSESSMENT DOCUMENTED: ICD-10-PCS | Mod: CPTII,S$GLB,, | Performed by: INTERNAL MEDICINE

## 2022-10-20 PROCEDURE — 1101F PT FALLS ASSESS-DOCD LE1/YR: CPT | Mod: CPTII,S$GLB,, | Performed by: INTERNAL MEDICINE

## 2022-10-20 PROCEDURE — 1160F RVW MEDS BY RX/DR IN RCRD: CPT | Mod: CPTII,S$GLB,, | Performed by: INTERNAL MEDICINE

## 2022-10-20 PROCEDURE — 80061 LIPID PANEL: CPT | Performed by: INTERNAL MEDICINE

## 2022-10-20 PROCEDURE — 84443 ASSAY THYROID STIM HORMONE: CPT | Performed by: INTERNAL MEDICINE

## 2022-10-20 PROCEDURE — 1159F PR MEDICATION LIST DOCUMENTED IN MEDICAL RECORD: ICD-10-PCS | Mod: CPTII,S$GLB,, | Performed by: INTERNAL MEDICINE

## 2022-10-20 PROCEDURE — 3078F DIAST BP <80 MM HG: CPT | Mod: CPTII,S$GLB,, | Performed by: INTERNAL MEDICINE

## 2022-10-20 PROCEDURE — 1159F MED LIST DOCD IN RCRD: CPT | Mod: CPTII,S$GLB,, | Performed by: INTERNAL MEDICINE

## 2022-10-20 PROCEDURE — 1160F PR REVIEW ALL MEDS BY PRESCRIBER/CLIN PHARMACIST DOCUMENTED: ICD-10-PCS | Mod: CPTII,S$GLB,, | Performed by: INTERNAL MEDICINE

## 2022-10-20 PROCEDURE — 99214 OFFICE O/P EST MOD 30 MIN: CPT | Mod: S$GLB,,, | Performed by: INTERNAL MEDICINE

## 2022-10-20 PROCEDURE — 84439 ASSAY OF FREE THYROXINE: CPT | Performed by: INTERNAL MEDICINE

## 2022-10-20 PROCEDURE — 80076 HEPATIC FUNCTION PANEL: CPT | Performed by: INTERNAL MEDICINE

## 2022-10-20 PROCEDURE — 36415 COLL VENOUS BLD VENIPUNCTURE: CPT | Performed by: INTERNAL MEDICINE

## 2022-10-20 PROCEDURE — 3074F PR MOST RECENT SYSTOLIC BLOOD PRESSURE < 130 MM HG: ICD-10-PCS | Mod: CPTII,S$GLB,, | Performed by: INTERNAL MEDICINE

## 2022-10-20 PROCEDURE — 1101F PR PT FALLS ASSESS DOC 0-1 FALLS W/OUT INJ PAST YR: ICD-10-PCS | Mod: CPTII,S$GLB,, | Performed by: INTERNAL MEDICINE

## 2022-10-20 PROCEDURE — 3078F PR MOST RECENT DIASTOLIC BLOOD PRESSURE < 80 MM HG: ICD-10-PCS | Mod: CPTII,S$GLB,, | Performed by: INTERNAL MEDICINE

## 2022-10-20 PROCEDURE — 1126F PR PAIN SEVERITY QUANTIFIED, NO PAIN PRESENT: ICD-10-PCS | Mod: CPTII,S$GLB,, | Performed by: INTERNAL MEDICINE

## 2022-10-20 PROCEDURE — 3288F FALL RISK ASSESSMENT DOCD: CPT | Mod: CPTII,S$GLB,, | Performed by: INTERNAL MEDICINE

## 2022-10-20 PROCEDURE — 3074F SYST BP LT 130 MM HG: CPT | Mod: CPTII,S$GLB,, | Performed by: INTERNAL MEDICINE

## 2022-10-20 PROCEDURE — 1126F AMNT PAIN NOTED NONE PRSNT: CPT | Mod: CPTII,S$GLB,, | Performed by: INTERNAL MEDICINE

## 2022-10-20 NOTE — ASSESSMENT & PLAN NOTE
Her well compensated at this present time to this is to include losartan 25 mg, daily, metoprolol succinate 25 mg daily, Aldactone 25 mg a day Lasix 20 mg daily maintain a salt restricted diet.  And fluid restriction to about 2 L.

## 2022-10-20 NOTE — ASSESSMENT & PLAN NOTE
Maintain regular rhythm on amiodarone at 200 mg a day he is on Eliquis 2.5 mg p.o. b.i.d. I will advise him to stop on aspirin maintain on Plavix 75 mg daily

## 2022-10-20 NOTE — PROGRESS NOTES
Subjective:    Patient ID:  Trevor Madrid is a 72 y.o. male patient here for evaluation Atrial Fibrillation      History of Present Illness:     A 70-year-old gentleman with history of atrial fibrillation cardiomyopathy compensated congestive heart failure history of sarcoidosis here for follow-up evaluation.  About a couple months ago he is noted to have some alarms on his bedside monitoring for a system for his ICD however transmission September did not show any dysrhythmias and no shocks treatments.  Arm is doing good otherwise had no angina symptoms no arm neck or jaw pain and no edema noted in the lower extremities.    No cough or congestion        Review of patient's allergies indicates:  No Known Allergies    Past Medical History:   Diagnosis Date    A-fib     AICD (automatic cardioverter/defibrillator) present     Arthritis     Atrial fibrillation     Cancer     Cardiomyopathy     COPD (chronic obstructive pulmonary disease)     Heart failure     Histoplasmosis     Hypertension     Sarcoidosis     Sleep apnea     Thyroid disease      Past Surgical History:   Procedure Laterality Date    CARDIAC CATHETERIZATION      CARDIAC DEFIBRILLATOR PLACEMENT Left 2020    CORONARY ANGIOGRAPHY Left 2019    Procedure: CATHETERIZATION, HEART, LEFT (RIGHT RADIAL);  Surgeon: Gerald Romero MD;  Location: [facility] CATH/EP LAB;  Service: Cardiology;  Laterality: Left;    CORONARY STENT PLACEMENT      INSERTION OF BIVENTRICULAR IMPLANTABLE CARDIOVERTER-DEFIBRILLATOR (ICD)      TOTAL THYROIDECTOMY       Social History     Tobacco Use    Smoking status: Former     Types: Cigarettes     Quit date: 2014     Years since quittin.1    Smokeless tobacco: Former   Substance Use Topics    Alcohol use: Not Currently    Drug use: Never        Review of Systems:    As noted in HPI in addition      REVIEW OF SYSTEMS  CARDIOVASCULAR: No recent chest pain, palpitations, arm, neck, or jaw pain  RESPIRATORY: No recent fever,  cough chills, SOB or congestion  : No blood in the urine  GI: No Nausea, vomiting, constipation, diarrhea, blood, or reflux.  MUSCULOSKELETAL: No myalgias  NEURO: No lightheadedness or dizziness  EYES: No Double vision, blurry, vision or headache              Objective        Vitals:    10/20/22 0929   BP: 120/78   Pulse: 81   Resp: 16       LIPIDS - LAST 2   No results found for: CHOL, HDL, LDLCALC, TRIG, CHOLHDL    CBC - LAST 2  Lab Results   Component Value Date    WBC 3.39 (L) 11/10/2021    WBC 2.96 (L) 05/26/2020    RBC 4.98 11/10/2021    RBC 4.92 05/26/2020    HGB 11.9 (L) 11/10/2021    HGB 11.1 (L) 05/26/2020    HCT 38.9 (L) 11/10/2021    HCT 36.7 (L) 05/26/2020    MCV 78 (L) 11/10/2021    MCV 75 (L) 05/26/2020    MCH 23.9 (L) 11/10/2021    MCH 22.6 (L) 05/26/2020    MCHC 30.6 (L) 11/10/2021    MCHC 30.2 (L) 05/26/2020    RDW 14.5 11/10/2021    RDW 14.1 05/26/2020     11/10/2021     05/26/2020    MPV 11.6 11/10/2021    MPV 11.7 05/26/2020    GRAN 2.2 05/26/2020    GRAN 73.3 (H) 05/26/2020    LYMPH 0.3 (L) 05/26/2020    LYMPH 10.5 (L) 05/26/2020    MONO 0.4 05/26/2020    MONO 11.8 05/26/2020    BASO 0.04 05/26/2020    BASO 0.04 05/25/2020    NRBC 0 05/26/2020    NRBC 0 05/25/2020       CHEMISTRY & LIVER FUNCTION - LAST 2  Lab Results   Component Value Date     11/10/2021     05/26/2020    K 4.4 11/10/2021    K 4.0 05/26/2020     11/10/2021     05/26/2020    CO2 25 11/10/2021    CO2 27 05/26/2020    ANIONGAP 8 11/10/2021    ANIONGAP 13 05/26/2020    BUN 20 11/10/2021    BUN 15 05/26/2020    CREATININE 1.1 11/10/2021    CREATININE 0.9 05/26/2020     11/10/2021    GLU 93 05/26/2020    CALCIUM 8.9 11/10/2021    CALCIUM 8.2 (L) 05/26/2020    PH 7.422 05/24/2020    MG 2.0 05/26/2020    MG 1.8 05/25/2020    ALBUMIN 3.6 11/10/2021    ALBUMIN 2.9 (L) 05/24/2020    PROT 7.4 11/10/2021    PROT 7.1 05/24/2020    ALKPHOS 56 11/10/2021    ALKPHOS 55 05/24/2020    ALT 11  11/10/2021    ALT 17 05/24/2020    AST 16 11/10/2021    AST 22 05/24/2020    BILITOT 1.0 11/10/2021    BILITOT 0.8 05/24/2020        CARDIAC PROFILE - LAST 2  Lab Results   Component Value Date    BNP 1,108 (H) 05/24/2020     (H) 05/24/2020     (H) 05/24/2020    TROPONINI 0.060 (HH) 05/25/2020    TROPONINI 0.061 (HH) 05/25/2020        COAGULATION - LAST 2  Lab Results   Component Value Date    LABPT 13.7 01/27/2020    LABPT 14.5 (H) 09/12/2019    INR 1.1 01/27/2020    INR 1.2 09/12/2019    APTT 36.0 (H) 01/27/2020    APTT 36.6 (H) 09/12/2019       ENDOCRINE & PSA - LAST 2  Lab Results   Component Value Date    TSH 9.770 (H) 11/10/2021    PROCAL 0.12 05/24/2020        ECHOCARDIOGRAM RESULTS  No results found for this or any previous visit.      CURRENT/PREVIOUS VISIT EKG  Results for orders placed or performed in visit on 11/23/21   IN OFFICE EKG 12-LEAD (to [place])    Collection Time: 11/23/21  9:52 AM    Narrative    Test Reason : I48.0,R94.31,    Vent. Rate : 067 BPM     Atrial Rate : 067 BPM     P-R Int : 200 ms          QRS Dur : 108 ms      QT Int : 446 ms       P-R-T Axes : 022 027 236 degrees     QTc Int : 471 ms    Atrial-paced rhythm  LVH with repolarization abnormality  Abnormal ECG  When compared with ECG of 10-MAY-2021 15:26,  T wave inversion more evident in Inferior leads  Confirmed by Nicho West MD ([id number]) on 12/5/2021 10:09:55 PM    Referred By: [email or web address]   SELF           Confirmed By:Nicho West MD     No valid procedures specified.   No results found for this or any previous visit.    No valid procedures specified.    PHYSICAL EXAM  CONSTITUTIONAL: Well built, well nourished in no apparent distress  NECK: no carotid bruit, no JVD  LUNGS: CTA  CHEST WALL: no tenderness, ICD site in the left chest wall are he is healing well.  HEART: regular rate and rhythm, S1, S2 normal, no murmur, click, rub or gallop   ABDOMEN: soft, non-tender; bowel sounds normal; no masses,  no  organomegaly  EXTREMITIES: Extremities normal, no edema, no calf tenderness noted  NEURO: AAO X 3    I HAVE REVIEWED :    The vital signs, nurses notes, and all the pertinent radiology and labs.        Current Outpatient Medications   Medication Instructions    acetaminophen (TYLENOL) 500 mg, Oral, Every 6 hours PRN    albuterol (PROVENTIL HFA) 90 mcg/actuation inhaler 2 puffs, Inhalation, Every 6 hours PRN, Rescue     amiodarone (PACERONE) 200 mg, Oral, Daily    apixaban (ELIQUIS) 2.5 mg Tab Oral, 2 times daily    aspirin (ECOTRIN) 81 mg, Oral, Daily    clopidogreL (PLAVIX) 75 mg, Oral, Daily    cyanocobalamin (VITAMIN B-12) 1,000 mcg, Oral, Daily    ferrous gluconate (FERGON) 324 mg, Oral, With breakfast    finasteride (PROSCAR) 5 mg, Oral, Daily    folic acid (FOLVITE) 1 mg, Oral, Daily    furosemide (LASIX) 20 mg, Oral, Daily    ipratropium-albuteroL (COMBIVENT RESPIMAT)  mcg/actuation inhaler 1 puff, Inhalation, Every 6 hours PRN, Rescue     leflunomide (ARAVA) 20 mg, Oral, Daily    levothyroxine (SYNTHROID) 100 mcg, Oral, Daily    loratadine (CLARITIN) 10 mg, Oral, Daily    losartan (COZAAR) 25 mg, Oral, Daily    methotrexate 2.5 MG Tab Oral, Every 7 days    metoprolol succinate (TOPROL-XL) 25 mg, Oral, Daily    pantoprazole (PROTONIX) 40 mg, Oral, Daily    potassium chloride (KLOR-CON) 20 mEq Pack 20 mEq, Oral, Once    prednisoLONE acetate (PRED FORTE) 1 % DrpS 1 drop, 4 times daily PRN    spironolactone (ALDACTONE) 25 MG tablet TAKE 1 TABLET EVERY DAY    thiamine 100 mg, Oral, Daily          Assessment & Plan     Cardiomyopathy  His fairly well compensated at this time continue on present therapy to include losartan at 25 mg daily, metoprolol Aldactone Lasix and amiodarone.    Heart failure  Her well compensated at this present time to this is to include losartan 25 mg, daily, metoprolol succinate 25 mg daily, Aldactone 25 mg a day Lasix 20 mg daily maintain a salt restricted diet.  And fluid  restriction to about 2 L.    Paroxysmal atrial fibrillation  Maintain regular rhythm on amiodarone at 200 mg a day he is on Eliquis 2.5 mg p.o. b.i.d. I will advise him to stop on aspirin maintain on Plavix 75 mg daily    Hypertension  Blood pressure is stable at 1 20/78 mm Hg on the present regimen continue the same    Dyslipidemia  He is on low-fat diet and appears relatively stable his due for repeat lipid evaluation.    ICD (implantable cardioverter-defibrillator), dual, in situ  No discharges from the ICD noted.  Continue on amiodarone on present therapy    For safety monitoring for amiodarone recheck liver profile, TSH and chest x-ray and also recheck lipid levels.      No follow-ups on file.

## 2022-10-20 NOTE — ASSESSMENT & PLAN NOTE
His fairly well compensated at this time continue on present therapy to include losartan at 25 mg daily, metoprolol Aldactone Lasix and amiodarone.

## 2022-10-28 RX ORDER — LEVOTHYROXINE SODIUM 112 UG/1
112 TABLET ORAL
Qty: 90 TABLET | Refills: 3 | Status: SHIPPED | OUTPATIENT
Start: 2022-10-28

## 2022-10-28 NOTE — TELEPHONE ENCOUNTER
----- Message from Kimberly Nichole NP sent at 10/25/2022  8:37 AM CDT -----  Increase Levothyroxine to 112 mcg daily take on empty stomach an hour before any other medication    Also is he on eliquis plavix and asa?    Need to drop asa if so

## 2022-10-28 NOTE — TELEPHONE ENCOUNTER
Spoke with pt, states he has not been taking asa, medication taken off list, also gave recommendation below. Pt confirmed and verbalized understanding.

## 2022-12-08 ENCOUNTER — HOSPITAL ENCOUNTER (OUTPATIENT)
Facility: HOSPITAL | Age: 72
Discharge: HOME | End: 2022-12-09
Attending: EMERGENCY MEDICINE | Admitting: INTERNAL MEDICINE
Payer: MEDICARE

## 2022-12-08 DIAGNOSIS — I50.23 ACUTE ON CHRONIC SYSTOLIC CONGESTIVE HEART FAILURE: Primary | ICD-10-CM

## 2022-12-08 DIAGNOSIS — R06.02 SOB (SHORTNESS OF BREATH): ICD-10-CM

## 2022-12-08 DIAGNOSIS — R06.02 SHORTNESS OF BREATH: ICD-10-CM

## 2022-12-08 DIAGNOSIS — R07.9 CHEST PAIN: ICD-10-CM

## 2022-12-08 DIAGNOSIS — I50.9 HEART FAILURE: ICD-10-CM

## 2022-12-08 PROBLEM — E89.0 H/O THYROIDECTOMY: Status: ACTIVE | Noted: 2022-12-08

## 2022-12-08 PROBLEM — Z98.890 H/O THYROIDECTOMY: Status: ACTIVE | Noted: 2022-12-08

## 2022-12-08 PROBLEM — Z90.89 H/O THYROIDECTOMY: Status: ACTIVE | Noted: 2022-12-08

## 2022-12-08 PROBLEM — E07.9 THYROID DISEASE: Status: ACTIVE | Noted: 2022-12-08

## 2022-12-08 LAB
ALBUMIN SERPL BCP-MCNC: 3.2 G/DL (ref 3.5–5.2)
ALP SERPL-CCNC: 67 U/L (ref 55–135)
ALT SERPL W/O P-5'-P-CCNC: 7 U/L (ref 10–44)
ANION GAP SERPL CALC-SCNC: 6 MMOL/L (ref 8–16)
APTT PPP: 41.7 SEC (ref 23.3–35.1)
AST SERPL-CCNC: 15 U/L (ref 10–40)
BASOPHILS # BLD AUTO: 0.03 K/UL (ref 0–0.2)
BASOPHILS NFR BLD: 0.9 % (ref 0–1.9)
BILIRUB SERPL-MCNC: 0.9 MG/DL (ref 0.1–1)
BILIRUB UR QL STRIP: NEGATIVE
BNP SERPL-MCNC: 913 PG/ML (ref 0–99)
BUN SERPL-MCNC: 12 MG/DL (ref 8–23)
CALCIUM SERPL-MCNC: 8.5 MG/DL (ref 8.7–10.5)
CHLORIDE SERPL-SCNC: 103 MMOL/L (ref 95–110)
CLARITY UR: CLEAR
CO2 SERPL-SCNC: 26 MMOL/L (ref 23–29)
COLOR UR: YELLOW
CREAT SERPL-MCNC: 0.9 MG/DL (ref 0.5–1.4)
DIFFERENTIAL METHOD: ABNORMAL
EOSINOPHIL # BLD AUTO: 0.1 K/UL (ref 0–0.5)
EOSINOPHIL NFR BLD: 2.7 % (ref 0–8)
ERYTHROCYTE [DISTWIDTH] IN BLOOD BY AUTOMATED COUNT: 14.9 % (ref 11.5–14.5)
EST. GFR  (NO RACE VARIABLE): >60 ML/MIN/1.73 M^2
GLUCOSE SERPL-MCNC: 85 MG/DL (ref 70–110)
GLUCOSE UR QL STRIP: NEGATIVE
HCT VFR BLD AUTO: 38.4 % (ref 40–54)
HGB BLD-MCNC: 11.6 G/DL (ref 14–18)
HGB UR QL STRIP: NEGATIVE
IMM GRANULOCYTES # BLD AUTO: 0.01 K/UL (ref 0–0.04)
IMM GRANULOCYTES NFR BLD AUTO: 0.3 % (ref 0–0.5)
INR PPP: 1.3
KETONES UR QL STRIP: NEGATIVE
LEUKOCYTE ESTERASE UR QL STRIP: NEGATIVE
LYMPHOCYTES # BLD AUTO: 0.5 K/UL (ref 1–4.8)
LYMPHOCYTES NFR BLD: 16.3 % (ref 18–48)
MAGNESIUM SERPL-MCNC: 2.1 MG/DL (ref 1.6–2.6)
MCH RBC QN AUTO: 23.7 PG (ref 27–31)
MCHC RBC AUTO-ENTMCNC: 30.2 G/DL (ref 32–36)
MCV RBC AUTO: 78 FL (ref 82–98)
MONOCYTES # BLD AUTO: 0.5 K/UL (ref 0.3–1)
MONOCYTES NFR BLD: 13.6 % (ref 4–15)
NEUTROPHILS # BLD AUTO: 2.2 K/UL (ref 1.8–7.7)
NEUTROPHILS NFR BLD: 66.2 % (ref 38–73)
NITRITE UR QL STRIP: NEGATIVE
NRBC BLD-RTO: 0 /100 WBC
PH UR STRIP: 8 [PH] (ref 5–8)
PLATELET # BLD AUTO: 215 K/UL (ref 150–450)
PMV BLD AUTO: 11.6 FL (ref 9.2–12.9)
POTASSIUM SERPL-SCNC: 3.9 MMOL/L (ref 3.5–5.1)
PROT SERPL-MCNC: 7 G/DL (ref 6–8.4)
PROT UR QL STRIP: NEGATIVE
PROTHROMBIN TIME: 15.6 SEC (ref 11.4–13.7)
RBC # BLD AUTO: 4.9 M/UL (ref 4.6–6.2)
SARS-COV-2 RDRP RESP QL NAA+PROBE: NEGATIVE
SODIUM SERPL-SCNC: 135 MMOL/L (ref 136–145)
SP GR UR STRIP: 1.01 (ref 1–1.03)
TROPONIN I SERPL HS-MCNC: 21 PG/ML (ref 0–14.9)
TROPONIN I SERPL HS-MCNC: 23.6 PG/ML (ref 0–14.9)
TSH SERPL DL<=0.005 MIU/L-ACNC: 2.67 UIU/ML (ref 0.34–5.6)
URN SPEC COLLECT METH UR: NORMAL
UROBILINOGEN UR STRIP-ACNC: NEGATIVE EU/DL
WBC # BLD AUTO: 3.32 K/UL (ref 3.9–12.7)

## 2022-12-08 PROCEDURE — 93005 ELECTROCARDIOGRAM TRACING: CPT | Performed by: INTERNAL MEDICINE

## 2022-12-08 PROCEDURE — 84484 ASSAY OF TROPONIN QUANT: CPT | Mod: 91 | Performed by: NURSE PRACTITIONER

## 2022-12-08 PROCEDURE — 96374 THER/PROPH/DIAG INJ IV PUSH: CPT

## 2022-12-08 PROCEDURE — 80053 COMPREHEN METABOLIC PANEL: CPT | Performed by: EMERGENCY MEDICINE

## 2022-12-08 PROCEDURE — 93010 EKG 12-LEAD: ICD-10-PCS | Mod: ,,, | Performed by: INTERNAL MEDICINE

## 2022-12-08 PROCEDURE — 96376 TX/PRO/DX INJ SAME DRUG ADON: CPT

## 2022-12-08 PROCEDURE — 36415 COLL VENOUS BLD VENIPUNCTURE: CPT | Performed by: EMERGENCY MEDICINE

## 2022-12-08 PROCEDURE — 83735 ASSAY OF MAGNESIUM: CPT | Performed by: EMERGENCY MEDICINE

## 2022-12-08 PROCEDURE — 81003 URINALYSIS AUTO W/O SCOPE: CPT | Performed by: EMERGENCY MEDICINE

## 2022-12-08 PROCEDURE — 85025 COMPLETE CBC W/AUTO DIFF WBC: CPT | Performed by: EMERGENCY MEDICINE

## 2022-12-08 PROCEDURE — 99285 EMERGENCY DEPT VISIT HI MDM: CPT | Mod: 25

## 2022-12-08 PROCEDURE — G0378 HOSPITAL OBSERVATION PER HR: HCPCS

## 2022-12-08 PROCEDURE — 63600175 PHARM REV CODE 636 W HCPCS: Performed by: EMERGENCY MEDICINE

## 2022-12-08 PROCEDURE — G0378 HOSPITAL OBSERVATION PER HR: HCPCS | Mod: CS

## 2022-12-08 PROCEDURE — 84443 ASSAY THYROID STIM HORMONE: CPT | Performed by: EMERGENCY MEDICINE

## 2022-12-08 PROCEDURE — 85730 THROMBOPLASTIN TIME PARTIAL: CPT | Performed by: EMERGENCY MEDICINE

## 2022-12-08 PROCEDURE — 84484 ASSAY OF TROPONIN QUANT: CPT | Performed by: EMERGENCY MEDICINE

## 2022-12-08 PROCEDURE — 25000003 PHARM REV CODE 250: Performed by: EMERGENCY MEDICINE

## 2022-12-08 PROCEDURE — 93010 ELECTROCARDIOGRAM REPORT: CPT | Mod: ,,, | Performed by: INTERNAL MEDICINE

## 2022-12-08 PROCEDURE — 25000003 PHARM REV CODE 250: Performed by: NURSE PRACTITIONER

## 2022-12-08 PROCEDURE — 63600175 PHARM REV CODE 636 W HCPCS: Performed by: NURSE PRACTITIONER

## 2022-12-08 PROCEDURE — 85610 PROTHROMBIN TIME: CPT | Performed by: EMERGENCY MEDICINE

## 2022-12-08 PROCEDURE — U0002 COVID-19 LAB TEST NON-CDC: HCPCS | Performed by: EMERGENCY MEDICINE

## 2022-12-08 PROCEDURE — 96375 TX/PRO/DX INJ NEW DRUG ADDON: CPT

## 2022-12-08 PROCEDURE — 83880 ASSAY OF NATRIURETIC PEPTIDE: CPT | Performed by: EMERGENCY MEDICINE

## 2022-12-08 RX ORDER — NALOXONE HCL 0.4 MG/ML
0.02 VIAL (ML) INJECTION
Status: DISCONTINUED | OUTPATIENT
Start: 2022-12-08 | End: 2022-12-09 | Stop reason: HOSPADM

## 2022-12-08 RX ORDER — SODIUM CHLORIDE 0.9 % (FLUSH) 0.9 %
10 SYRINGE (ML) INJECTION
Status: DISCONTINUED | OUTPATIENT
Start: 2022-12-08 | End: 2022-12-09 | Stop reason: HOSPADM

## 2022-12-08 RX ORDER — ONDANSETRON 2 MG/ML
4 INJECTION INTRAMUSCULAR; INTRAVENOUS EVERY 8 HOURS PRN
Status: DISCONTINUED | OUTPATIENT
Start: 2022-12-08 | End: 2022-12-09 | Stop reason: HOSPADM

## 2022-12-08 RX ORDER — FUROSEMIDE 10 MG/ML
40 INJECTION INTRAMUSCULAR; INTRAVENOUS 2 TIMES DAILY
Status: DISCONTINUED | OUTPATIENT
Start: 2022-12-08 | End: 2022-12-09 | Stop reason: HOSPADM

## 2022-12-08 RX ORDER — FUROSEMIDE 10 MG/ML
40 INJECTION INTRAMUSCULAR; INTRAVENOUS
Status: COMPLETED | OUTPATIENT
Start: 2022-12-08 | End: 2022-12-08

## 2022-12-08 RX ORDER — CLOPIDOGREL BISULFATE 75 MG/1
75 TABLET ORAL DAILY
Status: DISCONTINUED | OUTPATIENT
Start: 2022-12-09 | End: 2022-12-09 | Stop reason: HOSPADM

## 2022-12-08 RX ORDER — SPIRONOLACTONE 25 MG/1
25 TABLET ORAL DAILY
Status: DISCONTINUED | OUTPATIENT
Start: 2022-12-09 | End: 2022-12-09 | Stop reason: HOSPADM

## 2022-12-08 RX ORDER — SODIUM,POTASSIUM PHOSPHATES 280-250MG
2 POWDER IN PACKET (EA) ORAL
Status: DISCONTINUED | OUTPATIENT
Start: 2022-12-08 | End: 2022-12-09 | Stop reason: HOSPADM

## 2022-12-08 RX ORDER — LANOLIN ALCOHOL/MO/W.PET/CERES
800 CREAM (GRAM) TOPICAL
Status: DISCONTINUED | OUTPATIENT
Start: 2022-12-08 | End: 2022-12-09 | Stop reason: HOSPADM

## 2022-12-08 RX ORDER — LEFLUNOMIDE 20 MG/1
20 TABLET ORAL DAILY
Status: DISCONTINUED | OUTPATIENT
Start: 2022-12-09 | End: 2022-12-09 | Stop reason: HOSPADM

## 2022-12-08 RX ORDER — FINASTERIDE 5 MG/1
5 TABLET, FILM COATED ORAL DAILY
Status: DISCONTINUED | OUTPATIENT
Start: 2022-12-09 | End: 2022-12-09 | Stop reason: HOSPADM

## 2022-12-08 RX ORDER — TALC
6 POWDER (GRAM) TOPICAL NIGHTLY PRN
Status: DISCONTINUED | OUTPATIENT
Start: 2022-12-08 | End: 2022-12-09 | Stop reason: HOSPADM

## 2022-12-08 RX ORDER — IBUPROFEN 200 MG
16 TABLET ORAL
Status: DISCONTINUED | OUTPATIENT
Start: 2022-12-08 | End: 2022-12-09 | Stop reason: HOSPADM

## 2022-12-08 RX ORDER — ACETAMINOPHEN 325 MG/1
650 TABLET ORAL EVERY 4 HOURS PRN
Status: DISCONTINUED | OUTPATIENT
Start: 2022-12-08 | End: 2022-12-09 | Stop reason: HOSPADM

## 2022-12-08 RX ORDER — IBUPROFEN 200 MG
24 TABLET ORAL
Status: DISCONTINUED | OUTPATIENT
Start: 2022-12-08 | End: 2022-12-09 | Stop reason: HOSPADM

## 2022-12-08 RX ORDER — METHYLPREDNISOLONE SOD SUCC 125 MG
125 VIAL (EA) INJECTION
Status: COMPLETED | OUTPATIENT
Start: 2022-12-08 | End: 2022-12-08

## 2022-12-08 RX ORDER — LEVOTHYROXINE SODIUM 112 UG/1
112 TABLET ORAL
Status: DISCONTINUED | OUTPATIENT
Start: 2022-12-09 | End: 2022-12-09 | Stop reason: HOSPADM

## 2022-12-08 RX ORDER — METOPROLOL SUCCINATE 25 MG/1
25 TABLET, EXTENDED RELEASE ORAL DAILY
Status: DISCONTINUED | OUTPATIENT
Start: 2022-12-09 | End: 2022-12-09 | Stop reason: HOSPADM

## 2022-12-08 RX ORDER — SODIUM CHLORIDE 0.9 % (FLUSH) 0.9 %
10 SYRINGE (ML) INJECTION EVERY 12 HOURS PRN
Status: DISCONTINUED | OUTPATIENT
Start: 2022-12-08 | End: 2022-12-09 | Stop reason: HOSPADM

## 2022-12-08 RX ORDER — GLUCAGON 1 MG
1 KIT INJECTION
Status: DISCONTINUED | OUTPATIENT
Start: 2022-12-08 | End: 2022-12-09 | Stop reason: HOSPADM

## 2022-12-08 RX ORDER — AMIODARONE HYDROCHLORIDE 200 MG/1
200 TABLET ORAL DAILY
Status: DISCONTINUED | OUTPATIENT
Start: 2022-12-09 | End: 2022-12-09 | Stop reason: HOSPADM

## 2022-12-08 RX ORDER — PANTOPRAZOLE SODIUM 40 MG/1
40 TABLET, DELAYED RELEASE ORAL
Status: DISCONTINUED | OUTPATIENT
Start: 2022-12-09 | End: 2022-12-09 | Stop reason: HOSPADM

## 2022-12-08 RX ORDER — IPRATROPIUM BROMIDE AND ALBUTEROL SULFATE 2.5; .5 MG/3ML; MG/3ML
3 SOLUTION RESPIRATORY (INHALATION) EVERY 6 HOURS PRN
Status: DISCONTINUED | OUTPATIENT
Start: 2022-12-08 | End: 2022-12-09 | Stop reason: HOSPADM

## 2022-12-08 RX ORDER — LOSARTAN POTASSIUM 25 MG/1
25 TABLET ORAL DAILY
Status: DISCONTINUED | OUTPATIENT
Start: 2022-12-09 | End: 2022-12-09 | Stop reason: HOSPADM

## 2022-12-08 RX ORDER — FLUTICASONE PROPIONATE 50 MCG
2 SPRAY, SUSPENSION (ML) NASAL DAILY
Status: DISCONTINUED | OUTPATIENT
Start: 2022-12-09 | End: 2022-12-09 | Stop reason: HOSPADM

## 2022-12-08 RX ADMIN — GUAIFENESIN AND DEXTROMETHORPHAN HYDROBROMIDE 1 TABLET: 600; 30 TABLET, EXTENDED RELEASE ORAL at 10:12

## 2022-12-08 RX ADMIN — FUROSEMIDE 40 MG: 10 INJECTION, SOLUTION INTRAVENOUS at 03:12

## 2022-12-08 RX ADMIN — METHYLPREDNISOLONE SODIUM SUCCINATE 125 MG: 125 INJECTION, POWDER, FOR SOLUTION INTRAMUSCULAR; INTRAVENOUS at 03:12

## 2022-12-08 RX ADMIN — APIXABAN 2.5 MG: 2.5 TABLET, FILM COATED ORAL at 10:12

## 2022-12-08 RX ADMIN — FUROSEMIDE 40 MG: 10 INJECTION, SOLUTION INTRAMUSCULAR; INTRAVENOUS at 10:12

## 2022-12-08 RX ADMIN — NITROGLYCERIN 1 INCH: 20 OINTMENT TOPICAL at 03:12

## 2022-12-08 NOTE — ED PROVIDER NOTES
"Encounter Date: 2022       History     Chief Complaint   Patient presents with    Shortness of Breath     And dizziness. X2 days. "I have some fluid on me"     Patient with history of coronary artery disease, atrial fibrillation, cardiomyopathy, sarcoidosis.  Patient reports 2 day history of orthopnea and dyspnea on exertion.  No chest pain, pleurisy hemoptysis.  No gastrointestinal bleeding.  This is similar to previous episode of congestive heart failure exacerbation.  Patient denies any fever chills.  Patient does have congestion.    Review of patient's allergies indicates:  No Known Allergies  Past Medical History:   Diagnosis Date    A-fib     AICD (automatic cardioverter/defibrillator) present     Arthritis     Atrial fibrillation     Cancer     Cardiomyopathy     COPD (chronic obstructive pulmonary disease)     Heart failure     Histoplasmosis     Hypertension     Sarcoidosis     Sleep apnea     Thyroid disease      Past Surgical History:   Procedure Laterality Date    CARDIAC CATHETERIZATION      CARDIAC DEFIBRILLATOR PLACEMENT Left 2020    CORONARY ANGIOGRAPHY Left 2019    Procedure: CATHETERIZATION, HEART, LEFT (RIGHT RADIAL);  Surgeon: Gerald Romero MD;  Location: [facility] CATH/EP LAB;  Service: Cardiology;  Laterality: Left;    CORONARY STENT PLACEMENT      INSERTION OF BIVENTRICULAR IMPLANTABLE CARDIOVERTER-DEFIBRILLATOR (ICD)      TOTAL THYROIDECTOMY       Family History   Problem Relation Age of Onset    Hypertension Father      Social History     Tobacco Use    Smoking status: Former     Types: Cigarettes     Quit date: 2014     Years since quittin.2    Smokeless tobacco: Former   Substance Use Topics    Alcohol use: Not Currently    Drug use: Never     Review of Systems   Constitutional:  Negative for chills and fever.   HENT:  Positive for congestion. Negative for sore throat.    Eyes:  Negative for photophobia and visual disturbance.   Respiratory:  Positive for " shortness of breath.    Cardiovascular:  Negative for chest pain.   Gastrointestinal:  Negative for abdominal pain and vomiting.   Genitourinary:  Negative for dysuria.   Musculoskeletal:  Negative for joint swelling.   Skin:  Negative for rash.   Neurological:  Negative for weakness and headaches.   Psychiatric/Behavioral:  Negative for confusion.      Physical Exam     Initial Vitals [12/08/22 1333]   BP Pulse Resp Temp SpO2   (!) 136/96 76 19 97.6 °F (36.4 °C) 96 %      MAP       --         Physical Exam    Nursing note and vitals reviewed.  Constitutional: He is not diaphoretic. No distress.   HENT:   Head: Normocephalic and atraumatic.   Eyes: Conjunctivae are normal.   Neck:   Normal range of motion.  Cardiovascular:  Regular rhythm.           Pulmonary/Chest:   Diffuse fine inspiratory crackles.  No respiratory distress.  Good air movement.   Abdominal: Abdomen is soft. There is no abdominal tenderness.   Musculoskeletal:         General: Normal range of motion.      Cervical back: Normal range of motion.     Skin: No rash noted.   Psychiatric: He has a normal mood and affect.       ED Course   Procedures  Labs Reviewed   CBC W/ AUTO DIFFERENTIAL - Abnormal; Notable for the following components:       Result Value    WBC 3.32 (*)     Hemoglobin 11.6 (*)     Hematocrit 38.4 (*)     MCV 78 (*)     MCH 23.7 (*)     MCHC 30.2 (*)     RDW 14.9 (*)     Lymph # 0.5 (*)     Lymph % 16.3 (*)     All other components within normal limits   COMPREHENSIVE METABOLIC PANEL - Abnormal; Notable for the following components:    Sodium 135 (*)     Calcium 8.5 (*)     Albumin 3.2 (*)     ALT 7 (*)     Anion Gap 6 (*)     All other components within normal limits   TROPONIN I HIGH SENSITIVITY - Abnormal; Notable for the following components:    Troponin I High Sensitivity 23.6 (*)     All other components within normal limits   B-TYPE NATRIURETIC PEPTIDE - Abnormal; Notable for the following components:     (*)     All  other components within normal limits   PROTIME-INR - Abnormal; Notable for the following components:    PT 15.6 (*)     All other components within normal limits   APTT - Abnormal; Notable for the following components:    aPTT 41.7 (*)     All other components within normal limits   MAGNESIUM   URINALYSIS, REFLEX TO URINE CULTURE    Narrative:     Specimen Source->Urine   SARS-COV-2 RNA AMPLIFICATION, QUAL        ECG Results              EKG 12-lead (In process)  Result time 12/08/22 15:35:04      In process by Interface, Lab In [facility] (12/08/22 15:35:04)                   Narrative:    Test Reason : R06.02,    Vent. Rate : 081 BPM     Atrial Rate : 081 BPM     P-R Int : 238 ms          QRS Dur : 104 ms      QT Int : 418 ms       P-R-T Axes : -21 -01 205 degrees     QTc Int : 485 ms    Atrial-paced rhythm with prolonged AV conduction  T wave abnormality, consider lateral ischemia  Prolonged QT  Abnormal ECG  When compared with ECG of 23-NOV-2021 09:52,  T wave inversion no longer evident in Inferior leads    Referred By: [email or web address]   SELF           Confirmed By:                                   Imaging Results              X-Ray Chest AP Portable (Final result)  Result time 12/08/22 14:08:14      Final result by Evan Rebolledo MD (12/08/22 14:08:14)                   Narrative:    Reason: Shortness breath SOB Hx-HTN, COPD, AFIB, Sarcoidosis, Heart Failure, Thyroid Disease    FINDINGS:  Portable chest at 1348 compared with 5/4/2020 and CT 9/28/2021 shows unchanged diffuse and prominent cardiomediastinal silhouette. Dual lead left transvenous ICD unchanged partially obscuring the left mid lung.    Irregular confluent opacities in bilateral superior lung zones have not significantly changed. Reticulonodular interstitial opacities also diffusely effecting bilateral lungs are also unchanged. No pleural effusion or pneumothorax. Central pulmonary vasculature is not significantly changed. No acute osseous  abnormality.    IMPRESSION:  Unchanged diffuse pulmonary opacities including superior lung zone alveolar opacities and more diffuse reticulonodular positional opacities, without new abnormality. This is most likely related to patient's known sarcoidosis, particularly given similar appearance on 9/28/2021 PET/CT.    Electronically signed by:  Evan Rebolledo MD  12/8/2022 2:08 PM CST Workstation: [id number]                                     Medications   furosemide injection 40 mg (40 mg Intravenous Given 12/8/22 1521)   nitroGLYCERIN 2% TD oint ointment 1 inch (1 inch Topical (Top) Given 12/8/22 1521)   methylPREDNISolone sodium succinate injection 125 mg (125 mg Intravenous Given 12/8/22 1538)     Medical Decision Making:   History:   Old Medical Records: I decided to obtain old medical records.  Clinical Tests:   Lab Tests: Reviewed  Radiological Study: Reviewed  Medical Tests: Reviewed  ED Management:  Patient presents with increasing shortness of breath.  Symptoms more consistent with congestive heart failure exacerbation.  Difficult to determine pulmonary edema due to extensive changes from sarcoidosis.  Patient remains with significant symptoms.  Will begin nitrates and Lasix.  Will give dose of corticosteroids for possible sarcoidosis exacerbation.  Hospitalist consulted for possible admission.                        Clinical Impression:   Final diagnoses:  [R06.02] Shortness of breath  [R06.02] SOB (shortness of breath)  [I50.23] Acute on chronic systolic congestive heart failure (Primary)        ED Disposition Condition    Admit Stable                Nathan Austin MD  12/08/22 1059

## 2022-12-08 NOTE — SUBJECTIVE & OBJECTIVE
Past Medical History:   Diagnosis Date    A-fib     AICD (automatic cardioverter/defibrillator) present     Arthritis     Atrial fibrillation     Cancer     Cardiomyopathy     COPD (chronic obstructive pulmonary disease)     Heart failure     Histoplasmosis     Hypertension     Sarcoidosis     Sleep apnea     Thyroid disease        Past Surgical History:   Procedure Laterality Date    CARDIAC CATHETERIZATION      CARDIAC DEFIBRILLATOR PLACEMENT Left 01/2020    CORONARY ANGIOGRAPHY Left 9/16/2019    Procedure: CATHETERIZATION, HEART, LEFT (RIGHT RADIAL);  Surgeon: Gerald Romero MD;  Location: [facility] CATH/EP LAB;  Service: Cardiology;  Laterality: Left;    CORONARY STENT PLACEMENT      INSERTION OF BIVENTRICULAR IMPLANTABLE CARDIOVERTER-DEFIBRILLATOR (ICD)      TOTAL THYROIDECTOMY         Review of patient's allergies indicates:  No Known Allergies    No current facility-administered medications on file prior to encounter.     Current Outpatient Medications on File Prior to Encounter   Medication Sig    acetaminophen (TYLENOL) 500 MG tablet Take 500 mg by mouth every 6 (six) hours as needed for Pain.    amiodarone (PACERONE) 200 MG Tab Take 1 tablet (200 mg total) by mouth once daily.    apixaban (ELIQUIS) 2.5 mg Tab Take 2.5 mg by mouth 2 (two) times daily.    clopidogreL (PLAVIX) 75 mg tablet Take 1 tablet (75 mg total) by mouth once daily.    finasteride (PROSCAR) 5 mg tablet Take 5 mg by mouth once daily.    furosemide (LASIX) 20 MG tablet Take 1 tablet (20 mg total) by mouth once daily.    leflunomide (ARAVA) 20 MG Tab Take 20 mg by mouth once daily.    levothyroxine (SYNTHROID) 112 MCG tablet Take 1 tablet (112 mcg total) by mouth before breakfast. Take on empty stomach an hour before another other medications.    loratadine (CLARITIN) 10 mg tablet Take 10 mg by mouth once daily.    losartan (COZAAR) 25 MG tablet Take 25 mg by mouth once daily.    methotrexate 2.5 MG Tab Take 2.5 mg by mouth every 7  days.    metoprolol succinate (TOPROL-XL) 25 MG 24 hr tablet Take 25 mg by mouth once daily.     pantoprazole (PROTONIX) 40 MG tablet Take 40 mg by mouth once daily.    potassium chloride (KLOR-CON) 20 mEq Pack Take 20 mEq by mouth once.    prednisoLONE acetate (PRED FORTE) 1 % DrpS 1 drop 4 (four) times daily as needed.    spironolactone (ALDACTONE) 25 MG tablet TAKE 1 TABLET EVERY DAY    [DISCONTINUED] albuterol (PROVENTIL/VENTOLIN HFA) 90 mcg/actuation inhaler Inhale 2 puffs into the lungs every 6 (six) hours as needed for Wheezing. Rescue    [DISCONTINUED] cyanocobalamin (VITAMIN B-12) 1000 MCG tablet Take 1,000 mcg by mouth once daily.    [DISCONTINUED] ferrous gluconate (FERGON) 324 MG tablet Take 324 mg by mouth daily with breakfast.    [DISCONTINUED] folic acid (FOLVITE) 1 MG tablet Take 1 mg by mouth once daily.    [DISCONTINUED] ipratropium-albuteroL (COMBIVENT RESPIMAT)  mcg/actuation inhaler Inhale 1 puff into the lungs every 6 (six) hours as needed for Wheezing. Rescue    [DISCONTINUED] thiamine 100 MG tablet Take 100 mg by mouth once daily.     Family History       Problem Relation (Age of Onset)    Hypertension Father          Tobacco Use    Smoking status: Former     Types: Cigarettes     Quit date: 2014     Years since quittin.2    Smokeless tobacco: Former   Substance and Sexual Activity    Alcohol use: Not Currently    Drug use: Never    Sexual activity: Not on file     Review of Systems   Constitutional:  Negative for chills, diaphoresis and fever.   HENT:  Positive for congestion. Negative for postnasal drip, sinus pressure and sore throat.    Eyes:  Negative for visual disturbance.   Respiratory:  Positive for cough and shortness of breath. Negative for chest tightness and wheezing.    Cardiovascular:  Negative for chest pain, palpitations and leg swelling.   Gastrointestinal:  Negative for abdominal distention, abdominal pain, blood in stool, constipation, diarrhea, nausea and  vomiting.   Endocrine: Negative.    Genitourinary:  Negative for dysuria.   Musculoskeletal: Negative.    Skin: Negative.    Allergic/Immunologic: Negative.    Neurological:  Negative for dizziness, weakness, numbness and headaches.   Hematological: Negative.    Psychiatric/Behavioral: Negative.     Objective:     Vital Signs (Most Recent):  Temp: 97.6 °F (36.4 °C) (12/08/22 1333)  Pulse: 87 (12/08/22 1533)  Resp: 19 (12/08/22 1333)  BP: (!) 150/96 (12/08/22 1533)  SpO2: 97 % (12/08/22 1553)   Vital Signs (24h Range):  Temp:  [97.6 °F (36.4 °C)] 97.6 °F (36.4 °C)  Pulse:  [76-87] 87  Resp:  [19] 19  SpO2:  [93 %-97 %] 97 %  BP: (136-150)/(94-96) 150/96     Weight: 68 kg (150 lb)  Body mass index is 22.81 kg/m².    Physical Exam  Constitutional:       General: He is not in acute distress.     Appearance: Normal appearance. He is well-developed. He is not toxic-appearing or diaphoretic.   HENT:      Head: Normocephalic and atraumatic.   Eyes:      General: Lids are normal.      Conjunctiva/sclera: Conjunctivae normal.      Pupils: Pupils are equal, round, and reactive to light.   Neck:      Thyroid: No thyroid mass or thyromegaly.      Vascular: Normal carotid pulses. No JVD.      Trachea: Trachea normal. No tracheal deviation.   Cardiovascular:      Rate and Rhythm: Normal rate and regular rhythm.      Pulses: Normal pulses.      Heart sounds: Normal heart sounds, S1 normal and S2 normal.   Pulmonary:      Effort: Pulmonary effort is normal.      Breath sounds: No stridor. Examination of the left-lower field reveals decreased breath sounds. Decreased breath sounds present.   Abdominal:      General: Bowel sounds are normal.      Palpations: Abdomen is soft.      Tenderness: There is no abdominal tenderness.   Musculoskeletal:         General: Normal range of motion.      Cervical back: Full passive range of motion without pain, normal range of motion and neck supple.   Skin:     General: Skin is warm and dry.       Nails: There is no clubbing.   Neurological:      Mental Status: He is alert and oriented to person, place, and time.      Cranial Nerves: No cranial nerve deficit.      Sensory: No sensory deficit.   Psychiatric:         Speech: Speech normal.         Behavior: Behavior normal. Behavior is cooperative.         Thought Content: Thought content normal.         Judgment: Judgment normal.         CRANIAL NERVES     CN III, IV, VI   Pupils are equal, round, and reactive to light.     Significant Labs: All pertinent labs within the past 24 hours have been reviewed.  Bilirubin:   Recent Labs   Lab 12/08/22  1355   BILITOT 0.9       BMP:   Recent Labs   Lab 12/08/22  1355   GLU 85   *   K 3.9      CO2 26   BUN 12   CREATININE 0.9   CALCIUM 8.5*   MG 2.1     CBC:   Recent Labs   Lab 12/08/22  1355   WBC 3.32*   HGB 11.6*   HCT 38.4*        CMP:   Recent Labs   Lab 12/08/22  1355   *   K 3.9      CO2 26   GLU 85   BUN 12   CREATININE 0.9   CALCIUM 8.5*   PROT 7.0   ALBUMIN 3.2*   BILITOT 0.9   ALKPHOS 67   AST 15   ALT 7*   ANIONGAP 6*     Cardiac Markers:   Recent Labs   Lab 12/08/22  1355   *     Coagulation:   Recent Labs   Lab 12/08/22  1355   INR 1.3   APTT 41.7*     Magnesium:   Recent Labs   Lab 12/08/22  1355   MG 2.1       Troponin:   Recent Labs   Lab 12/08/22  1355   TROPONINIHS 23.6*     TSH:   Recent Labs   Lab 12/08/22  1355   TSH 2.670     Urine Studies:   Recent Labs   Lab 12/08/22  1358   COLORU Yellow   APPEARANCEUA Clear   PHUR 8.0   SPECGRAV 1.010   PROTEINUA Negative   GLUCUA Negative   KETONESU Negative   BILIRUBINUA Negative   OCCULTUA Negative   NITRITE Negative   UROBILINOGEN Negative   LEUKOCYTESUR Negative       Significant Imaging: I have reviewed all pertinent imaging results/findings within the past 24 hours.  X-Ray Chest AP Portable    Result Date: 12/8/2022  Reason: Shortness breath SOB Hx-HTN, COPD, AFIB, Sarcoidosis, Heart Failure, Thyroid Disease  FINDINGS: Portable chest at 1348 compared with 5/4/2020 and CT 9/28/2021 shows unchanged diffuse and prominent cardiomediastinal silhouette. Dual lead left transvenous ICD unchanged partially obscuring the left mid lung. Irregular confluent opacities in bilateral superior lung zones have not significantly changed. Reticulonodular interstitial opacities also diffusely effecting bilateral lungs are also unchanged. No pleural effusion or pneumothorax. Central pulmonary vasculature is not significantly changed. No acute osseous abnormality. IMPRESSION: Unchanged diffuse pulmonary opacities including superior lung zone alveolar opacities and more diffuse reticulonodular positional opacities, without new abnormality. This is most likely related to patient's known sarcoidosis, particularly given similar appearance on 9/28/2021 PET/CT. Electronically signed by:  Evan Rebolledo MD  12/8/2022 2:08 PM [facility] Workstation: [id number]

## 2022-12-08 NOTE — HPI
"Trevor Madrid is a 72 y.o. male with a history as  has a past medical history of A-fib, AICD (automatic cardioverter/defibrillator) present, Arthritis, Atrial fibrillation, Cancer, Cardiomyopathy, COPD (chronic obstructive pulmonary disease), Heart failure, Histoplasmosis, Hypertension, Sarcoidosis, Sleep apnea, and Thyroid disease. who presented to the ED with a Shortness of Breath (And dizziness. X2 days. "I have some fluid on me")    Patient presented to the emergency room, with family at bedside, with the complaint of shortness of breath x2 days.  And reports not being able to sleep well because of shortness of Breath, congestion with productive cough throughout the night with laying down.  Reports using nasal spray, but not helping. Patient also reports history of sarcoid lung disease, not on home oxygen is followed by pulmonology at [facility]. He also reports Hx of a-fib with ICD placed, followed by Dr. LINDA West. He denies any recent palpitations, irregular heart beats or chest pain.     Denies fever, chills, diaphoresis, HA, chest pain, palpitations, NVD, recent sick contacts. No aggravating and alleviating factor.      Lab and imaging obtained and reviewed.   CBC shows WBca 3.32 H/H 11.6/38.4 MCV 78 MCH 23.7 MCHC 30.2 RDW 14.9 Lymph% 16.3. PT/INT 15.6/1.3 aPTT 41.7. CMP shows NA+ 135 AG 6 Ca+ 8.5 Alb 3.2 ALT 7.  Initial hght sensitivity troponin 23.6. EKG shows Atrial-paced rhythm with prolonged AV conduction. On admit, afebrile HR 76 RR 19 /96 sats 96% on RA.    CXR shows unchanged diffuse pulmonary opacities including superior lung zone alveolar opacities and more diffuse reticulonodular positional opacities, without new abnormality. This is most likely related to patient's known sarcoidosis, particularly given similar appearance on 9/28/2021 PET/CT.    Per ED provider, Patient with CXR (as above), also noted to have elevated BNP and initial troponin. Given Hx of sarcoidosis and HF, Lasix, SL " NTG and steroids initiated. Will admit for A/C HF with IV diuresing and cardiology consult appreciated.

## 2022-12-08 NOTE — H&P
"[facility] - Emergency Dept  Hospital Medicine  History & Physical    Patient Name: Trevor Madrid  MRN: [id number]  Patient Class: OP- Observation  Admission Date: 12/8/2022  Attending Physician: Abilio Harrison MD   Primary Care Provider: Primary Doctor No         Patient information was obtained from patient and ER records.     Subjective:     Principal Problem:Acute on chronic heart failure    Chief Complaint:   Chief Complaint   Patient presents with    Shortness of Breath     And dizziness. X2 days. "I have some fluid on me"        HPI: Trevor Madrid is a 72 y.o. male with a history as  has a past medical history of A-fib, AICD (automatic cardioverter/defibrillator) present, Arthritis, Atrial fibrillation, Cancer, Cardiomyopathy, COPD (chronic obstructive pulmonary disease), Heart failure, Histoplasmosis, Hypertension, Sarcoidosis, Sleep apnea, and Thyroid disease. who presented to the ED with a Shortness of Breath (And dizziness. X2 days. "I have some fluid on me")    Patient presented to the emergency room, with family at bedside, with the complaint of shortness of breath x2 days.  And reports not being able to sleep well because of shortness of Breath, congestion with productive cough throughout the night with laying down.  Reports using nasal spray, but not helping. Patient also reports history of sarcoid lung disease, not on home oxygen.    Denies fever, chills, diaphoresis, HA, chest pain, palpitations, NVD, recent sick contacts. No aggravating and alleviating factor.      Lab and imaging obtained and reviewed.   CBC shows WBca 3.32 H/H 11.6/38.4 MCV 78 MCH 23.7 MCHC 30.2 RDW 14.9 Lymph% 16.3. PT/INT 15.6/1.3 aPTT 41.7. CMP shows NA+ 135 AG 6 Ca+ 8.5 Alb 3.2 ALT 7.  Initial hght sensitivity troponin 23.6. EKG shows Atrial-paced rhythm with prolonged AV conduction. On admit, afebrile HR 76 RR 19 /96 sats 96% on RA.    CXR shows unchanged diffuse pulmonary opacities including " superior lung zone alveolar opacities and more diffuse reticulonodular positional opacities, without new abnormality. This is most likely related to patient's known sarcoidosis, particularly given similar appearance on 9/28/2021 PET/CT.    Per ED provider, Patient with CXR (as above), also noted to have elevated BNP and initial troponin. Given Hx of sarcoidosis and HF, Lasix, SL NTG and steroids initiated.                 Past Medical History:   Diagnosis Date    A-fib     AICD (automatic cardioverter/defibrillator) present     Arthritis     Atrial fibrillation     Cancer     Cardiomyopathy     COPD (chronic obstructive pulmonary disease)     Heart failure     Histoplasmosis     Hypertension     Sarcoidosis     Sleep apnea     Thyroid disease        Past Surgical History:   Procedure Laterality Date    CARDIAC CATHETERIZATION      CARDIAC DEFIBRILLATOR PLACEMENT Left 01/2020    CORONARY ANGIOGRAPHY Left 9/16/2019    Procedure: CATHETERIZATION, HEART, LEFT (RIGHT RADIAL);  Surgeon: Gerald Romero MD;  Location: [facility] CATH/EP LAB;  Service: Cardiology;  Laterality: Left;    CORONARY STENT PLACEMENT      INSERTION OF BIVENTRICULAR IMPLANTABLE CARDIOVERTER-DEFIBRILLATOR (ICD)      TOTAL THYROIDECTOMY         Review of patient's allergies indicates:  No Known Allergies    No current facility-administered medications on file prior to encounter.     Current Outpatient Medications on File Prior to Encounter   Medication Sig    acetaminophen (TYLENOL) 500 MG tablet Take 500 mg by mouth every 6 (six) hours as needed for Pain.    amiodarone (PACERONE) 200 MG Tab Take 1 tablet (200 mg total) by mouth once daily.    apixaban (ELIQUIS) 2.5 mg Tab Take 2.5 mg by mouth 2 (two) times daily.    clopidogreL (PLAVIX) 75 mg tablet Take 1 tablet (75 mg total) by mouth once daily.    finasteride (PROSCAR) 5 mg tablet Take 5 mg by mouth once daily.    furosemide (LASIX) 20 MG tablet Take 1 tablet (20 mg  total) by mouth once daily.    leflunomide (ARAVA) 20 MG Tab Take 20 mg by mouth once daily.    levothyroxine (SYNTHROID) 112 MCG tablet Take 1 tablet (112 mcg total) by mouth before breakfast. Take on empty stomach an hour before another other medications.    loratadine (CLARITIN) 10 mg tablet Take 10 mg by mouth once daily.    losartan (COZAAR) 25 MG tablet Take 25 mg by mouth once daily.    methotrexate 2.5 MG Tab Take 2.5 mg by mouth every 7 days.    metoprolol succinate (TOPROL-XL) 25 MG 24 hr tablet Take 25 mg by mouth once daily.     pantoprazole (PROTONIX) 40 MG tablet Take 40 mg by mouth once daily.    potassium chloride (KLOR-CON) 20 mEq Pack Take 20 mEq by mouth once.    prednisoLONE acetate (PRED FORTE) 1 % DrpS 1 drop 4 (four) times daily as needed.    spironolactone (ALDACTONE) 25 MG tablet TAKE 1 TABLET EVERY DAY    [DISCONTINUED] albuterol (PROVENTIL/VENTOLIN HFA) 90 mcg/actuation inhaler Inhale 2 puffs into the lungs every 6 (six) hours as needed for Wheezing. Rescue    [DISCONTINUED] cyanocobalamin (VITAMIN B-12) 1000 MCG tablet Take 1,000 mcg by mouth once daily.    [DISCONTINUED] ferrous gluconate (FERGON) 324 MG tablet Take 324 mg by mouth daily with breakfast.    [DISCONTINUED] folic acid (FOLVITE) 1 MG tablet Take 1 mg by mouth once daily.    [DISCONTINUED] ipratropium-albuteroL (COMBIVENT RESPIMAT)  mcg/actuation inhaler Inhale 1 puff into the lungs every 6 (six) hours as needed for Wheezing. Rescue    [DISCONTINUED] thiamine 100 MG tablet Take 100 mg by mouth once daily.     Family History       Problem Relation (Age of Onset)    Hypertension Father          Tobacco Use    Smoking status: Former     Types: Cigarettes     Quit date: 2014     Years since quittin.2    Smokeless tobacco: Former   Substance and Sexual Activity    Alcohol use: Not Currently    Drug use: Never    Sexual activity: Not on file     Review of Systems   Constitutional:  Negative for  chills, diaphoresis and fever.   HENT:  Positive for congestion. Negative for postnasal drip, sinus pressure and sore throat.    Eyes:  Negative for visual disturbance.   Respiratory:  Positive for cough and shortness of breath. Negative for chest tightness and wheezing.    Cardiovascular:  Negative for chest pain, palpitations and leg swelling.   Gastrointestinal:  Negative for abdominal distention, abdominal pain, blood in stool, constipation, diarrhea, nausea and vomiting.   Endocrine: Negative.    Genitourinary:  Negative for dysuria.   Musculoskeletal: Negative.    Skin: Negative.    Allergic/Immunologic: Negative.    Neurological:  Negative for dizziness, weakness, numbness and headaches.   Hematological: Negative.    Psychiatric/Behavioral: Negative.     Objective:     Vital Signs (Most Recent):  Temp: 97.6 °F (36.4 °C) (12/08/22 1333)  Pulse: 87 (12/08/22 1533)  Resp: 19 (12/08/22 1333)  BP: (!) 150/96 (12/08/22 1533)  SpO2: 97 % (12/08/22 1553)   Vital Signs (24h Range):  Temp:  [97.6 °F (36.4 °C)] 97.6 °F (36.4 °C)  Pulse:  [76-87] 87  Resp:  [19] 19  SpO2:  [93 %-97 %] 97 %  BP: (136-150)/(94-96) 150/96     Weight: 68 kg (150 lb)  Body mass index is 22.81 kg/m².    Physical Exam  Constitutional:       General: He is not in acute distress.     Appearance: Normal appearance. He is well-developed. He is not toxic-appearing or diaphoretic.   HENT:      Head: Normocephalic and atraumatic.   Eyes:      General: Lids are normal.      Conjunctiva/sclera: Conjunctivae normal.      Pupils: Pupils are equal, round, and reactive to light.   Neck:      Thyroid: No thyroid mass or thyromegaly.      Vascular: Normal carotid pulses. No JVD.      Trachea: Trachea normal. No tracheal deviation.   Cardiovascular:      Rate and Rhythm: Normal rate and regular rhythm.      Pulses: Normal pulses.      Heart sounds: Normal heart sounds, S1 normal and S2 normal.   Pulmonary:      Effort: Pulmonary effort is normal.      Breath  sounds: No stridor. Examination of the left-lower field reveals decreased breath sounds. Decreased breath sounds present.   Abdominal:      General: Bowel sounds are normal.      Palpations: Abdomen is soft.      Tenderness: There is no abdominal tenderness.   Musculoskeletal:         General: Normal range of motion.      Cervical back: Full passive range of motion without pain, normal range of motion and neck supple.   Skin:     General: Skin is warm and dry.      Nails: There is no clubbing.   Neurological:      Mental Status: He is alert and oriented to person, place, and time.      Cranial Nerves: No cranial nerve deficit.      Sensory: No sensory deficit.   Psychiatric:         Speech: Speech normal.         Behavior: Behavior normal. Behavior is cooperative.         Thought Content: Thought content normal.         Judgment: Judgment normal.         CRANIAL NERVES     CN III, IV, VI   Pupils are equal, round, and reactive to light.     Significant Labs: All pertinent labs within the past 24 hours have been reviewed.  Bilirubin:   Recent Labs   Lab 12/08/22  1355   BILITOT 0.9       BMP:   Recent Labs   Lab 12/08/22  1355   GLU 85   *   K 3.9      CO2 26   BUN 12   CREATININE 0.9   CALCIUM 8.5*   MG 2.1     CBC:   Recent Labs   Lab 12/08/22  1355   WBC 3.32*   HGB 11.6*   HCT 38.4*        CMP:   Recent Labs   Lab 12/08/22  1355   *   K 3.9      CO2 26   GLU 85   BUN 12   CREATININE 0.9   CALCIUM 8.5*   PROT 7.0   ALBUMIN 3.2*   BILITOT 0.9   ALKPHOS 67   AST 15   ALT 7*   ANIONGAP 6*     Cardiac Markers:   Recent Labs   Lab 12/08/22  1355   *     Coagulation:   Recent Labs   Lab 12/08/22  1355   INR 1.3   APTT 41.7*     Magnesium:   Recent Labs   Lab 12/08/22  1355   MG 2.1       Troponin:   Recent Labs   Lab 12/08/22  1355   TROPONINIHS 23.6*     TSH:   Recent Labs   Lab 12/08/22  1355   TSH 2.670     Urine Studies:   Recent Labs   Lab 12/08/22  1358   COLORU Yellow    APPEARANCEUA Clear   PHUR 8.0   SPECGRAV 1.010   PROTEINUA Negative   GLUCUA Negative   KETONESU Negative   BILIRUBINUA Negative   OCCULTUA Negative   NITRITE Negative   UROBILINOGEN Negative   LEUKOCYTESUR Negative       Significant Imaging: I have reviewed all pertinent imaging results/findings within the past 24 hours.  X-Ray Chest AP Portable    Result Date: 12/8/2022  Reason: Shortness breath SOB Hx-HTN, COPD, AFIB, Sarcoidosis, Heart Failure, Thyroid Disease FINDINGS: Portable chest at 1348 compared with 5/4/2020 and CT 9/28/2021 shows unchanged diffuse and prominent cardiomediastinal silhouette. Dual lead left transvenous ICD unchanged partially obscuring the left mid lung. Irregular confluent opacities in bilateral superior lung zones have not significantly changed. Reticulonodular interstitial opacities also diffusely effecting bilateral lungs are also unchanged. No pleural effusion or pneumothorax. Central pulmonary vasculature is not significantly changed. No acute osseous abnormality. IMPRESSION: Unchanged diffuse pulmonary opacities including superior lung zone alveolar opacities and more diffuse reticulonodular positional opacities, without new abnormality. This is most likely related to patient's known sarcoidosis, particularly given similar appearance on 9/28/2021 PET/CT. Electronically signed by:  Evan Rebolledo MD  12/8/2022 2:08 PM [facility] Workstation: [id number]       Assessment/Plan:     Active Hospital Problems    Diagnosis  POA    *Acute on chronic heart failure [I50.9]  Yes     Priority: 1 - High    H/O thyroidectomy [E89.0]  Yes    Thyroid disease [E07.9]  Yes    ICD (implantable cardioverter-defibrillator), dual, in situ [Z95.810]  Yes    Hypertension [I10]  Yes    Dyslipidemia [E78.5]  Yes    Paroxysmal atrial fibrillation [I48.0]  Yes    Sarcoidosis [D86.9]  Yes    Chronic leukopenia [D72.819]  Yes      Resolved Hospital Problems   No resolved problems to display.     72-year-old  male with history as above presented to the hospital with complaints of shortness of shortness of Breath x2 days.  Noted to have elevated BNP and initial high sensitivity troponin.     Plan:  Admit to observation - telemetry for A/C HF  Continuous cardiac monitoring  Continue to trend serial troponins q4 hours X 2 likely elevated 2/2 SD  Continue IV diuretics for now  Cardiac diet with fluid restriction  Strict I/Os and daily weights  Echo   Cardiology consult appreciated   Daily labs  Electrolytes sliding scale repletion  Continue chronic home medications  Further plan as per clinical course    Given IV steroids 2/2 ?poss acute sarcoid. Will hold off on additional IV steroid use for now. Patient is not in any acute respiratory distress. CXR is unchanged from previous imaging.      VTE Risk Mitigation (From admission, onward)         Ordered     IP VTE HIGH RISK PATIENT  Once         12/08/22 1627     Place sequential compression device  Until discontinued         12/08/22 1627     Reason for No Pharmacological VTE Prophylaxis  Once        Question:  Reasons:  Answer:  Already adequately anticoagulated on oral Anticoagulants    12/08/22 1627                   EVE Mike  Department of Hospital Medicine   [facility] - Emergency Dept

## 2022-12-09 ENCOUNTER — CLINICAL SUPPORT (OUTPATIENT)
Dept: CARDIOLOGY | Facility: HOSPITAL | Age: 72
End: 2022-12-09
Attending: EMERGENCY MEDICINE
Payer: MEDICARE

## 2022-12-09 VITALS
HEART RATE: 83 BPM | DIASTOLIC BLOOD PRESSURE: 90 MMHG | SYSTOLIC BLOOD PRESSURE: 126 MMHG | WEIGHT: 150 LBS | OXYGEN SATURATION: 94 % | BODY MASS INDEX: 22.73 KG/M2 | HEIGHT: 68 IN | RESPIRATION RATE: 18 BRPM | TEMPERATURE: 97 F

## 2022-12-09 VITALS — WEIGHT: 150 LBS | HEIGHT: 68 IN | BODY MASS INDEX: 22.73 KG/M2

## 2022-12-09 LAB
ALBUMIN SERPL BCP-MCNC: 3.6 G/DL (ref 3.5–5.2)
ALP SERPL-CCNC: 73 U/L (ref 55–135)
ALT SERPL W/O P-5'-P-CCNC: 11 U/L (ref 10–44)
ANION GAP SERPL CALC-SCNC: 11 MMOL/L (ref 8–16)
AORTIC ROOT ANNULUS: 3.77 CM
AORTIC VALVE CUSP SEPERATION: 1.93 CM
AST SERPL-CCNC: 15 U/L (ref 10–40)
AV INDEX (PROSTH): 0.47
AV MEAN GRADIENT: 6 MMHG
AV PEAK GRADIENT: 10 MMHG
AV REGURGITATION PRESSURE HALF TIME: 422.88 MS
AV VALVE AREA: 1.62 CM2
AV VELOCITY RATIO: 0.44
BASOPHILS # BLD AUTO: 0.01 K/UL (ref 0–0.2)
BASOPHILS NFR BLD: 0.5 % (ref 0–1.9)
BILIRUB SERPL-MCNC: 1 MG/DL (ref 0.1–1)
BSA FOR ECHO PROCEDURE: 1.81 M2
BUN SERPL-MCNC: 19 MG/DL (ref 8–23)
CALCIUM SERPL-MCNC: 8.7 MG/DL (ref 8.7–10.5)
CHLORIDE SERPL-SCNC: 99 MMOL/L (ref 95–110)
CO2 SERPL-SCNC: 25 MMOL/L (ref 23–29)
CREAT SERPL-MCNC: 1.2 MG/DL (ref 0.5–1.4)
CV ECHO LV RWT: 0.35 CM
DIFFERENTIAL METHOD: ABNORMAL
DOP CALC AO PEAK VEL: 1.56 M/S
DOP CALC AO VTI: 27.1 CM
DOP CALC LVOT AREA: 3.4 CM2
DOP CALC LVOT DIAMETER: 2.09 CM
DOP CALC LVOT PEAK VEL: 0.69 M/S
DOP CALC LVOT STROKE VOLUME: 43.89 CM3
DOP CALCLVOT PEAK VEL VTI: 12.8 CM
E WAVE DECELERATION TIME: 292.93 MSEC
E/A RATIO: 0.57
E/E' RATIO: 5.4 M/S
ECHO LV POSTERIOR WALL: 1.11 CM (ref 0.6–1.1)
EJECTION FRACTION: 20 %
EOSINOPHIL # BLD AUTO: 0 K/UL (ref 0–0.5)
EOSINOPHIL NFR BLD: 0 % (ref 0–8)
ERYTHROCYTE [DISTWIDTH] IN BLOOD BY AUTOMATED COUNT: 15.1 % (ref 11.5–14.5)
EST. GFR  (NO RACE VARIABLE): >60 ML/MIN/1.73 M^2
FRACTIONAL SHORTENING: 13 % (ref 28–44)
GLUCOSE SERPL-MCNC: 132 MG/DL (ref 70–110)
HCT VFR BLD AUTO: 42.2 % (ref 40–54)
HGB BLD-MCNC: 12.6 G/DL (ref 14–18)
IMM GRANULOCYTES # BLD AUTO: 0.01 K/UL (ref 0–0.04)
IMM GRANULOCYTES NFR BLD AUTO: 0.5 % (ref 0–0.5)
INTERVENTRICULAR SEPTUM: 1.33 CM (ref 0.6–1.1)
LEFT ATRIUM SIZE: 3.06 CM
LEFT ATRIUM VOLUME INDEX MOD: 29.1 ML/M2
LEFT ATRIUM VOLUME MOD: 52.66 CM3
LEFT INTERNAL DIMENSION IN SYSTOLE: 5.52 CM (ref 2.1–4)
LEFT VENTRICLE DIASTOLIC VOLUME INDEX: 111.88 ML/M2
LEFT VENTRICLE DIASTOLIC VOLUME: 202.5 ML
LEFT VENTRICLE MASS INDEX: 193 G/M2
LEFT VENTRICLE SYSTOLIC VOLUME INDEX: 82 ML/M2
LEFT VENTRICLE SYSTOLIC VOLUME: 148.41 ML
LEFT VENTRICULAR INTERNAL DIMENSION IN DIASTOLE: 6.32 CM (ref 3.5–6)
LEFT VENTRICULAR MASS: 349.86 G
LV LATERAL E/E' RATIO: 5.4 M/S
LV SEPTAL E/E' RATIO: 5.4 M/S
LVOT MG: 1.02 MMHG
LVOT MV: 0.47 CM/S
LYMPHOCYTES # BLD AUTO: 0.3 K/UL (ref 1–4.8)
LYMPHOCYTES NFR BLD: 12.7 % (ref 18–48)
MAGNESIUM SERPL-MCNC: 2.2 MG/DL (ref 1.6–2.6)
MCH RBC QN AUTO: 23.2 PG (ref 27–31)
MCHC RBC AUTO-ENTMCNC: 29.9 G/DL (ref 32–36)
MCV RBC AUTO: 78 FL (ref 82–98)
MONOCYTES # BLD AUTO: 0.1 K/UL (ref 0.3–1)
MONOCYTES NFR BLD: 3.8 % (ref 4–15)
MV PEAK A VEL: 0.47 M/S
MV PEAK E VEL: 0.27 M/S
MV STENOSIS PRESSURE HALF TIME: 84.95 MS
MV VALVE AREA P 1/2 METHOD: 2.59 CM2
NEUTROPHILS # BLD AUTO: 1.8 K/UL (ref 1.8–7.7)
NEUTROPHILS NFR BLD: 82.5 % (ref 38–73)
NRBC BLD-RTO: 0 /100 WBC
PHOSPHATE SERPL-MCNC: 3.1 MG/DL (ref 2.7–4.5)
PISA MRMAX VEL: 3.49 M/S
PISA TR MAX VEL: 1.69 M/S
PLATELET # BLD AUTO: 255 K/UL (ref 150–450)
PMV BLD AUTO: 10.5 FL (ref 9.2–12.9)
POTASSIUM SERPL-SCNC: 4.2 MMOL/L (ref 3.5–5.1)
PROT SERPL-MCNC: 8.1 G/DL (ref 6–8.4)
PV MV: 0.37 M/S
PV PEAK VELOCITY: 0.46 CM/S
RA PRESSURE: 3 MMHG
RA VOL SYS: 56.69 ML
RBC # BLD AUTO: 5.42 M/UL (ref 4.6–6.2)
RV TISSUE DOPPLER FREE WALL SYSTOLIC VELOCITY 1 (APICAL 4 CHAMBER VIEW): 0.01 CM/S
SINUS: 3.53 CM
SODIUM SERPL-SCNC: 135 MMOL/L (ref 136–145)
STJ: 1.5 CM
TDI LATERAL: 0.05 M/S
TDI SEPTAL: 0.05 M/S
TDI: 0.05 M/S
TR MAX PG: 11 MMHG
TRICUSPID ANNULAR PLANE SYSTOLIC EXCURSION: 1.92 CM
TV REST PULMONARY ARTERY PRESSURE: 14 MMHG
WBC # BLD AUTO: 2.12 K/UL (ref 3.9–12.7)

## 2022-12-09 PROCEDURE — 84100 ASSAY OF PHOSPHORUS: CPT | Performed by: NURSE PRACTITIONER

## 2022-12-09 PROCEDURE — 63600175 PHARM REV CODE 636 W HCPCS: Performed by: NURSE PRACTITIONER

## 2022-12-09 PROCEDURE — 85025 COMPLETE CBC W/AUTO DIFF WBC: CPT | Performed by: NURSE PRACTITIONER

## 2022-12-09 PROCEDURE — 93306 ECHO (CUPID ONLY): ICD-10-PCS | Mod: 26,,, | Performed by: INTERNAL MEDICINE

## 2022-12-09 PROCEDURE — 83735 ASSAY OF MAGNESIUM: CPT | Performed by: NURSE PRACTITIONER

## 2022-12-09 PROCEDURE — 25000003 PHARM REV CODE 250: Performed by: NURSE PRACTITIONER

## 2022-12-09 PROCEDURE — 93306 TTE W/DOPPLER COMPLETE: CPT

## 2022-12-09 PROCEDURE — 25000242 PHARM REV CODE 250 ALT 637 W/ HCPCS: Performed by: NURSE PRACTITIONER

## 2022-12-09 PROCEDURE — 93306 TTE W/DOPPLER COMPLETE: CPT | Mod: 26,,, | Performed by: INTERNAL MEDICINE

## 2022-12-09 PROCEDURE — 99900035 HC TECH TIME PER 15 MIN (STAT)

## 2022-12-09 PROCEDURE — 99219 PR INITIAL OBSERVATION CARE,LEVL II: ICD-10-PCS | Mod: 25,,, | Performed by: INTERNAL MEDICINE

## 2022-12-09 PROCEDURE — 96376 TX/PRO/DX INJ SAME DRUG ADON: CPT | Mod: 59

## 2022-12-09 PROCEDURE — 99219 PR INITIAL OBSERVATION CARE,LEVL II: CPT | Mod: 25,,, | Performed by: INTERNAL MEDICINE

## 2022-12-09 PROCEDURE — 36415 COLL VENOUS BLD VENIPUNCTURE: CPT | Performed by: NURSE PRACTITIONER

## 2022-12-09 PROCEDURE — 94761 N-INVAS EAR/PLS OXIMETRY MLT: CPT

## 2022-12-09 PROCEDURE — 80053 COMPREHEN METABOLIC PANEL: CPT | Performed by: NURSE PRACTITIONER

## 2022-12-09 PROCEDURE — G0378 HOSPITAL OBSERVATION PER HR: HCPCS | Mod: CS

## 2022-12-09 RX ADMIN — SPIRONOLACTONE 25 MG: 25 TABLET ORAL at 08:12

## 2022-12-09 RX ADMIN — APIXABAN 2.5 MG: 2.5 TABLET, FILM COATED ORAL at 08:12

## 2022-12-09 RX ADMIN — FINASTERIDE 5 MG: 5 TABLET, FILM COATED ORAL at 08:12

## 2022-12-09 RX ADMIN — AMIODARONE HYDROCHLORIDE 200 MG: 200 TABLET ORAL at 08:12

## 2022-12-09 RX ADMIN — GUAIFENESIN AND DEXTROMETHORPHAN HYDROBROMIDE 1 TABLET: 600; 30 TABLET, EXTENDED RELEASE ORAL at 08:12

## 2022-12-09 RX ADMIN — FLUTICASONE PROPIONATE 100 MCG: 50 SPRAY, METERED NASAL at 08:12

## 2022-12-09 RX ADMIN — PANTOPRAZOLE SODIUM 40 MG: 40 TABLET, DELAYED RELEASE ORAL at 06:12

## 2022-12-09 RX ADMIN — FUROSEMIDE 40 MG: 10 INJECTION, SOLUTION INTRAMUSCULAR; INTRAVENOUS at 08:12

## 2022-12-09 RX ADMIN — METOPROLOL SUCCINATE 25 MG: 25 TABLET, FILM COATED, EXTENDED RELEASE ORAL at 08:12

## 2022-12-09 RX ADMIN — CLOPIDOGREL BISULFATE 75 MG: 75 TABLET, FILM COATED ORAL at 08:12

## 2022-12-09 RX ADMIN — LOSARTAN POTASSIUM 25 MG: 25 TABLET, FILM COATED ORAL at 08:12

## 2022-12-09 RX ADMIN — LEVOTHYROXINE SODIUM 112 MCG: 0.11 TABLET ORAL at 06:12
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
## 2022-12-09 NOTE — CONSULTS
"[facility] [place]  Department of Cardiology  Consult Note      PATIENT NAME: Trevor Madrid    MRN: [id number]  TODAY'S DATE: 12/09/2022  ADMIT DATE: 12/8/2022                          CONSULT REQUESTED BY: Abilio Harrison MD    SUBJECTIVE     PRINCIPAL PROBLEM: Acute on chronic heart failure      REASON FOR CONSULT:  From H&P: Trevor Madrid is a 72 y.o. male with a history as  has a past medical history of A-fib, AICD (automatic cardioverter/defibrillator) present, Arthritis, Atrial fibrillation, Cancer, Cardiomyopathy, COPD (chronic obstructive pulmonary disease), Heart failure, Histoplasmosis, Hypertension, Sarcoidosis, Sleep apnea, and Thyroid disease. who presented to the ED with a Shortness of Breath (And dizziness. X2 days. "I have some fluid on me")     Patient presented to the emergency room, with family at bedside, with the complaint of shortness of breath x2 days.  And reports not being able to sleep well because of shortness of Breath, congestion with productive cough throughout the night with laying down.  Reports using nasal spray, but not helping. Patient also reports history of sarcoid lung disease, not on home oxygen.     Denies fever, chills, diaphoresis, HA, chest pain, palpitations, NVD, recent sick contacts. No aggravating and alleviating factor.       Lab and imaging obtained and reviewed.   CBC shows WBca 3.32 H/H 11.6/38.4 MCV 78 MCH 23.7 MCHC 30.2 RDW 14.9 Lymph% 16.3. PT/INT 15.6/1.3 aPTT 41.7. CMP shows NA+ 135 AG 6 Ca+ 8.5 Alb 3.2 ALT 7.  Initial hght sensitivity troponin 23.6. EKG shows Atrial-paced rhythm with prolonged AV conduction. On admit, afebrile HR 76 RR 19 /96 sats 96% on RA.     CXR shows unchanged diffuse pulmonary opacities including superior lung zone alveolar opacities and more diffuse reticulonodular positional opacities, without new abnormality. This is most likely related to patient's known sarcoidosis, particularly given similar appearance on " 2021 PET/CT.     Per ED provider, Patient with CXR (as above), also noted to have elevated BNP and initial troponin. Given Hx of sarcoidosis and HF, Lasix, SL NTG and steroids initiated.          HPI:  Mr. Madrid is a 72 year old male who presented to the ER with complaints of dizziness and SOB. He reported feeling sob with congested cough over the past couple of days. Patient was given IV furosemide as well as solumedrol and is feeling much better today. He was seen moving around in room without any difficulty. Patient admits he has missed some doses of diuretics at home due to frequency of urination after taking the medications. Advised him that he will require diuresis lifelong and that dose adjustments should be made by his doctors. He is [facility].        Review of patient's allergies indicates:  No Known Allergies    Past Medical History:   Diagnosis Date    A-fib     AICD (automatic cardioverter/defibrillator) present     Arthritis     Atrial fibrillation     Cancer     Cardiomyopathy     COPD (chronic obstructive pulmonary disease)     Heart failure     Histoplasmosis     Hypertension     Sarcoidosis     Sleep apnea     Thyroid disease      Past Surgical History:   Procedure Laterality Date    CARDIAC CATHETERIZATION      CARDIAC DEFIBRILLATOR PLACEMENT Left 2020    CORONARY ANGIOGRAPHY Left 2019    Procedure: CATHETERIZATION, HEART, LEFT (RIGHT RADIAL);  Surgeon: Gerald Romero MD;  Location: [facility] CATH/EP LAB;  Service: Cardiology;  Laterality: Left;    CORONARY STENT PLACEMENT      INSERTION OF BIVENTRICULAR IMPLANTABLE CARDIOVERTER-DEFIBRILLATOR (ICD)      TOTAL THYROIDECTOMY       Social History     Tobacco Use    Smoking status: Former     Types: Cigarettes     Quit date: 2014     Years since quittin.2    Smokeless tobacco: Former   Substance Use Topics    Alcohol use: Not Currently    Drug use: Never        REVIEW OF SYSTEMS  CONSTITUTIONAL: Negative  for chills, fatigue and fever.   EYES: No double vision, No blurred vision  NEURO: No headaches, No dizziness  RESPIRATORY: + for cough, +shortness of breath: resolved  CARDIOVASCULAR: Negative for chest pain. +leg swelling: resolved  GI: Negative for abdominal pain, No melena, diarrhea, nausea and vomiting.   : Negative for dysuria and frequency, Negative for hematuria  SKIN: Negative for bruising, Negative for edema or discoloration noted.   PSYCHIATRIC: Negative for depression, Negative for anxiety, Negative for memory loss  MUSCULOSKELETAL: Negative for neck pain, Negative for muscle weakness, Negative for back pain     OBJECTIVE     VITAL SIGNS (Most Recent)  Temp: 97.4 °F (36.3 °C) (12/09/22 1100)  Pulse: 81 (12/09/22 1453)  Resp: 18 (12/09/22 1453)  BP: (!) 121/91 (12/09/22 1100)  SpO2: (!) 93 % (12/09/22 1453)    VENTILATION STATUS  Resp: 18 (12/09/22 1453)  SpO2: (!) 93 % (12/09/22 1453)       I & O (Last 24H):  Intake/Output Summary (Last 24 hours) at 12/9/2022 1529  Last data filed at 12/9/2022 1245  Gross per 24 hour   Intake 720 ml   Output 6050 ml   Net -5330 ml       WEIGHTS  Wt Readings from Last 1 Encounters:   12/08/22 2141 68 kg (150 lb)   12/08/22 1333 68 kg (150 lb)       PHYSICAL EXAM  CONSTITUTIONAL: No fever, no chills  HEENT: Normocephalic, atraumatic,pupils reactive to light                 NECK:  No JVD no carotid bruit  CVS: S1S2+, RRR  LUNGS: Clear  ABDOMEN: Soft, NT, BS+  EXTREMITIES: No cyanosis, edema  : No hannon catheter  NEURO: AAO X 3  PSY: Normal affect      HOME MEDICATIONS:  No current facility-administered medications on file prior to encounter.     Current Outpatient Medications on File Prior to Encounter   Medication Sig Dispense Refill    acetaminophen (TYLENOL) 500 MG tablet Take 500 mg by mouth every 6 (six) hours as needed for Pain.      amiodarone (PACERONE) 200 MG Tab Take 1 tablet (200 mg total) by mouth once daily. 30 tablet 11    apixaban (ELIQUIS) 2.5 mg Tab  Take 2.5 mg by mouth 2 (two) times daily.      clopidogreL (PLAVIX) 75 mg tablet Take 1 tablet (75 mg total) by mouth once daily. 90 tablet 3    finasteride (PROSCAR) 5 mg tablet Take 5 mg by mouth once daily.      furosemide (LASIX) 20 MG tablet Take 1 tablet (20 mg total) by mouth once daily. 30 tablet 3    leflunomide (ARAVA) 20 MG Tab Take 20 mg by mouth once daily.      levothyroxine (SYNTHROID) 112 MCG tablet Take 1 tablet (112 mcg total) by mouth before breakfast. Take on empty stomach an hour before another other medications. 90 tablet 3    loratadine (CLARITIN) 10 mg tablet Take 10 mg by mouth once daily.      losartan (COZAAR) 25 MG tablet Take 25 mg by mouth once daily.      methotrexate 2.5 MG Tab Take 2.5 mg by mouth every 7 days.      metoprolol succinate (TOPROL-XL) 25 MG 24 hr tablet Take 25 mg by mouth once daily.       pantoprazole (PROTONIX) 40 MG tablet Take 40 mg by mouth once daily.      potassium chloride (KLOR-CON) 20 mEq Pack Take 20 mEq by mouth once.      prednisoLONE acetate (PRED FORTE) 1 % DrpS 1 drop 4 (four) times daily as needed.      spironolactone (ALDACTONE) 25 MG tablet TAKE 1 TABLET EVERY DAY 90 tablet 3       SCHEDULED MEDS:   amiodarone  200 mg Oral Daily    apixaban  2.5 mg Oral BID    clopidogreL  75 mg Oral Daily    dextromethorphan-guaiFENesin  mg  1 tablet Oral BID    finasteride  5 mg Oral Daily    fluticasone propionate  2 spray Each Nostril Daily    furosemide (LASIX) injection  40 mg Intravenous BID    leflunomide  20 mg Oral Daily    levothyroxine  112 mcg Oral Before breakfast    losartan  25 mg Oral Daily    metoprolol succinate  25 mg Oral Daily    pantoprazole  40 mg Oral Before breakfast    spironolactone  25 mg Oral Daily       CONTINUOUS INFUSIONS:    PRN MEDS:acetaminophen, albuterol-ipratropium, dextrose 10%, dextrose 10%, glucagon (human recombinant), glucose, glucose, magnesium oxide, magnesium oxide, melatonin, naloxone, ondansetron, potassium  bicarbonate, potassium bicarbonate, potassium bicarbonate, potassium, sodium phosphates, potassium, sodium phosphates, potassium, sodium phosphates, sodium chloride 0.9%, sodium chloride 0.9%    LABS AND DIAGNOSTICS     CBC LAST 3 DAYS  Recent Labs   Lab 12/08/22  1355 12/09/22  0409   WBC 3.32* 2.12*   RBC 4.90 5.42   HGB 11.6* 12.6*   HCT 38.4* 42.2   MCV 78* 78*   MCH 23.7* 23.2*   MCHC 30.2* 29.9*   RDW 14.9* 15.1*    255   MPV 11.6 10.5   GRAN 66.2  2.2 82.5*  1.8   LYMPH 16.3*  0.5* 12.7*  0.3*   MONO 13.6  0.5 3.8*  0.1*   BASO 0.03 0.01   NRBC 0 0       COAGULATION LAST 3 DAYS  Recent Labs   Lab 12/08/22  1355   LABPT 15.6*   INR 1.3   APTT 41.7*       CHEMISTRY LAST 3 DAYS  Recent Labs   Lab 12/08/22  1355 12/09/22  0409   * 135*   K 3.9 4.2    99   CO2 26 25   ANIONGAP 6* 11   BUN 12 19   CREATININE 0.9 1.2   GLU 85 132*   CALCIUM 8.5* 8.7   MG 2.1 2.2   ALBUMIN 3.2* 3.6   PROT 7.0 8.1   ALKPHOS 67 73   ALT 7* 11   AST 15 15   BILITOT 0.9 1.0       CARDIAC PROFILE LAST 3 DAYS  Recent Labs   Lab 12/08/22  1355 12/08/22  1743   *  --    TROPONINIHS 23.6* 21.0*       ENDOCRINE LAST 3 DAYS  Recent Labs   Lab 12/08/22  1355   TSH 2.670       LAST ARTERIAL BLOOD GAS  ABG  No results for input(s): PH, PO2, PCO2, HCO3, BE in the last 168 hours.    LAST 7 DAYS MICROBIOLOGY   Microbiology Results (last 7 days)       ** No results found for the last 168 hours. **            MOST RECENT IMAGING  Echo  · The left ventricle is mildly enlarged with mild concentric hypertrophy   and severely decreased systolic function.  · The estimated ejection fraction is 20%.  · Grade I left ventricular diastolic dysfunction.  · There is severe left ventricular global hypokinesis.  · Normal right ventricular size with normal right ventricular systolic   function.  · Mild tricuspid regurgitation.  · Moderate-to-severe aortic regurgitation.  · Mild mitral regurgitation.  · Normal central venous pressure (3  mmHg).  · The estimated PA systolic pressure is 14 mmHg.  · There is no pulmonary hypertension.         ECHOCARDIOGRAM RESULTS (last 5)  No results found for this or any previous visit.      CURRENT/PREVIOUS VISIT EKG  Results for orders placed or performed during the hospital encounter of 12/08/22   EKG 12-lead    Collection Time: 12/08/22  2:27 PM    Narrative    Test Reason : R06.02,    Vent. Rate : 081 BPM     Atrial Rate : 081 BPM     P-R Int : 238 ms          QRS Dur : 104 ms      QT Int : 418 ms       P-R-T Axes : -21 -01 205 degrees     QTc Int : 485 ms    Atrial-paced rhythm with prolonged AV conduction  T wave abnormality, consider lateral ischemia  Prolonged QT  Abnormal ECG  When compared with ECG of 23-NOV-2021 09:52,  T wave inversion no longer evident in Inferior leads    Referred By: [email or web address]   SELF           Confirmed By:            ASSESSMENT/PLAN:     Active Hospital Problems    Diagnosis    *Acute on chronic heart failure    H/O thyroidectomy    Thyroid disease    ICD (implantable cardioverter-defibrillator), dual, in situ    Hypertension    Dyslipidemia    Paroxysmal atrial fibrillation    Sarcoidosis    Chronic leukopenia       ASSESSMENT & PLAN:     Acute on chronic systolic HF  Moderate to severe AI  ICD insitu  PAF  CAD with hx of PCI      RECOMMENDATIONS:    Patient appears stable and ready for Dc home. Resume furosemide 20 mg po daily and spironolactone 25 mg po daily. Recommend compliance with daily dosing.   Echocardiogram shows EF 20% with moderate to severe AR. Muga from 2019 showed EF about 30%. He does have ICD in place.   Continue amiodarone 200 mg po daily, Eliquis 2.5 mg po BID, Plavix 75 mg po daily, losartan 25 mg po daily, and metoprolol succinate 25 mg po daily.   Can dc home and follow up with his primary cardiologist in 1-2 weeks.         Radha Rodriguez NP  [facility]  Department of Cardiology  Date of Service: 12/09/2022  3:24 PM     I have personally  interviewed and examined the patient, I have reviewed the Nurse Practitioner's history and physical, assessment, and plan. I have personally evaluated the patient at bedside and agree with the findings and made appropriate changes as necessary in recommendations.    Nathaniel West MD  Department of Cardiology  [facility]  12/09/2022 3:34 PM

## 2022-12-09 NOTE — PLAN OF CARE
12/09/22 1723   MTZ Message   Medicare Outpatient and Observation Notification regarding financial responsibility Given to patient/caregiver;Explained to patient/caregiver;Signed/date by patient/caregiver   Date MTZ was signed 12/09/22   Time MTZ was signed 1518

## 2022-12-09 NOTE — DISCHARGE SUMMARY
"[facility]  Discharge Summary      Patient Name: Trevor Madrid  MRN: [id number]  BRYAN: [id number]  Patient Class: OP- Observation  Admission Date: 12/8/2022  Hospital Length of Stay: 0 days  Discharge Date and Time:  12/09/2022 4:25 PM  Attending Physician: Abilio Harrison MD   Discharging Provider: Abilio Harrison MD  Primary Care Provider: Primary Doctor Shelly    Primary Care Team: Networked reference to record PCT     HPI:   Trevor Madrid is a 72 y.o. male with a history as  has a past medical history of A-fib, AICD (automatic cardioverter/defibrillator) present, Arthritis, Atrial fibrillation, Cancer, Cardiomyopathy, COPD (chronic obstructive pulmonary disease), Heart failure, Histoplasmosis, Hypertension, Sarcoidosis, Sleep apnea, and Thyroid disease. who presented to the ED with a Shortness of Breath (And dizziness. X2 days. "I have some fluid on me")    Patient presented to the emergency room, with family at bedside, with the complaint of shortness of breath x2 days.  And reports not being able to sleep well because of shortness of Breath, congestion with productive cough throughout the night with laying down.  Reports using nasal spray, but not helping. Patient also reports history of sarcoid lung disease, not on home oxygen is followed by pulmonology at [facility]. He also reports Hx of a-fib with ICD placed, followed by Dr. LINDA West. He denies any recent palpitations, irregular heart beats or chest pain.     Denies fever, chills, diaphoresis, HA, chest pain, palpitations, NVD, recent sick contacts. No aggravating and alleviating factor.      Lab and imaging obtained and reviewed.   CBC shows WBca 3.32 H/H 11.6/38.4 MCV 78 MCH 23.7 MCHC 30.2 RDW 14.9 Lymph% 16.3. PT/INT 15.6/1.3 aPTT 41.7. CMP shows NA+ 135 AG 6 Ca+ 8.5 Alb 3.2 ALT 7.  Initial hght sensitivity troponin 23.6. EKG shows Atrial-paced rhythm with prolonged AV conduction. On admit, afebrile HR 76 RR 19 BP " 136/96 sats 96% on RA.    CXR shows unchanged diffuse pulmonary opacities including superior lung zone alveolar opacities and more diffuse reticulonodular positional opacities, without new abnormality. This is most likely related to patient's known sarcoidosis, particularly given similar appearance on 9/28/2021 PET/CT.    Per ED provider, Patient with CXR (as above), also noted to have elevated BNP and initial troponin. Given Hx of sarcoidosis and HF, Lasix, SL NTG and steroids initiated. Will admit for A/C HF with IV diuresing and cardiology consult appreciated.          Hospital Course by Problem:   72-year-old male with history as above presented to the hospital with complaints of shortness of shortness of Breath x2 days.  Noted to have elevated BNP and initial high sensitivity troponin.  The patient was admitted for     Acute on chronic systolic CHF exacerbation  Aortic insufficiency   Paroxysmal atrial fibrillation   CAD     Plan:  Admit to observation - telemetry for A/C HF  Continuous cardiac monitoring  Continue to trend serial troponins q4 hours X 2 likely elevated 2/2 CHF  Continue IV diuretics for now - TRANSITIONED TO ORAL DIURETICS  Cardiac diet with fluid restriction - DISCUSSED FLUID RESTRICTION  Strict I/Os and daily weights - DAILY WEIGHTS AT HOME  Echo - REVIEWED  Cardiology consult appreciated - APPRECIATE CARDIOLOGY CONSULTATION CALLED DISCUSSED PLAN TODAY  Daily labs  Electrolytes sliding scale repletion - LABS STABLE  Continue chronic home medications - STABLE FOR DISCHARGE HOME TODAY WITH ORAL MEDICAL REGIMEN  Further plan as per clinical course - THE PATIENT IS MUCH IMPROVED AND STABLE FOR DISCHARGE HOME WITH OUTPATIENT CARDIOLOGY FOLLOW-UP    DC EXAM  Patient seen and examined today.  He looks comfortable nontoxic appearing.  Comfortable work of breathing with clear lungs bilaterally.  2+ radial pulses.  No pedal edema of significance.  Patient is alert and oriented, fluent speech, and  appears comfortable.    Consults (From admission, onward)          Status Ordering Provider     Inpatient consult to Cardiology  Once        Provider:  Nathaniel West MD    Completed MARGO AVINA     Inpatient consult to Hospitalist  Once        Provider:  EVE Arana    Acknowledged MARGO AVINA            No new Assessment & Plan notes have been filed under this hospital service since the last note was generated.  Service: Hospital Medicine    Final Active Diagnoses:    Diagnosis Date Noted POA    PRINCIPAL PROBLEM:  Acute on chronic heart failure [I50.9] 01/29/2020 Yes    H/O thyroidectomy [E89.0] 12/08/2022 Yes    Thyroid disease [E07.9] 12/08/2022 Yes    ICD (implantable cardioverter-defibrillator), dual, in situ [Z95.810] 05/10/2021 Yes    Hypertension [I10] 05/10/2021 Yes    Dyslipidemia [E78.5] 05/10/2021 Yes    Paroxysmal atrial fibrillation [I48.0] 05/25/2020 Yes    Sarcoidosis [D86.9] 05/24/2020 Yes    Chronic leukopenia [D72.819] 05/24/2020 Yes      Problems Resolved During this Admission:       Discharged Condition: stable    Disposition: Home or Self Care    Follow Up:   Follow-up Information       cardiologist Follow up in 2 week(s).               Primary Doctor No Follow up.                           Patient Instructions:      Diet Cardiac   Order Comments: Fluid restrict 1.5L/day     Notify your health care provider if you experience any of the following:  difficulty breathing or increased cough     Notify your health care provider if you experience any of the following:  persistent dizziness, light-headedness, or visual disturbances     Notify your health care provider if you experience any of the following:  severe uncontrolled pain     Activity as tolerated       Pending Diagnostic Studies:       None           Medications:  Reconciled Home Medications:      Medication List        CONTINUE taking these medications      acetaminophen 500 MG tablet  Commonly known as:  TYLENOL  Take 500 mg by mouth every 6 (six) hours as needed for Pain.     amiodarone 200 MG Tab  Commonly known as: PACERONE  Take 1 tablet (200 mg total) by mouth once daily.     apixaban 2.5 mg Tab  Commonly known as: ELIQUIS  Take 2.5 mg by mouth 2 (two) times daily.     clopidogreL 75 mg tablet  Commonly known as: PLAVIX  Take 1 tablet (75 mg total) by mouth once daily.     finasteride 5 mg tablet  Commonly known as: PROSCAR  Take 5 mg by mouth once daily.     furosemide 20 MG tablet  Commonly known as: LASIX  Take 1 tablet (20 mg total) by mouth once daily.     leflunomide 20 MG Tab  Commonly known as: ARAVA  Take 20 mg by mouth once daily.     levothyroxine 112 MCG tablet  Commonly known as: SYNTHROID  Take 1 tablet (112 mcg total) by mouth before breakfast. Take on empty stomach an hour before another other medications.     loratadine 10 mg tablet  Commonly known as: CLARITIN  Take 10 mg by mouth once daily.     losartan 25 MG tablet  Commonly known as: COZAAR  Take 25 mg by mouth once daily.     methotrexate 2.5 MG Tab  Take 2.5 mg by mouth every 7 days.     metoprolol succinate 25 MG 24 hr tablet  Commonly known as: TOPROL-XL  Take 25 mg by mouth once daily.     pantoprazole 40 MG tablet  Commonly known as: PROTONIX  Take 40 mg by mouth once daily.     potassium chloride 20 mEq Pack  Commonly known as: KLOR-CON  Take 20 mEq by mouth once.     prednisoLONE acetate 1 % Drps  Commonly known as: PRED FORTE  1 drop 4 (four) times daily as needed.     spironolactone 25 MG tablet  Commonly known as: ALDACTONE  TAKE 1 TABLET EVERY DAY              Indwelling Lines/Drains at time of discharge:   Lines/Drains/Airways       None                   Time spent on the discharge of patient: 32 minutes         Abilio Harrison MD  Department of Hospital Medicine  [facility]

## 2022-12-09 NOTE — PLAN OF CARE
Count includes the [facility]  Initial Discharge Assessment       Primary Care Provider: Primary Doctor No    Admission Diagnosis: Acute on chronic systolic congestive heart failure [I50.23]    Admission Date: 12/8/2022  Expected Discharge Date: 12/12/2022    Discharge Barriers Identified: None    Initial assessment completed at bedside with patient.    Payor: HU[employer] MANAGED MEDICARE / Plan: HU[employer]NA SNP (SPECIAL NEEDS PLAN) / Product Type: Medicare Advantage /     Extended Emergency Contact Information  Primary Emergency Contact: Kate Mitchell  Mobile Phone: [phone]  Relation: Other  Preferred language: English   needed? No  Secondary Emergency Contact: Hayden Josysarkis Morris  Mobile Phone: [phone]  Relation: Relative  Preferred language: English   needed? No    Discharge Plan A: Home  Discharge Plan B: Home      [facility] - [place], [place] - [address]  [address]  [place] [address]  Phone: [phone] Fax: [phone]    [facility] - [facility] [address] [facility]  [address] [facility] [address]  Phone: [phone] Fax: [phone]      Initial Assessment (most recent)       Adult Discharge Assessment - 12/09/22 1504          Discharge Assessment    Assessment Type Discharge Planning Assessment     Confirmed/corrected address, phone number and insurance Yes     Confirmed Demographics Correct on Facesheet     When was your last doctors appointment? --   Sept 2021    Does patient/caregiver understand observation status Yes     Reason For Admission CHF     People in Home alone     Facility Arrived From: home     Do you expect to return to your current living situation? Yes     Do you have help at home or someone to help you manage your care at home? No     Prior to hospitilization cognitive status: Alert/Oriented     Current cognitive status: Alert/Oriented     Equipment Currently Used at Home none     Readmission within 30 days? No      Patient currently being followed by outpatient case management? No     Do you currently have service(s) that help you manage your care at home? No     Do you take prescription medications? Yes     Do you have prescription coverage? Yes     Coverage [employer]     Do you have any problems affording any of your prescribed medications? No     Is the patient taking medications as prescribed? yes     Who is going to help you get home at discharge? Josy (relative) [phone]     How do you get to doctors appointments? car, drives self     Are you on dialysis? No     Do you take coumadin? No     Discharge Plan A Home     Discharge Plan B Home     DME Needed Upon Discharge  none     Discharge Plan discussed with: Patient     Discharge Barriers Identified None        Physical Activity    On average, how many days per week do you engage in moderate to strenuous exercise (like a brisk walk)? 0 days     On average, how many minutes do you engage in exercise at this level? 0 min        Financial Resource Strain    How hard is it for you to pay for the very basics like food, housing, medical care, and heating? Not very hard        Housing Stability    In the last 12 months, was there a time when you were not able to pay the mortgage or rent on time? No     In the last 12 months, how many places have you lived? 1     In the last 12 months, was there a time when you did not have a steady place to sleep or slept in a shelter (including now)? No        Transportation Needs    In the past 12 months, has lack of transportation kept you from medical appointments or from getting medications? No     In the past 12 months, has lack of transportation kept you from meetings, work, or from getting things needed for daily living? No        Food Insecurity    Within the past 12 months, you worried that your food would run out before you got the money to buy more. Never true     Within the past 12 months, the food you bought just didn't last and  you didn't have money to get more. Never true        Stress    Do you feel stress - tense, restless, nervous, or anxious, or unable to sleep at night because your mind is troubled all the time - these days? Only a little        Social Connections    In a typical week, how many times do you talk on the phone with family, friends, or neighbors? More than three times a week     How often do you get together with friends or relatives? Once a week     How often do you attend Hoahaoism or Uatsdin services? 1 to 4 times per year     Do you belong to any clubs or organizations such as Hoahaoism groups, unions, fraternal or athletic groups, or school groups? No     How often do you attend meetings of the clubs or organizations you belong to? Never     Are you , , , , never , or living with a partner?         Alcohol Use    Q1: How often do you have a drink containing alcohol? Never     Q2: How many drinks containing alcohol do you have on a typical day when you are drinking? Patient does not drink     Q3: How often do you have six or more drinks on one occasion? Never

## 2022-12-09 NOTE — NURSING
Patient arrived to unit via stretcher. AAOx4 oriented to room, ambulated in room. Call light within reach, bed height lowest to the floor. Patient declined bed alarm, educated pt on the safety need and precautions of the bed alarm, pt continues to decline. Pt is resting comfortably and does not voice any concerns or needs at this time.

## 2022-12-09 NOTE — PLAN OF CARE
Discharge orders and chart reviewed with no further post-acute discharge needs identified at this time.  At this time, patient is cleared for discharge from Case Management standpoint.        12/09/22 1630   Final Note   Assessment Type Final Discharge Note   Anticipated Discharge Disposition Home   Hospital Resources/Appts/Education Provided   (Patient instructed to make post hospital follow up appointment with their PCP in 7 to 10 days from discharge)   Post-Acute Status   Coverage [employer]   Discharge Delays None known at this time

## 2022-12-09 NOTE — PLAN OF CARE
12/09/22 1453   Patient Assessment/Suction   Level of Consciousness (AVPU) alert   Respiratory Effort Unlabored   All Lung Fields Breath Sounds clear;diminished   PRE-TX-O2   Device (Oxygen Therapy) room air   SpO2 (!) 93 %   Pulse Oximetry Type Intermittent   $ Pulse Oximetry - Multiple Charge Pulse Oximetry - Multiple   Pulse 81   Resp 18   Respiratory Evaluation   $ Care Plan Tech Time 15 min

## 2022-12-12 ENCOUNTER — TELEPHONE (OUTPATIENT)
Dept: FAMILY MEDICINE | Facility: CLINIC | Age: 72
End: 2022-12-12

## 2023-02-06 ENCOUNTER — TELEPHONE (OUTPATIENT)
Dept: CARDIOLOGY | Facility: CLINIC | Age: 73
End: 2023-02-06
Payer: MEDICARE

## 2023-02-06 DIAGNOSIS — I50.23 ACUTE ON CHRONIC SYSTOLIC HEART FAILURE: ICD-10-CM

## 2023-02-06 DIAGNOSIS — D86.85 SARCOID MYOCARDITIS: Primary | ICD-10-CM

## 2023-02-06 NOTE — TELEPHONE ENCOUNTER
----- Message from Raymon Hassan sent at 2/6/2023 12:56 PM CST -----  Regarding: Advice  Contact: Patient  Type:  Needs Medical Advice    Who Called: pateint  Symptoms (please be specific): Heart Monitor   How long has patient had these symptoms:    Pharmacy name and phone #:    Would the patient rather a call back or a response via [facility]? call  Best Call Back Number: [phone]  Additional Information: Patient called regarding his heart monitor. Patient stated it keeps flashing colors. Please call patient to advice.

## 2023-02-07 ENCOUNTER — TELEPHONE (OUTPATIENT)
Dept: CARDIOLOGY | Facility: CLINIC | Age: 73
End: 2023-02-07
Payer: MEDICARE

## 2023-02-07 NOTE — TELEPHONE ENCOUNTER
Patient states remote monitor lighting up all night long, I told him I don't know why it's doing that and gave him the number to stay connected with [employer].

## 2023-03-21 ENCOUNTER — TELEPHONE (OUTPATIENT)
Dept: CARDIOLOGY | Facility: CLINIC | Age: 73
End: 2023-03-21
Payer: MEDICARE

## 2023-03-21 DIAGNOSIS — Z95.810 ICD (IMPLANTABLE CARDIOVERTER-DEFIBRILLATOR), DUAL, IN SITU: Primary | ICD-10-CM

## 2023-03-28 ENCOUNTER — OFFICE VISIT (OUTPATIENT)
Dept: CARDIOLOGY | Facility: CLINIC | Age: 73
End: 2023-03-28
Payer: MEDICARE

## 2023-03-28 VITALS
RESPIRATION RATE: 16 BRPM | SYSTOLIC BLOOD PRESSURE: 116 MMHG | OXYGEN SATURATION: 98 % | DIASTOLIC BLOOD PRESSURE: 74 MMHG | HEART RATE: 68 BPM | BODY MASS INDEX: 22.43 KG/M2 | WEIGHT: 148 LBS | HEIGHT: 68 IN

## 2023-03-28 DIAGNOSIS — Z95.810 ICD (IMPLANTABLE CARDIOVERTER-DEFIBRILLATOR), DUAL, IN SITU: ICD-10-CM

## 2023-03-28 DIAGNOSIS — E78.5 DYSLIPIDEMIA: ICD-10-CM

## 2023-03-28 DIAGNOSIS — E07.9 THYROID DISEASE: ICD-10-CM

## 2023-03-28 DIAGNOSIS — D86.85 SARCOID MYOCARDITIS: ICD-10-CM

## 2023-03-28 DIAGNOSIS — I48.0 PAROXYSMAL ATRIAL FIBRILLATION: ICD-10-CM

## 2023-03-28 PROCEDURE — 1126F PR PAIN SEVERITY QUANTIFIED, NO PAIN PRESENT: ICD-10-PCS | Mod: CPTII,S$GLB,, | Performed by: INTERNAL MEDICINE

## 2023-03-28 PROCEDURE — 1126F AMNT PAIN NOTED NONE PRSNT: CPT | Mod: CPTII,S$GLB,, | Performed by: INTERNAL MEDICINE

## 2023-03-28 PROCEDURE — 1159F MED LIST DOCD IN RCRD: CPT | Mod: CPTII,S$GLB,, | Performed by: INTERNAL MEDICINE

## 2023-03-28 PROCEDURE — 3288F PR FALLS RISK ASSESSMENT DOCUMENTED: ICD-10-PCS | Mod: CPTII,S$GLB,, | Performed by: INTERNAL MEDICINE

## 2023-03-28 PROCEDURE — 3074F SYST BP LT 130 MM HG: CPT | Mod: CPTII,S$GLB,, | Performed by: INTERNAL MEDICINE

## 2023-03-28 PROCEDURE — 99999 PR PBB SHADOW E&M-EST. PATIENT-LVL IV: ICD-10-PCS | Mod: PBBFAC,,, | Performed by: INTERNAL MEDICINE

## 2023-03-28 PROCEDURE — 3074F PR MOST RECENT SYSTOLIC BLOOD PRESSURE < 130 MM HG: ICD-10-PCS | Mod: CPTII,S$GLB,, | Performed by: INTERNAL MEDICINE

## 2023-03-28 PROCEDURE — 3008F BODY MASS INDEX DOCD: CPT | Mod: CPTII,S$GLB,, | Performed by: INTERNAL MEDICINE

## 2023-03-28 PROCEDURE — 3288F FALL RISK ASSESSMENT DOCD: CPT | Mod: CPTII,S$GLB,, | Performed by: INTERNAL MEDICINE

## 2023-03-28 PROCEDURE — 3078F DIAST BP <80 MM HG: CPT | Mod: CPTII,S$GLB,, | Performed by: INTERNAL MEDICINE

## 2023-03-28 PROCEDURE — 99999 PR PBB SHADOW E&M-EST. PATIENT-LVL IV: CPT | Mod: PBBFAC,,, | Performed by: INTERNAL MEDICINE

## 2023-03-28 PROCEDURE — 99214 OFFICE O/P EST MOD 30 MIN: CPT | Mod: S$GLB,,, | Performed by: INTERNAL MEDICINE

## 2023-03-28 PROCEDURE — 3078F PR MOST RECENT DIASTOLIC BLOOD PRESSURE < 80 MM HG: ICD-10-PCS | Mod: CPTII,S$GLB,, | Performed by: INTERNAL MEDICINE

## 2023-03-28 PROCEDURE — 1160F PR REVIEW ALL MEDS BY PRESCRIBER/CLIN PHARMACIST DOCUMENTED: ICD-10-PCS | Mod: CPTII,S$GLB,, | Performed by: INTERNAL MEDICINE

## 2023-03-28 PROCEDURE — 3008F PR BODY MASS INDEX (BMI) DOCUMENTED: ICD-10-PCS | Mod: CPTII,S$GLB,, | Performed by: INTERNAL MEDICINE

## 2023-03-28 PROCEDURE — 1101F PR PT FALLS ASSESS DOC 0-1 FALLS W/OUT INJ PAST YR: ICD-10-PCS | Mod: CPTII,S$GLB,, | Performed by: INTERNAL MEDICINE

## 2023-03-28 PROCEDURE — 1159F PR MEDICATION LIST DOCUMENTED IN MEDICAL RECORD: ICD-10-PCS | Mod: CPTII,S$GLB,, | Performed by: INTERNAL MEDICINE

## 2023-03-28 PROCEDURE — 99214 PR OFFICE/OUTPT VISIT, EST, LEVL IV, 30-39 MIN: ICD-10-PCS | Mod: S$GLB,,, | Performed by: INTERNAL MEDICINE

## 2023-03-28 PROCEDURE — 1160F RVW MEDS BY RX/DR IN RCRD: CPT | Mod: CPTII,S$GLB,, | Performed by: INTERNAL MEDICINE

## 2023-03-28 PROCEDURE — 1101F PT FALLS ASSESS-DOCD LE1/YR: CPT | Mod: CPTII,S$GLB,, | Performed by: INTERNAL MEDICINE

## 2023-03-28 RX ORDER — ATORVASTATIN CALCIUM 40 MG/1
40 TABLET, FILM COATED ORAL DAILY
Qty: 90 TABLET | Refills: 3 | Status: SHIPPED | OUTPATIENT
Start: 2023-03-28 | End: 2024-03-27

## 2023-03-28 NOTE — ASSESSMENT & PLAN NOTE
ICD interrogation noted no shock therapies delivered and maintaining stable rhythm.  Details as in history

## 2023-03-28 NOTE — PROGRESS NOTES
Subjective:    Patient ID:  Trevor Madrid is a 73 y.o. male patient here for evaluation Follow-up      History of Present Illness:   Patient is a 73-year-old gentleman with history of paroxysmal atrial fibrillation sarcoidosis congestive heart failure cardiomyopathy is seeking follow-up evaluation.  He has ICD interrogation on 2023 he has 6.8 years to RRT.  He has no discharges /shocks therapy from the defibrillator noted.  His a paced V paced only 3% of the time a paced and V sensed 93.2% of the time.  No significant atrial fibrillation is noted during this time.  Clinically patient denies having chest discomfort no shortness of breath no swelling in the legs.  Effort capacity is good      Review of patient's allergies indicates:  No Known Allergies    Past Medical History:   Diagnosis Date    A-fib     AICD (automatic cardioverter/defibrillator) present     Arthritis     Atrial fibrillation     Cancer     Cardiomyopathy     COPD (chronic obstructive pulmonary disease)     Heart failure     Histoplasmosis     Hypertension     Sarcoidosis     Sleep apnea     Thyroid disease      Past Surgical History:   Procedure Laterality Date    CARDIAC CATHETERIZATION      CARDIAC DEFIBRILLATOR PLACEMENT Left 2020    CORONARY ANGIOGRAPHY Left 2019    Procedure: CATHETERIZATION, HEART, LEFT (RIGHT RADIAL);  Surgeon: Gerald Romero MD;  Location: [facility] CATH/EP LAB;  Service: Cardiology;  Laterality: Left;    CORONARY STENT PLACEMENT      INSERTION OF BIVENTRICULAR IMPLANTABLE CARDIOVERTER-DEFIBRILLATOR (ICD)      TOTAL THYROIDECTOMY       Social History     Tobacco Use    Smoking status: Former     Types: Cigarettes     Quit date: 2014     Years since quittin.5    Smokeless tobacco: Former   Substance Use Topics    Alcohol use: Not Currently    Drug use: Never        Review of Systems:    As noted in HPI in addition      REVIEW OF SYSTEMS  CARDIOVASCULAR: No recent chest pain, palpitations, arm,  neck, or jaw pain  RESPIRATORY: No recent fever, cough chills, SOB or congestion  : No blood in the urine  GI: No Nausea, vomiting, constipation, diarrhea, blood, or reflux.  MUSCULOSKELETAL: No myalgias  NEURO: No lightheadedness or dizziness  EYES: No Double vision, blurry, vision or headache              Objective        Vitals:    03/28/23 0911   BP: 116/74   Pulse: 68   Resp: 16       LIPIDS - LAST 2   Lab Results   Component Value Date    CHOL 180 10/20/2022    HDL 44 10/20/2022    LDLCALC 125.2 10/20/2022    TRIG 54 10/20/2022    CHOLHDL 24.4 10/20/2022       CBC - LAST 2  Lab Results   Component Value Date    WBC 2.12 (L) 12/09/2022    WBC 3.32 (L) 12/08/2022    RBC 5.42 12/09/2022    RBC 4.90 12/08/2022    HGB 12.6 (L) 12/09/2022    HGB 11.6 (L) 12/08/2022    HCT 42.2 12/09/2022    HCT 38.4 (L) 12/08/2022    MCV 78 (L) 12/09/2022    MCV 78 (L) 12/08/2022    MCH 23.2 (L) 12/09/2022    MCH 23.7 (L) 12/08/2022    MCHC 29.9 (L) 12/09/2022    MCHC 30.2 (L) 12/08/2022    RDW 15.1 (H) 12/09/2022    RDW 14.9 (H) 12/08/2022     12/09/2022     12/08/2022    MPV 10.5 12/09/2022    MPV 11.6 12/08/2022    GRAN 1.8 12/09/2022    GRAN 82.5 (H) 12/09/2022    LYMPH 0.3 (L) 12/09/2022    LYMPH 12.7 (L) 12/09/2022    MONO 0.1 (L) 12/09/2022    MONO 3.8 (L) 12/09/2022    BASO 0.01 12/09/2022    BASO 0.03 12/08/2022    NRBC 0 12/09/2022    NRBC 0 12/08/2022       CHEMISTRY & LIVER FUNCTION - LAST 2  Lab Results   Component Value Date     (L) 12/09/2022     (L) 12/08/2022    K 4.2 12/09/2022    K 3.9 12/08/2022    CL 99 12/09/2022     12/08/2022    CO2 25 12/09/2022    CO2 26 12/08/2022    ANIONGAP 11 12/09/2022    ANIONGAP 6 (L) 12/08/2022    BUN 19 12/09/2022    BUN 12 12/08/2022    CREATININE 1.2 12/09/2022    CREATININE 0.9 12/08/2022     (H) 12/09/2022    GLU 85 12/08/2022    CALCIUM 8.7 12/09/2022    CALCIUM 8.5 (L) 12/08/2022    PH 7.422 05/24/2020    MG 2.2 12/09/2022    MG 2.1  12/08/2022    ALBUMIN 3.6 12/09/2022    ALBUMIN 3.2 (L) 12/08/2022    PROT 8.1 12/09/2022    PROT 7.0 12/08/2022    ALKPHOS 73 12/09/2022    ALKPHOS 67 12/08/2022    ALT 11 12/09/2022    ALT 7 (L) 12/08/2022    AST 15 12/09/2022    AST 15 12/08/2022    BILITOT 1.0 12/09/2022    BILITOT 0.9 12/08/2022        CARDIAC PROFILE - LAST 2  Lab Results   Component Value Date     (H) 12/08/2022    BNP 1,108 (H) 05/24/2020     (H) 05/24/2020     (H) 05/24/2020    TROPONINI 0.060 (HH) 05/25/2020    TROPONINI 0.061 (HH) 05/25/2020    TROPONINIHS 21.0 (H) 12/08/2022    TROPONINIHS 23.6 (H) 12/08/2022        COAGULATION - LAST 2  Lab Results   Component Value Date    LABPT 15.6 (H) 12/08/2022    LABPT 13.7 01/27/2020    INR 1.3 12/08/2022    INR 1.1 01/27/2020    APTT 41.7 (H) 12/08/2022    APTT 36.0 (H) 01/27/2020       ENDOCRINE & PSA - LAST 2  Lab Results   Component Value Date    TSH 2.670 12/08/2022    TSH 11.339 (H) 10/20/2022    PROCAL 0.12 05/24/2020        ECHOCARDIOGRAM RESULTS  Results for orders placed during the hospital encounter of 12/08/22    Echo    Interpretation Summary  · The left ventricle is mildly enlarged with mild concentric hypertrophy and severely decreased systolic function.  · The estimated ejection fraction is 20%.  · Grade I left ventricular diastolic dysfunction.  · There is severe left ventricular global hypokinesis.  · Normal right ventricular size with normal right ventricular systolic function.  · Mild tricuspid regurgitation.  · Moderate-to-severe aortic regurgitation.  · Mild mitral regurgitation.  · Normal central venous pressure (3 mmHg).  · The estimated PA systolic pressure is 14 mmHg.  · There is no pulmonary hypertension.      CURRENT/PREVIOUS VISIT EKG  Results for orders placed or performed during the hospital encounter of 12/08/22   EKG 12-lead    Collection Time: 12/08/22  2:27 PM    Narrative    Test Reason : R06.02,    Vent. Rate : 081 BPM     Atrial Rate :  081 BPM     P-R Int : 238 ms          QRS Dur : 104 ms      QT Int : 418 ms       P-R-T Axes : -21 -01 205 degrees     QTc Int : 485 ms    Atrial-paced rhythm with prolonged AV conduction  T wave abnormality, consider lateral ischemia  Prolonged QT  Abnormal ECG  When compared with ECG of 23-NOV-2021 09:52,  T wave inversion no longer evident in Inferior leads  Confirmed by Nathaniel West MD ([id number]) on 12/10/2022 2:40:47 PM    Referred By: RAMESH   SELF           Confirmed By:Nathaniel West MD     No valid procedures specified.   No results found for this or any previous visit.    No valid procedures specified.    PHYSICAL EXAM  CONSTITUTIONAL: Well built, well nourished in no apparent distress  NECK: no carotid bruit, no JVD  LUNGS: CTA  CHEST WALL: no tenderness ICD in the left chest wall  HEART: regular rate and rhythm, S1, S2 normal, no murmur, click, rub or gallop   ABDOMEN: soft, non-tender; bowel sounds normal; no masses,  no organomegaly  EXTREMITIES: Extremities normal, no edema, no calf tenderness noted  NEURO: AAO X 3    I HAVE REVIEWED :    The vital signs, nurses notes, and all the pertinent radiology and labs.  I have also reviewed the pacemaker interrogation report of March 21, 2023      Current Outpatient Medications   Medication Instructions    acetaminophen (TYLENOL) 500 mg, Oral, Every 6 hours PRN    amiodarone (PACERONE) 200 mg, Oral, Daily    apixaban (ELIQUIS) 2.5 mg, Oral, 2 times daily    atorvastatin (LIPITOR) 40 mg, Oral, Daily    clopidogreL (PLAVIX) 75 mg, Oral, Daily    finasteride (PROSCAR) 5 mg, Oral, Daily    furosemide (LASIX) 20 mg, Oral, Daily    leflunomide (ARAVA) 20 mg, Oral, Daily    levothyroxine (SYNTHROID) 112 mcg, Oral, Before breakfast, Take on empty stomach an hour before another other medications.    loratadine (CLARITIN) 10 mg, Oral, Daily    losartan (COZAAR) 25 mg, Oral, Daily    methotrexate 2.5 mg, Oral, Every 7 days    metoprolol succinate (TOPROL-XL) 25 mg,  Oral, Daily    pantoprazole (PROTONIX) 40 mg, Oral, Daily    potassium chloride (KLOR-CON) 20 mEq Pack 20 mEq, Oral, Once    prednisoLONE acetate (PRED FORTE) 1 % DrpS 1 drop, 4 times daily PRN    spironolactone (ALDACTONE) 25 MG tablet TAKE 1 TABLET EVERY DAY          Assessment & Plan     Cardiomyopathy  He is fairly well compensated at this time.  I have encouraged him to continue on Cozaar 25 mg a day, Aldactone 25 mg once daily and metoprolol succinate 25 mg once daily.  He is on Lasix 20 mg a day along with potassium supplements 20 mEq daily maintain the same regimen monitor labs.    Dyslipidemia  Recheck labs and risk factor assessment.  Maintain low-fat low-cholesterol diet    His last lipid levels are follows   Latest Reference Range & Units Most Recent   Cholesterol 120 - 199 mg/dL 180  10/20/22 10:40   HDL 40 - 75 mg/dL 44  10/20/22 10:40   HDL/Cholesterol Ratio 20.0 - 50.0 % 24.4  10/20/22 10:40   LDL Cholesterol External 63.0 - 159.0 mg/dL 125.2  10/20/22 10:40   Non-HDL Cholesterol mg/dL 136  10/20/22 10:40   Total Cholesterol/HDL Ratio 2.0 - 5.0  4.1  10/20/22 10:40   Triglycerides 30 - 150 mg/dL 54  10/20/22 10:40     He is already taking at[facility]atin [facility]  Encouraged him to continue same maintain on low-fat low-cholesterol diet    Thyroid disease  Continue on levothyroxine 112 mcg daily.    ICD (implantable cardioverter-defibrillator), dual, in situ  ICD interrogation noted no shock therapies delivered and maintaining stable rhythm.  Details as in history    Paroxysmal atrial fibrillation  Patient has been maintaining regular rhythm.  Continue Eliquis 2.5 mg b.i.d. as well as Plavix combination of 75 mg daily.  He is on amiodarone 200 mg a day this could be reduced to half a tablet once a day to minimize side effect profile.      At this point he may be taking 80 mg tablet of atorvastatin.  I have encouraged him to take it at night is [facility] the goal is to get his LDL cholesterol below 70 as well.    Follow up in about 6 months (around 9/28/2023).

## 2023-03-28 NOTE — ASSESSMENT & PLAN NOTE
He is fairly well compensated at this time.  I have encouraged him to continue on Cozaar 25 mg a day, Aldactone 25 mg once daily and metoprolol succinate 25 mg once daily.  He is on Lasix 20 mg a day along with potassium supplements 20 mEq daily maintain the same regimen monitor labs.

## 2023-03-28 NOTE — ASSESSMENT & PLAN NOTE
Patient has been maintaining regular rhythm.  Continue Eliquis 2.5 mg b.i.d. as well as Plavix combination of 75 mg daily.  He is on amiodarone 200 mg a day this could be reduced to half a tablet once a day to minimize side effect profile.

## 2023-03-28 NOTE — ASSESSMENT & PLAN NOTE
Recheck labs and risk factor assessment.  Maintain low-fat low-cholesterol diet    His last lipid levels are follows   Latest Reference Range & Units Most Recent   Cholesterol 120 - 199 mg/dL 180  10/20/22 10:40   HDL 40 - 75 mg/dL 44  10/20/22 10:40   HDL/Cholesterol Ratio 20.0 - 50.0 % 24.4  10/20/22 10:40   LDL Cholesterol External 63.0 - 159.0 mg/dL 125.2  10/20/22 10:40   Non-HDL Cholesterol mg/dL 136  10/20/22 10:40   Total Cholesterol/HDL Ratio 2.0 - 5.0  4.1  10/20/22 10:40   Triglycerides 30 - 150 mg/dL 54  10/20/22 10:40     He is already taking atorvastatin 40 mg daily [facility].  Encouraged him to continue same maintain on low-fat low-cholesterol diet

## 2023-08-28 DIAGNOSIS — Z95.810 ICD (IMPLANTABLE CARDIOVERTER-DEFIBRILLATOR), DUAL, IN SITU: Primary | ICD-10-CM

## 2023-09-13 ENCOUNTER — TELEPHONE (OUTPATIENT)
Dept: CARDIOLOGY | Facility: CLINIC | Age: 73
End: 2023-09-13
Payer: MEDICARE

## 2023-09-13 NOTE — TELEPHONE ENCOUNTER
----- Message from Raymon Hassan sent at 9/13/2023  3:59 PM CDT -----  Regarding: appointment  Contact: patient  Type:  Sooner Apoointment Request    Caller is requesting a sooner appointment.  Caller declined first available appointment listed below.  Caller will not accept being placed on the waitlist and is requesting a message be sent to doctor.  Name of Caller:patient  When is the first available appointment?unable to schedule in [place] location  Symptoms:need sooner appointment/ problem concern  Would the patient rather a call back or a response via [facility]? Please call to schedule  Best Call Back Number:[phone]  Additional Information:

## 2024-03-14 DIAGNOSIS — Z95.810 ICD (IMPLANTABLE CARDIOVERTER-DEFIBRILLATOR), DUAL, IN SITU: Primary | ICD-10-CM

## 2024-05-07 ENCOUNTER — HOSPITAL ENCOUNTER (INPATIENT)
Facility: HOSPITAL | Age: 74
LOS: 1 days | Discharge: HOME | DRG: 291 | End: 2024-05-09
Attending: EMERGENCY MEDICINE | Admitting: INTERNAL MEDICINE
Payer: MEDICARE

## 2024-05-07 DIAGNOSIS — I50.9 CHF (CONGESTIVE HEART FAILURE): ICD-10-CM

## 2024-05-07 DIAGNOSIS — I50.9 ACUTE ON CHRONIC CONGESTIVE HEART FAILURE, UNSPECIFIED HEART FAILURE TYPE: Primary | ICD-10-CM

## 2024-05-07 DIAGNOSIS — R06.02 SOB (SHORTNESS OF BREATH): ICD-10-CM

## 2024-05-07 DIAGNOSIS — R07.9 CHEST PAIN: ICD-10-CM

## 2024-05-07 PROBLEM — D64.9 ANEMIA: Status: ACTIVE | Noted: 2020-05-25

## 2024-05-07 LAB
ALBUMIN SERPL BCP-MCNC: 3.6 G/DL (ref 3.5–5.2)
ALP SERPL-CCNC: 62 U/L (ref 55–135)
ALT SERPL W/O P-5'-P-CCNC: 10 U/L (ref 10–44)
ANION GAP SERPL CALC-SCNC: 5 MMOL/L (ref 8–16)
AST SERPL-CCNC: 18 U/L (ref 10–40)
BASOPHILS # BLD AUTO: 0.04 K/UL (ref 0–0.2)
BASOPHILS NFR BLD: 1.3 % (ref 0–1.9)
BILIRUB SERPL-MCNC: 0.4 MG/DL (ref 0.1–1)
BNP SERPL-MCNC: 1139 PG/ML (ref 0–99)
BUN SERPL-MCNC: 15 MG/DL (ref 8–23)
CALCIUM SERPL-MCNC: 9 MG/DL (ref 8.7–10.5)
CHLORIDE SERPL-SCNC: 104 MMOL/L (ref 95–110)
CO2 SERPL-SCNC: 30 MMOL/L (ref 23–29)
CREAT SERPL-MCNC: 1 MG/DL (ref 0.5–1.4)
DIFFERENTIAL METHOD BLD: ABNORMAL
EOSINOPHIL # BLD AUTO: 0.1 K/UL (ref 0–0.5)
EOSINOPHIL NFR BLD: 4.2 % (ref 0–8)
ERYTHROCYTE [DISTWIDTH] IN BLOOD BY AUTOMATED COUNT: 14.6 % (ref 11.5–14.5)
EST. GFR  (NO RACE VARIABLE): >60 ML/MIN/1.73 M^2
GLUCOSE SERPL-MCNC: 102 MG/DL (ref 70–110)
HCT VFR BLD AUTO: 39.7 % (ref 40–54)
HGB BLD-MCNC: 11.9 G/DL (ref 14–18)
IMM GRANULOCYTES # BLD AUTO: 0 K/UL (ref 0–0.04)
IMM GRANULOCYTES NFR BLD AUTO: 0 % (ref 0–0.5)
INFLUENZA A, MOLECULAR: NEGATIVE
INFLUENZA B, MOLECULAR: NEGATIVE
LYMPHOCYTES # BLD AUTO: 0.6 K/UL (ref 1–4.8)
LYMPHOCYTES NFR BLD: 19 % (ref 18–48)
MAGNESIUM SERPL-MCNC: 2 MG/DL (ref 1.6–2.6)
MCH RBC QN AUTO: 24.1 PG (ref 27–31)
MCHC RBC AUTO-ENTMCNC: 30 G/DL (ref 32–36)
MCV RBC AUTO: 80 FL (ref 82–98)
MONOCYTES # BLD AUTO: 0.4 K/UL (ref 0.3–1)
MONOCYTES NFR BLD: 12.4 % (ref 4–15)
NEUTROPHILS # BLD AUTO: 1.9 K/UL (ref 1.8–7.7)
NEUTROPHILS NFR BLD: 63.1 % (ref 38–73)
NRBC BLD-RTO: 0 /100 WBC
PLATELET # BLD AUTO: 240 K/UL (ref 150–450)
PMV BLD AUTO: 11.4 FL (ref 9.2–12.9)
POTASSIUM SERPL-SCNC: 4.4 MMOL/L (ref 3.5–5.1)
PROT SERPL-MCNC: 7.2 G/DL (ref 6–8.4)
RBC # BLD AUTO: 4.94 M/UL (ref 4.6–6.2)
SARS-COV-2 RDRP RESP QL NAA+PROBE: NEGATIVE
SODIUM SERPL-SCNC: 139 MMOL/L (ref 136–145)
SPECIMEN SOURCE: NORMAL
T4 FREE SERPL-MCNC: 2.76 NG/DL (ref 0.71–1.51)
TROPONIN I SERPL HS-MCNC: 16.9 PG/ML (ref 0–14.9)
TROPONIN I SERPL HS-MCNC: 22.7 PG/ML (ref 0–14.9)
TSH SERPL DL<=0.005 MIU/L-ACNC: 5.75 UIU/ML (ref 0.34–5.6)
WBC # BLD AUTO: 3.06 K/UL (ref 3.9–12.7)

## 2024-05-07 PROCEDURE — 63600175 PHARM REV CODE 636 W HCPCS: Performed by: INTERNAL MEDICINE

## 2024-05-07 PROCEDURE — 85025 COMPLETE CBC W/AUTO DIFF WBC: CPT | Performed by: NURSE PRACTITIONER

## 2024-05-07 PROCEDURE — U0002 COVID-19 LAB TEST NON-CDC: HCPCS | Performed by: NURSE PRACTITIONER

## 2024-05-07 PROCEDURE — 80053 COMPREHEN METABOLIC PANEL: CPT | Performed by: NURSE PRACTITIONER

## 2024-05-07 PROCEDURE — G0378 HOSPITAL OBSERVATION PER HR: HCPCS

## 2024-05-07 PROCEDURE — 93010 ELECTROCARDIOGRAM REPORT: CPT | Mod: ,,, | Performed by: INTERNAL MEDICINE

## 2024-05-07 PROCEDURE — 94799 UNLISTED PULMONARY SVC/PX: CPT

## 2024-05-07 PROCEDURE — 96374 THER/PROPH/DIAG INJ IV PUSH: CPT

## 2024-05-07 PROCEDURE — 84439 ASSAY OF FREE THYROXINE: CPT | Performed by: NURSE PRACTITIONER

## 2024-05-07 PROCEDURE — 83880 ASSAY OF NATRIURETIC PEPTIDE: CPT | Performed by: NURSE PRACTITIONER

## 2024-05-07 PROCEDURE — 96376 TX/PRO/DX INJ SAME DRUG ADON: CPT

## 2024-05-07 PROCEDURE — 84443 ASSAY THYROID STIM HORMONE: CPT | Performed by: NURSE PRACTITIONER

## 2024-05-07 PROCEDURE — 99900035 HC TECH TIME PER 15 MIN (STAT)

## 2024-05-07 PROCEDURE — 84484 ASSAY OF TROPONIN QUANT: CPT | Performed by: NURSE PRACTITIONER

## 2024-05-07 PROCEDURE — 83735 ASSAY OF MAGNESIUM: CPT | Performed by: NURSE PRACTITIONER

## 2024-05-07 PROCEDURE — 94761 N-INVAS EAR/PLS OXIMETRY MLT: CPT

## 2024-05-07 PROCEDURE — 27000221 HC OXYGEN, UP TO 24 HOURS

## 2024-05-07 PROCEDURE — 25000003 PHARM REV CODE 250: Performed by: PHYSICAL THERAPY ASSISTANT

## 2024-05-07 PROCEDURE — 99291 CRITICAL CARE FIRST HOUR: CPT

## 2024-05-07 PROCEDURE — 93005 ELECTROCARDIOGRAM TRACING: CPT | Performed by: INTERNAL MEDICINE

## 2024-05-07 PROCEDURE — 87502 INFLUENZA DNA AMP PROBE: CPT | Performed by: NURSE PRACTITIONER

## 2024-05-07 PROCEDURE — 36415 COLL VENOUS BLD VENIPUNCTURE: CPT | Performed by: NURSE PRACTITIONER

## 2024-05-07 RX ORDER — FUROSEMIDE 10 MG/ML
20 INJECTION INTRAMUSCULAR; INTRAVENOUS
Status: DISCONTINUED | OUTPATIENT
Start: 2024-05-07 | End: 2024-05-07

## 2024-05-07 RX ORDER — ATORVASTATIN CALCIUM 80 MG/1
80 TABLET, FILM COATED ORAL NIGHTLY
Status: ON HOLD | COMMUNITY
Start: 2023-11-06 | End: 2024-05-09

## 2024-05-07 RX ORDER — AMOXICILLIN 250 MG
1 CAPSULE ORAL 2 TIMES DAILY PRN
Status: DISCONTINUED | OUTPATIENT
Start: 2024-05-07 | End: 2024-05-09 | Stop reason: HOSPADM

## 2024-05-07 RX ORDER — SODIUM,POTASSIUM PHOSPHATES 280-250MG
2 POWDER IN PACKET (EA) ORAL
Status: DISCONTINUED | OUTPATIENT
Start: 2024-05-07 | End: 2024-05-09 | Stop reason: HOSPADM

## 2024-05-07 RX ORDER — METOPROLOL SUCCINATE 25 MG/1
25 TABLET, EXTENDED RELEASE ORAL DAILY
Status: DISCONTINUED | OUTPATIENT
Start: 2024-05-08 | End: 2024-05-09 | Stop reason: HOSPADM

## 2024-05-07 RX ORDER — AMIODARONE HYDROCHLORIDE 200 MG/1
200 TABLET ORAL DAILY
Status: DISCONTINUED | OUTPATIENT
Start: 2024-05-08 | End: 2024-05-09 | Stop reason: HOSPADM

## 2024-05-07 RX ORDER — GABAPENTIN 600 MG/1
600 TABLET ORAL 3 TIMES DAILY
Status: ON HOLD | COMMUNITY
Start: 2023-12-13 | End: 2024-05-09

## 2024-05-07 RX ORDER — IPRATROPIUM BROMIDE AND ALBUTEROL SULFATE 2.5; .5 MG/3ML; MG/3ML
3 SOLUTION RESPIRATORY (INHALATION) EVERY 6 HOURS PRN
Status: DISCONTINUED | OUTPATIENT
Start: 2024-05-07 | End: 2024-05-09 | Stop reason: HOSPADM

## 2024-05-07 RX ORDER — GLUCAGON 1 MG
1 KIT INJECTION
Status: DISCONTINUED | OUTPATIENT
Start: 2024-05-07 | End: 2024-05-07

## 2024-05-07 RX ORDER — LEVOTHYROXINE SODIUM 112 UG/1
112 TABLET ORAL
Status: DISCONTINUED | OUTPATIENT
Start: 2024-05-08 | End: 2024-05-09 | Stop reason: HOSPADM

## 2024-05-07 RX ORDER — FUROSEMIDE 10 MG/ML
40 INJECTION INTRAMUSCULAR; INTRAVENOUS ONCE
Status: COMPLETED | OUTPATIENT
Start: 2024-05-07 | End: 2024-05-07

## 2024-05-07 RX ORDER — DOCUSATE SODIUM 100 MG/1
100 CAPSULE, LIQUID FILLED ORAL DAILY PRN
Status: ON HOLD | COMMUNITY
Start: 2023-10-12 | End: 2024-05-09

## 2024-05-07 RX ORDER — FUROSEMIDE 10 MG/ML
40 INJECTION INTRAMUSCULAR; INTRAVENOUS DAILY
Status: DISCONTINUED | OUTPATIENT
Start: 2024-05-08 | End: 2024-05-07

## 2024-05-07 RX ORDER — FERROUS SULFATE 325(65) MG
325 TABLET ORAL DAILY
Status: ON HOLD | COMMUNITY
Start: 2024-03-14 | End: 2024-05-09

## 2024-05-07 RX ORDER — TALC
6 POWDER (GRAM) TOPICAL NIGHTLY PRN
Status: DISCONTINUED | OUTPATIENT
Start: 2024-05-07 | End: 2024-05-09 | Stop reason: HOSPADM

## 2024-05-07 RX ORDER — ASCORBIC ACID 500 MG
500 TABLET ORAL DAILY
COMMUNITY

## 2024-05-07 RX ORDER — NALOXONE HCL 0.4 MG/ML
0.02 VIAL (ML) INJECTION
Status: DISCONTINUED | OUTPATIENT
Start: 2024-05-07 | End: 2024-05-09 | Stop reason: HOSPADM

## 2024-05-07 RX ORDER — ONDANSETRON 4 MG/1
4 TABLET, ORALLY DISINTEGRATING ORAL EVERY 8 HOURS PRN
Status: DISCONTINUED | OUTPATIENT
Start: 2024-05-07 | End: 2024-05-09 | Stop reason: HOSPADM

## 2024-05-07 RX ORDER — FOLIC ACID 1 MG/1
1 TABLET ORAL DAILY
Status: ON HOLD | COMMUNITY
End: 2024-05-09

## 2024-05-07 RX ORDER — IBUPROFEN 200 MG
24 TABLET ORAL
Status: DISCONTINUED | OUTPATIENT
Start: 2024-05-07 | End: 2024-05-07

## 2024-05-07 RX ORDER — LOSARTAN POTASSIUM 25 MG/1
25 TABLET ORAL DAILY
Status: DISCONTINUED | OUTPATIENT
Start: 2024-05-08 | End: 2024-05-09 | Stop reason: HOSPADM

## 2024-05-07 RX ORDER — NITROGLYCERIN 0.4 MG/1
0.4 TABLET SUBLINGUAL EVERY 5 MIN PRN
COMMUNITY

## 2024-05-07 RX ORDER — CETIRIZINE HYDROCHLORIDE 5 MG/1
10 TABLET ORAL DAILY
Status: DISCONTINUED | OUTPATIENT
Start: 2024-05-08 | End: 2024-05-09 | Stop reason: HOSPADM

## 2024-05-07 RX ORDER — CLOPIDOGREL BISULFATE 75 MG/1
75 TABLET ORAL DAILY
Status: DISCONTINUED | OUTPATIENT
Start: 2024-05-08 | End: 2024-05-09 | Stop reason: HOSPADM

## 2024-05-07 RX ORDER — LANOLIN ALCOHOL/MO/W.PET/CERES
800 CREAM (GRAM) TOPICAL
Status: DISCONTINUED | OUTPATIENT
Start: 2024-05-07 | End: 2024-05-09 | Stop reason: HOSPADM

## 2024-05-07 RX ORDER — SPIRONOLACTONE 25 MG/1
25 TABLET ORAL DAILY
Status: DISCONTINUED | OUTPATIENT
Start: 2024-05-07 | End: 2024-05-09 | Stop reason: HOSPADM

## 2024-05-07 RX ORDER — NITROGLYCERIN 0.4 MG/1
0.4 TABLET SUBLINGUAL EVERY 5 MIN PRN
Status: DISCONTINUED | OUTPATIENT
Start: 2024-05-07 | End: 2024-05-09 | Stop reason: HOSPADM

## 2024-05-07 RX ORDER — FUROSEMIDE 10 MG/ML
40 INJECTION INTRAMUSCULAR; INTRAVENOUS EVERY 12 HOURS
Status: DISCONTINUED | OUTPATIENT
Start: 2024-05-07 | End: 2024-05-09 | Stop reason: HOSPADM

## 2024-05-07 RX ORDER — ALBUTEROL SULFATE 90 UG/1
2 AEROSOL, METERED RESPIRATORY (INHALATION) DAILY
COMMUNITY
Start: 2023-11-27

## 2024-05-07 RX ORDER — ATORVASTATIN CALCIUM 40 MG/1
80 TABLET, FILM COATED ORAL NIGHTLY
Status: DISCONTINUED | OUTPATIENT
Start: 2024-05-07 | End: 2024-05-09 | Stop reason: HOSPADM

## 2024-05-07 RX ORDER — ACETAMINOPHEN 500 MG
500 TABLET ORAL EVERY 6 HOURS PRN
Status: DISCONTINUED | OUTPATIENT
Start: 2024-05-07 | End: 2024-05-09 | Stop reason: HOSPADM

## 2024-05-07 RX ORDER — IBUPROFEN 200 MG
16 TABLET ORAL
Status: DISCONTINUED | OUTPATIENT
Start: 2024-05-07 | End: 2024-05-07

## 2024-05-07 RX ORDER — LANOLIN ALCOHOL/MO/W.PET/CERES
1 CREAM (GRAM) TOPICAL DAILY
Status: DISCONTINUED | OUTPATIENT
Start: 2024-05-08 | End: 2024-05-07

## 2024-05-07 RX ORDER — FINASTERIDE 5 MG/1
5 TABLET, FILM COATED ORAL DAILY
Status: DISCONTINUED | OUTPATIENT
Start: 2024-05-08 | End: 2024-05-09 | Stop reason: HOSPADM

## 2024-05-07 RX ORDER — SODIUM CHLORIDE 0.9 % (FLUSH) 0.9 %
10 SYRINGE (ML) INJECTION
Status: DISCONTINUED | OUTPATIENT
Start: 2024-05-07 | End: 2024-05-09 | Stop reason: HOSPADM

## 2024-05-07 RX ORDER — FUROSEMIDE 10 MG/ML
40 INJECTION INTRAMUSCULAR; INTRAVENOUS ONCE
Status: DISCONTINUED | OUTPATIENT
Start: 2024-05-07 | End: 2024-05-07

## 2024-05-07 RX ORDER — PANTOPRAZOLE SODIUM 40 MG/1
40 TABLET, DELAYED RELEASE ORAL DAILY
Status: DISCONTINUED | OUTPATIENT
Start: 2024-05-08 | End: 2024-05-09 | Stop reason: HOSPADM

## 2024-05-07 RX ORDER — HYDRALAZINE HYDROCHLORIDE 25 MG/1
25 TABLET, FILM COATED ORAL EVERY 8 HOURS PRN
Status: DISCONTINUED | OUTPATIENT
Start: 2024-05-07 | End: 2024-05-09 | Stop reason: HOSPADM

## 2024-05-07 RX ORDER — SILDENAFIL 50 MG/1
25 TABLET, FILM COATED ORAL DAILY PRN
COMMUNITY

## 2024-05-07 RX ADMIN — APIXABAN 2.5 MG: 2.5 TABLET, FILM COATED ORAL at 08:05

## 2024-05-07 RX ADMIN — HYDRALAZINE HYDROCHLORIDE 25 MG: 25 TABLET ORAL at 09:05

## 2024-05-07 RX ADMIN — ATORVASTATIN CALCIUM 80 MG: 40 TABLET, FILM COATED ORAL at 08:05

## 2024-05-07 RX ADMIN — FUROSEMIDE 40 MG: 10 INJECTION, SOLUTION INTRAMUSCULAR; INTRAVENOUS at 03:05

## 2024-05-07 RX ADMIN — SPIRONOLACTONE 25 MG: 25 TABLET ORAL at 04:05

## 2024-05-07 RX ADMIN — FUROSEMIDE 40 MG: 10 INJECTION, SOLUTION INTRAMUSCULAR; INTRAVENOUS at 10:05

## 2024-05-07 NOTE — ED PROVIDER NOTES
Encounter Date: 2024       History     Chief Complaint   Patient presents with    Shortness of Breath     Pt says he feels SOB and that last time he felt like this, they had to take fluid off     74-year-old male with a past medical history of atrial fibrillation, cardiomyopathy, heart failure, sarcoidosis, and hypertension presents for shortness of breath.  He reports that it started yesterday and is getting progressively worse.  He denies any associated chest pain, fever, abdominal pain, nausea/vomiting, lower extremity edema, or diarrhea.  He does report a nonproductive cough as well.  He reports that he feels like he needs to have fluid taken off of him.  He reports compliance with his home diuretic medication.  There are no alleviating or aggravating factors.      Review of patient's allergies indicates:  No Known Allergies  Past Medical History:   Diagnosis Date    A-fib     AICD (automatic cardioverter/defibrillator) present     Arthritis     Atrial fibrillation     Cancer     Cardiomyopathy     COPD (chronic obstructive pulmonary disease)     Heart failure     Histoplasmosis     Hypertension     Sarcoidosis     Sleep apnea     Thyroid disease      Past Surgical History:   Procedure Laterality Date    CARDIAC CATHETERIZATION      CARDIAC DEFIBRILLATOR PLACEMENT Left 2020    CORONARY ANGIOGRAPHY Left 2019    Procedure: CATHETERIZATION, HEART, LEFT (RIGHT RADIAL);  Surgeon: Gerald Romero MD;  Location: [facility] CATH/EP LAB;  Service: Cardiology;  Laterality: Left;    CORONARY STENT PLACEMENT      INSERTION OF BIVENTRICULAR IMPLANTABLE CARDIOVERTER-DEFIBRILLATOR (ICD)      TOTAL THYROIDECTOMY       Family History   Problem Relation Name Age of Onset    Hypertension Father       Social History     Tobacco Use    Smoking status: Former     Current packs/day: 0.00     Types: Cigarettes     Quit date: 2014     Years since quittin.6    Smokeless tobacco: Former   Substance Use Topics     Alcohol use: Not Currently    Drug use: Never     Review of Systems   Constitutional:  Negative for chills, diaphoresis, fatigue and fever.   HENT:  Negative for congestion and rhinorrhea.    Respiratory:  Positive for cough and shortness of breath.    Cardiovascular:  Negative for chest pain.   Gastrointestinal:  Negative for abdominal pain, diarrhea, nausea and vomiting.   Genitourinary:  Negative for dysuria, frequency and testicular pain.   Musculoskeletal:  Negative for gait problem.   Skin:  Negative for color change.   Neurological:  Negative for dizziness and numbness.   Psychiatric/Behavioral:  Negative for agitation and confusion.        Physical Exam     Initial Vitals [05/07/24 1145]   BP Pulse Resp Temp SpO2   (!) 142/98 69 18 97.8 °F (36.6 °C) (!) 94 %      MAP       --         Physical Exam    Nursing note and vitals reviewed.  Constitutional: He appears well-developed and well-nourished.   HENT:   Head: Normocephalic and atraumatic.   Eyes: EOM are normal. Pupils are equal, round, and reactive to light.   Neck: Neck supple.   Cardiovascular:  Normal rate and regular rhythm.           Pulmonary/Chest: He has rales.   Pacemaker/defibrillator noted in the left upper chest.    Coarse breath sounds noted bilaterally.   Abdominal: Abdomen is soft. Bowel sounds are normal. He exhibits no distension. There is no abdominal tenderness. There is no rebound and no guarding.   Musculoskeletal:         General: Normal range of motion.      Cervical back: Neck supple.     Neurological: He is alert and oriented to person, place, and time.   Skin: Skin is warm and dry.   Psychiatric: He has a normal mood and affect.         ED Course   Critical Care    Date/Time: 5/7/2024 2:33 PM    Performed by: See Brooke MD  Authorized by: See Brooke MD  Direct patient critical care time: 14 minutes  Ordering / reviewing critical care time: 11 minutes  Documentation critical care time: 10 minutes  Total critical  care time (exclusive of procedural time) : 35 minutes  Critical care was time spent personally by me on the following activities: development of treatment plan with patient or surrogate, examination of patient, obtaining history from patient or surrogate, ordering and performing treatments and interventions, ordering and review of laboratory studies and ordering and review of radiographic studies.        Labs Reviewed   CBC W/ AUTO DIFFERENTIAL - Abnormal; Notable for the following components:       Result Value    WBC 3.06 (*)     Hemoglobin 11.9 (*)     Hematocrit 39.7 (*)     MCV 80 (*)     MCH 24.1 (*)     MCHC 30.0 (*)     RDW 14.6 (*)     Lymph # 0.6 (*)     All other components within normal limits   COMPREHENSIVE METABOLIC PANEL - Abnormal; Notable for the following components:    CO2 30 (*)     Anion Gap 5 (*)     All other components within normal limits   TROPONIN I HIGH SENSITIVITY - Abnormal; Notable for the following components:    Troponin I High Sensitivity 22.7 (*)     All other components within normal limits   B-TYPE NATRIURETIC PEPTIDE - Abnormal; Notable for the following components:    BNP 1,139 (*)     All other components within normal limits   MAGNESIUM   SARS-COV-2 RNA AMPLIFICATION, QUAL   INFLUENZA A AND B ANTIGEN    Narrative:     Specimen Source->Nasopharyngeal Swab   TSH   TROPONIN I HIGH SENSITIVITY   TSH     EKG Readings: (Independently Interpreted)   Initial Reading: No STEMI. Rhythm: Paced Rhythm. Heart Rate: 71. Ectopy: No Ectopy. Conduction: Normal. ST Segments: Normal ST Segments. T Waves: Normal. Other Findings: Prolonged QT Interval. Clinical Impression: Paced Rhythm     ECG Results              EKG 12-lead (In process)        Collection Time Result Time QRS Duration OHS QTC Calculation    05/07/24 12:25:13 05/07/24 12:27:28 108 495                     In process by Interface, Lab In [facility] (05/07/24 12:27:32)                   Narrative:    Test Reason : R06.02,    Vent.  Rate : 071 BPM     Atrial Rate : 071 BPM     P-R Int : 202 ms          QRS Dur : 108 ms      QT Int : 456 ms       P-R-T Axes : 033 037 220 degrees     QTc Int : 495 ms    Atrial-paced rhythm  ST and T wave abnormality, consider inferolateral ischemia  Prolonged QT  Abnormal ECG  When compared with ECG of 08-DEC-2022 14:27,  Inverted T waves have replaced nonspecific T wave abnormality in Inferior  leads    Referred By: [email or web address]   SELF           Confirmed By:                                   Imaging Results              US Lower Extremity Veins Bilateral (In process)                      X-Ray Chest PA And Lateral (Final result)  Result time 05/07/24 13:22:24      Final result by Abilio Rosenberg DO (05/07/24 13:22:24)                   Impression:      Chronic bilateral mixed interstitial and airspace lung opacities demonstrated are unchanged from previous exam.      Electronically signed by: Abilio Rosenberg  Date:    05/07/2024  Time:    13:22               Narrative:    EXAMINATION:  XR CHEST PA AND LATERAL    CLINICAL HISTORY:  SOB;    FINDINGS:  PA and lateral chest with comparison chest x-ray 12/08/2022.  There is stable mild enlargement of the cardiomediastinal silhouette..  There are bilateral mixed interstitial and airspace lung opacities, unchanged from previous exam.  Left 2 lead cardiac pacemaker observed.  Pulmonary vasculature is normal. No acute osseous abnormality.                                       Medications   sodium chloride 0.9% flush 10 mL (has no administration in time range)   furosemide injection 40 mg (has no administration in time range)   ondansetron disintegrating tablet 4 mg (has no administration in time range)   senna-docusate 8.6-50 mg per tablet 1 tablet (has no administration in time range)   potassium bicarbonate disintegrating tablet 50 mEq (has no administration in time range)   potassium bicarbonate disintegrating tablet 35 mEq (has no administration in time range)    potassium bicarbonate disintegrating tablet 60 mEq (has no administration in time range)   magnesium oxide tablet 800 mg (has no administration in time range)   magnesium oxide tablet 800 mg (has no administration in time range)   potassium, sodium phosphates 280-160-250 mg packet 2 packet (has no administration in time range)   potassium, sodium phosphates 280-160-250 mg packet 2 packet (has no administration in time range)   potassium, sodium phosphates 280-160-250 mg packet 2 packet (has no administration in time range)   acetaminophen tablet 500 mg (has no administration in time range)   amiodarone tablet 200 mg (has no administration in time range)   apixaban tablet 2.5 mg (has no administration in time range)   atorvastatin tablet 80 mg (has no administration in time range)   clopidogreL tablet 75 mg (has no administration in time range)   ferrous sulfate tablet 1 each (has no administration in time range)   finasteride tablet 5 mg (has no administration in time range)   levothyroxine tablet 112 mcg (has no administration in time range)   cetirizine tablet 10 mg (has no administration in time range)   losartan tablet 25 mg (has no administration in time range)   metoprolol succinate (TOPROL-XL) 24 hr tablet 25 mg (has no administration in time range)   nitroGLYCERIN SL tablet 0.4 mg (has no administration in time range)   pantoprazole EC tablet 40 mg (has no administration in time range)   spironolactone tablet 25 mg (has no administration in time range)   albuterol-ipratropium 2.5 mg-0.5 mg/3 mL nebulizer solution 3 mL (has no administration in time range)   melatonin tablet 6 mg (has no administration in time range)   naloxone 0.4 mg/mL injection 0.02 mg (has no administration in time range)   glucose chewable tablet 16 g (has no administration in time range)   glucose chewable tablet 24 g (has no administration in time range)   dextrose 50% injection 12.5 g (has no administration in time range)   dextrose  50% injection 25 g (has no administration in time range)   glucagon (human recombinant) injection 1 mg (has no administration in time range)   furosemide injection 40 mg (40 mg Intravenous Given 5/7/24 5804)     Medical Decision Making  74-year-old male presented with shortness of breath.    Initial differential diagnosis included but not limited to heart failure exacerbation, atypical MI, and pneumonia.    Amount and/or Complexity of Data Reviewed  Labs: ordered.  Radiology: ordered.  ECG/medicine tests: ordered.    Risk  Prescription drug management.  Risk Details: The patient was emergently evaluated in the emergency department, his evaluation was significant for an elderly male with abnormal lung sounds.  The patient's chest x-ray does show pulmonary congestion per my independent interpretation.  The patient's labs were significant for an elevated BNP.  The patient's EKG showed no acute abnormalities per my independent interpretation.  The patient is likely having an acute exacerbation of his chronic heart failure.  He was treated here with IV Lasix.  He will be admitted to the hospitalist service for further care.  The case was discussed with the APC on call for the hospitalist service.  She is accepted the patient for admission.               ED Course as of 05/07/24 1533   Tue May 07, 2024   1317 BP(!): 142/98 [EF]   1317 Temp: 97.8 °F (36.6 °C) [EF]   1317 Temp Source: Oral [EF]   1318 Pulse: 69 [EF]   1318 Resp: 18 [EF]   1318 SpO2(!): 94 % [EF]      ED Course User Index  [EF] Rodolfo Espitia MD                           Clinical Impression:  Final diagnoses:  [R06.02] SOB (shortness of breath)  [I50.9] Acute on chronic congestive heart failure, unspecified heart failure type (Primary)          ED Disposition Condition    Observation Stable                See Brooke MD  05/07/24 9023

## 2024-05-07 NOTE — FIRST PROVIDER EVALUATION
Emergency Department TeleTriage Encounter Note      CHIEF COMPLAINT    Chief Complaint   Patient presents with    Shortness of Breath     Pt says he feels SOB and that last time he felt like this, they had to take fluid off       VITAL SIGNS   Initial Vitals [05/07/24 1145]   BP Pulse Resp Temp SpO2   (!) 142/98 69 18 97.8 °F (36.6 °C) (!) 94 %      MAP       --            ALLERGIES    Review of patient's allergies indicates:  No Known Allergies    PROVIDER TRIAGE NOTE  Verbal consent for the teletriage evaluation was given by the patient at the start of the evaluation.  All efforts will be made to maintain patient's privacy during the evaluation.      This is a teletriage evaluation of a 74 y.o. male presenting to the ED with c/o SOB, cough at night, and chest tightness for 2 days. Limited physical exam via telehealth: The patient is awake, alert, answering questions appropriately and is not in respiratory distress.  As the Teletriage provider, I performed an initial assessment and ordered appropriate labs and imaging studies, if any, to facilitate the patient's care once placed in the ED. Once a room is available, care and a full evaluation will be completed by an alternate ED provider.  Any additional orders and the final disposition will be determined by that provider.  All imaging and labs will not be followed-up by the Teletriage Team, including myself.        ORDERS  Labs Reviewed - No data to display    ED Orders (720h ago, onward)      Start Ordered     Status Ordering Provider    05/07/24 1416 05/07/24 1215  Troponin I High Sensitivity #2  Once Timed         Ordered ARLIN ALMONTE    05/07/24 1216 05/07/24 1215  Magnesium  STAT         Ordered ARLIN ALMONTE    05/07/24 1216 05/07/24 1215  Vital signs  Every 15 min         Ordered ARLIN ALMONTE    05/07/24 1216 05/07/24 1215  Cardiac Monitoring - Adult  Continuous        Comments: Notify Physician If:    Ordered ARLIN ALMONTE    05/07/24 1216 05/07/24 1215   Pulse Oximetry Continuous  Continuous         Ordered ARLIN ALMONTE    05/07/24 1216 05/07/24 1215  Diet NPO  Diet effective now         Ordered ARLIN ALMONTE    05/07/24 1216 05/07/24 1215  Saline lock IV  Once         Ordered ARLIN ALMONTE    05/07/24 1216 05/07/24 1215  EKG 12-lead  Once        Comments: Do not perform if previously done during this visit/ triage    Ordered ARLIN ALMONTE    05/07/24 1216 05/07/24 1215  CBC auto differential  STAT         Ordered ARLIN ALMONTE    05/07/24 1216 05/07/24 1215  Comprehensive metabolic panel  STAT         Ordered ARLIN ALMONTE    05/07/24 1216 05/07/24 1215  Troponin I High Sensitivity #1  STAT         Ordered ARLIN ALMONTE    05/07/24 1216 05/07/24 1215  B-Type natriuretic peptide (BNP)  STAT         Ordered ARLIN ALMONTE    05/07/24 1216 05/07/24 1215  X-Ray Chest PA And Lateral  1 time imaging         Ordered ARLIN ALMONTE    05/07/24 1216 05/07/24 1215  COVID-19 Rapid Screening  STAT         Ordered ARLIN ALMONTE    05/07/24 1216 05/07/24 1215  Influenza antigen Nasopharyngeal Swab  Once         Ordered ARLIN ALMONTE              Virtual Visit Note: The provider triage portion of this emergency department evaluation and documentation was performed via [facility], a HIPAA-compliant telemedicine application, in concert with a tele-presenter in the room. A face to face patient evaluation with one of my colleagues will occur once the patient is placed in an emergency department room.      DISCLAIMER: This note was prepared with [employer] voice recognition transcription software. Garbled syntax, mangled pronouns, and other bizarre constructions may be attributed to that software system.

## 2024-05-07 NOTE — ASSESSMENT & PLAN NOTE
Patient with Hypoxic Respiratory failure which is Acute on chronic.  Patient states he is not on home oxygen. Supplemental oxygen was provided and noted-  2L per nasal cannula.    Signs/symptoms of respiratory failure include- tachypnea and increased work of breathing. Contributing diagnoses includes - CHF Labs and images were reviewed. Patient Has not had a recent ABG. Will treat underlying causes and adjust management of respiratory failure as follows- continuous pulse oximetry, O2 per nasal cannula at 2L, treatment for CHF, duonebs PRN

## 2024-05-07 NOTE — SUBJECTIVE & OBJECTIVE
Past Medical History:   Diagnosis Date    A-fib     AICD (automatic cardioverter/defibrillator) present     Arthritis     Atrial fibrillation     Cancer     Cardiomyopathy     COPD (chronic obstructive pulmonary disease)     Heart failure     Histoplasmosis     Hypertension     Sarcoidosis     Sleep apnea     Thyroid disease        Past Surgical History:   Procedure Laterality Date    CARDIAC CATHETERIZATION      CARDIAC DEFIBRILLATOR PLACEMENT Left 01/2020    CORONARY ANGIOGRAPHY Left 9/16/2019    Procedure: CATHETERIZATION, HEART, LEFT (RIGHT RADIAL);  Surgeon: Gerald Romero MD;  Location: [facility] CATH/EP LAB;  Service: Cardiology;  Laterality: Left;    CORONARY STENT PLACEMENT      INSERTION OF BIVENTRICULAR IMPLANTABLE CARDIOVERTER-DEFIBRILLATOR (ICD)      TOTAL THYROIDECTOMY         Review of patient's allergies indicates:  No Known Allergies    No current facility-administered medications on file prior to encounter.     Current Outpatient Medications on File Prior to Encounter   Medication Sig    acetaminophen (TYLENOL) 500 MG tablet Take 500 mg by mouth every 6 (six) hours as needed for Pain.    albuterol (PROVENTIL/VENTOLIN HFA) 90 mcg/actuation inhaler Inhale 2 puffs into the lungs once daily.    amiodarone (PACERONE) 200 MG Tab Take 1 tablet (200 mg total) by mouth once daily.    apixaban (ELIQUIS) 2.5 mg Tab Take 2.5 mg by mouth 2 (two) times daily.    ascorbic acid, vitamin C, (VITAMIN C) 500 MG tablet Take 500 mg by mouth once daily.    atorvastatin (LIPITOR) 80 MG tablet Take 80 mg by mouth every evening.    clopidogreL (PLAVIX) 75 mg tablet Take 1 tablet (75 mg total) by mouth once daily.    docusate sodium (COLACE) 100 MG capsule Take 100 mg by mouth daily as needed.    ferrous sulfate (FEOSOL) 325 mg (65 mg iron) Tab tablet Take 325 mg by mouth once daily.    finasteride (PROSCAR) 5 mg tablet Take 5 mg by mouth once daily.    folic acid (FOLVITE) 1 MG tablet Take 1 tablet by mouth once daily.     furosemide (LASIX) 20 MG tablet Take 1 tablet (20 mg total) by mouth once daily.    gabapentin (NEURONTIN) 600 MG tablet Take 600 mg by mouth 3 (three) times daily.    leflunomide (ARAVA) 20 MG Tab Take 20 mg by mouth once daily.    levothyroxine (SYNTHROID) 112 MCG tablet Take 1 tablet (112 mcg total) by mouth before breakfast. Take on empty stomach an hour before another other medications.    loratadine (CLARITIN) 10 mg tablet Take 10 mg by mouth once daily.    losartan (COZAAR) 25 MG tablet Take 25 mg by mouth once daily.    metoprolol succinate (TOPROL-XL) 25 MG 24 hr tablet Take 25 mg by mouth once daily.     nitroGLYCERIN (NITROSTAT) 0.4 MG SL tablet Place 0.4 mg under the tongue every 5 (five) minutes as needed for Chest pain.    pantoprazole (PROTONIX) 40 MG tablet Take 40 mg by mouth once daily.    potassium chloride (KLOR-CON) 20 mEq Pack Take 20 mEq by mouth once daily.    spironolactone (ALDACTONE) 25 MG tablet TAKE 1 TABLET EVERY DAY (Patient taking differently: Take 25 mg by mouth once daily.)    methotrexate 2.5 MG Tab Take 2.5 mg by mouth every 7 days.    prednisoLONE acetate (PRED FORTE) 1 % DrpS 1 drop 4 (four) times daily as needed.    sildenafiL (VIAGRA) 50 MG tablet Take 25 mg by mouth daily as needed.    [DISCONTINUED] atorvastatin (LIPITOR) 40 MG tablet Take 1 tablet (40 mg total) by mouth once daily.     Family History       Problem Relation (Age of Onset)    Hypertension Father          Tobacco Use    Smoking status: Former     Current packs/day: 0.00     Types: Cigarettes     Quit date: 2014     Years since quittin.6    Smokeless tobacco: Former   Substance and Sexual Activity    Alcohol use: Not Currently    Drug use: Never    Sexual activity: Not on file     Review of Systems   Constitutional:  Positive for fatigue. Negative for appetite change, chills, fever and unexpected weight change.   Respiratory:  Positive for cough and shortness of breath. Negative for wheezing.     Cardiovascular: Negative.  Negative for chest pain, palpitations and leg swelling.   Gastrointestinal: Negative.  Negative for abdominal distention, abdominal pain, constipation, diarrhea, nausea and vomiting.   Genitourinary: Negative.  Negative for decreased urine volume, difficulty urinating, flank pain, hematuria and urgency.   Musculoskeletal: Negative.  Negative for arthralgias, gait problem and myalgias.   Neurological: Negative.  Negative for dizziness, syncope, facial asymmetry, weakness and headaches.     Objective:     Vital Signs (Most Recent):  Temp: 97.8 °F (36.6 °C) (05/07/24 1145)  Pulse: 87 (05/07/24 1538)  Resp: (!) 48 (05/07/24 1538)  BP: (!) 158/101 (05/07/24 1627)  SpO2: (!) 94 % (05/07/24 1538) Vital Signs (24h Range):  Temp:  [97.8 °F (36.6 °C)] 97.8 °F (36.6 °C)  Pulse:  [69-87] 87  Resp:  [18-48] 48  SpO2:  [91 %-94 %] 94 %  BP: (142-167)/() 158/101     Weight: 63.5 kg (140 lb)  Body mass index is 21.29 kg/m².     Physical Exam  Vitals reviewed.   Constitutional:       General: He is not in acute distress.     Appearance: Normal appearance. He is normal weight. He is not toxic-appearing.   Cardiovascular:      Rate and Rhythm: Normal rate and regular rhythm.      Pulses: Normal pulses.      Heart sounds: Normal heart sounds.   Pulmonary:      Effort: Pulmonary effort is normal.      Breath sounds: Rales present.      Comments: Hypoxia lying flat on exam.  Chest:      Chest wall: No tenderness.   Abdominal:      General: Abdomen is flat. Bowel sounds are normal. There is no distension.      Palpations: Abdomen is soft.      Tenderness: There is no abdominal tenderness. There is no guarding.   Musculoskeletal:         General: Normal range of motion.      Cervical back: Normal range of motion and neck supple.      Right lower leg: No edema.      Left lower leg: No edema.   Neurological:      General: No focal deficit present.      Mental Status: He is alert and oriented to person,  place, and time. Mental status is at baseline.      Motor: No weakness.   Psychiatric:         Mood and Affect: Mood normal.              Significant Labs: All pertinent labs within the past 24 hours have been reviewed.  CBC:   Recent Labs   Lab 05/07/24  1249   WBC 3.06*   HGB 11.9*   HCT 39.7*        CMP:   Recent Labs   Lab 05/07/24  1249      K 4.4      CO2 30*      BUN 15   CREATININE 1.0   CALCIUM 9.0   PROT 7.2   ALBUMIN 3.6   BILITOT 0.4   ALKPHOS 62   AST 18   ALT 10   ANIONGAP 5*     Magnesium:   Recent Labs   Lab 05/07/24  1249   MG 2.0     Troponin:   Recent Labs   Lab 05/07/24  1249 05/07/24  1510   TROPONINIHS 22.7* 16.9*     TSH:   Recent Labs   Lab 05/07/24  1510   TSH 5.746*     Significant Imaging: I have reviewed all pertinent imaging results/findings within the past 24 hours.

## 2024-05-07 NOTE — ASSESSMENT & PLAN NOTE
BNP elevated at 1139. CXR showed chronic bilateral mixed interstitial and airspace lung opacities demonstrated are unchanged from previous exam. Patient with cardiac wheezes. No LE edema on exam. Positive orthopnea. IV diuretics.   Cardiac diet with fluid restriction.   Strict I/Os and daily weights.   Updated echo pending.   Monitor electrolytes and replete PRN  Monitor on telemetry due to elevated trop and SOB

## 2024-05-07 NOTE — H&P
[facility] - Emergency Dept  Hospital Medicine  History & Physical    Patient Name: Trevor Madrid  MRN: [id number]  Patient Class: OP- Observation  Admission Date: 5/7/2024  Attending Physician: Demetri Morgan MD   Primary Care Provider: Shelly Primary Doctor         Patient information was obtained from patient and ER records.     Subjective:     Principal Problem:Acute on chronic heart failure    Chief Complaint:   Chief Complaint   Patient presents with    Shortness of Breath     Pt says he feels SOB and that last time he felt like this, they had to take fluid off        HPI: Pt is a 74 year old male with a PMH of COPD, CHF, sarcoidosis, Afib on eliquis, and AICD. Pt present to the ED today with complaints of SOB and cough worsening over the last two days. Pt states he has taken his medications regularly as prescribed but did not take meds today. Pt states he has been out of eliquis for approximately 2 days. BLE ultrasound completed without signs of acute DVT. Pt states he follows with cardiology but missed his last appointment. Pt positive for orthopnea and started on 2L O2 per nasal cannula due to hypoxia in 80s. Pt denies being on regular O2 at home. Pt does report cough with minimal sputum production. Pt negative for covid and flu. Pt is a poor historian. Last echo in 2022 with EF of 20%. Pt denies abdominal pain, nausea, vomiting, fever, chills, chest pain, or LE edema. With presentation and labs, will admit to hospital medicine for acute heart failure exacerbation. Pt is full code.        Past Medical History:   Diagnosis Date    A-fib     AICD (automatic cardioverter/defibrillator) present     Arthritis     Atrial fibrillation     Cancer     Cardiomyopathy     COPD (chronic obstructive pulmonary disease)     Heart failure     Histoplasmosis     Hypertension     Sarcoidosis     Sleep apnea     Thyroid disease        Past Surgical History:   Procedure Laterality Date    CARDIAC CATHETERIZATION       CARDIAC DEFIBRILLATOR PLACEMENT Left 01/2020    CORONARY ANGIOGRAPHY Left 9/16/2019    Procedure: CATHETERIZATION, HEART, LEFT (RIGHT RADIAL);  Surgeon: Gerald Romero MD;  Location: [facility] CATH/EP LAB;  Service: Cardiology;  Laterality: Left;    CORONARY STENT PLACEMENT      INSERTION OF BIVENTRICULAR IMPLANTABLE CARDIOVERTER-DEFIBRILLATOR (ICD)      TOTAL THYROIDECTOMY         Review of patient's allergies indicates:  No Known Allergies    No current facility-administered medications on file prior to encounter.     Current Outpatient Medications on File Prior to Encounter   Medication Sig    acetaminophen (TYLENOL) 500 MG tablet Take 500 mg by mouth every 6 (six) hours as needed for Pain.    albuterol (PROVENTIL/VENTOLIN HFA) 90 mcg/actuation inhaler Inhale 2 puffs into the lungs once daily.    amiodarone (PACERONE) 200 MG Tab Take 1 tablet (200 mg total) by mouth once daily.    apixaban (ELIQUIS) 2.5 mg Tab Take 2.5 mg by mouth 2 (two) times daily.    ascorbic acid, vitamin C, (VITAMIN C) 500 MG tablet Take 500 mg by mouth once daily.    atorvastatin (LIPITOR) 80 MG tablet Take 80 mg by mouth every evening.    clopidogreL (PLAVIX) 75 mg tablet Take 1 tablet (75 mg total) by mouth once daily.    docusate sodium (COLACE) 100 MG capsule Take 100 mg by mouth daily as needed.    ferrous sulfate (FEOSOL) 325 mg (65 mg iron) Tab tablet Take 325 mg by mouth once daily.    finasteride (PROSCAR) 5 mg tablet Take 5 mg by mouth once daily.    folic acid (FOLVITE) 1 MG tablet Take 1 tablet by mouth once daily.    furosemide (LASIX) 20 MG tablet Take 1 tablet (20 mg total) by mouth once daily.    gabapentin (NEURONTIN) 600 MG tablet Take 600 mg by mouth 3 (three) times daily.    leflunomide (ARAVA) 20 MG Tab Take 20 mg by mouth once daily.    levothyroxine (SYNTHROID) 112 MCG tablet Take 1 tablet (112 mcg total) by mouth before breakfast. Take on empty stomach an hour before another other medications.    loratadine  (CLARITIN) 10 mg tablet Take 10 mg by mouth once daily.    losartan (COZAAR) 25 MG tablet Take 25 mg by mouth once daily.    metoprolol succinate (TOPROL-XL) 25 MG 24 hr tablet Take 25 mg by mouth once daily.     nitroGLYCERIN (NITROSTAT) 0.4 MG SL tablet Place 0.4 mg under the tongue every 5 (five) minutes as needed for Chest pain.    pantoprazole (PROTONIX) 40 MG tablet Take 40 mg by mouth once daily.    potassium chloride (KLOR-CON) 20 mEq Pack Take 20 mEq by mouth once daily.    spironolactone (ALDACTONE) 25 MG tablet TAKE 1 TABLET EVERY DAY (Patient taking differently: Take 25 mg by mouth once daily.)    methotrexate 2.5 MG Tab Take 2.5 mg by mouth every 7 days.    prednisoLONE acetate (PRED FORTE) 1 % DrpS 1 drop 4 (four) times daily as needed.    sildenafiL (VIAGRA) 50 MG tablet Take 25 mg by mouth daily as needed.    [DISCONTINUED] atorvastatin (LIPITOR) 40 MG tablet Take 1 tablet (40 mg total) by mouth once daily.     Family History       Problem Relation (Age of Onset)    Hypertension Father          Tobacco Use    Smoking status: Former     Current packs/day: 0.00     Types: Cigarettes     Quit date: 2014     Years since quittin.6    Smokeless tobacco: Former   Substance and Sexual Activity    Alcohol use: Not Currently    Drug use: Never    Sexual activity: Not on file     Review of Systems   Constitutional:  Positive for fatigue. Negative for appetite change, chills, fever and unexpected weight change.   Respiratory:  Positive for cough and shortness of breath. Negative for wheezing.    Cardiovascular: Negative.  Negative for chest pain, palpitations and leg swelling.   Gastrointestinal: Negative.  Negative for abdominal distention, abdominal pain, constipation, diarrhea, nausea and vomiting.   Genitourinary: Negative.  Negative for decreased urine volume, difficulty urinating, flank pain, hematuria and urgency.   Musculoskeletal: Negative.  Negative for arthralgias, gait problem and myalgias.    Neurological: Negative.  Negative for dizziness, syncope, facial asymmetry, weakness and headaches.     Objective:     Vital Signs (Most Recent):  Temp: 97.8 °F (36.6 °C) (05/07/24 1145)  Pulse: 87 (05/07/24 1538)  Resp: (!) 48 (05/07/24 1538)  BP: (!) 158/101 (05/07/24 1627)  SpO2: (!) 94 % (05/07/24 1538) Vital Signs (24h Range):  Temp:  [97.8 °F (36.6 °C)] 97.8 °F (36.6 °C)  Pulse:  [69-87] 87  Resp:  [18-48] 48  SpO2:  [91 %-94 %] 94 %  BP: (142-167)/() 158/101     Weight: 63.5 kg (140 lb)  Body mass index is 21.29 kg/m².     Physical Exam  Vitals reviewed.   Constitutional:       General: He is not in acute distress.     Appearance: Normal appearance. He is normal weight. He is not toxic-appearing.   Cardiovascular:      Rate and Rhythm: Normal rate and regular rhythm.      Pulses: Normal pulses.      Heart sounds: Normal heart sounds.   Pulmonary:      Effort: Pulmonary effort is normal.      Breath sounds: Rales present.      Comments: Hypoxia lying flat on exam.  Chest:      Chest wall: No tenderness.   Abdominal:      General: Abdomen is flat. Bowel sounds are normal. There is no distension.      Palpations: Abdomen is soft.      Tenderness: There is no abdominal tenderness. There is no guarding.   Musculoskeletal:         General: Normal range of motion.      Cervical back: Normal range of motion and neck supple.      Right lower leg: No edema.      Left lower leg: No edema.   Neurological:      General: No focal deficit present.      Mental Status: He is alert and oriented to person, place, and time. Mental status is at baseline.      Motor: No weakness.   Psychiatric:         Mood and Affect: Mood normal.              Significant Labs: All pertinent labs within the past 24 hours have been reviewed.  CBC:   Recent Labs   Lab 05/07/24  1249   WBC 3.06*   HGB 11.9*   HCT 39.7*        CMP:   Recent Labs   Lab 05/07/24  1249      K 4.4      CO2 30*      BUN 15   CREATININE  1.0   CALCIUM 9.0   PROT 7.2   ALBUMIN 3.6   BILITOT 0.4   ALKPHOS 62   AST 18   ALT 10   ANIONGAP 5*     Magnesium:   Recent Labs   Lab 05/07/24  1249   MG 2.0     Troponin:   Recent Labs   Lab 05/07/24  1249 05/07/24  1510   TROPONINIHS 22.7* 16.9*     TSH:   Recent Labs   Lab 05/07/24  1510   TSH 5.746*     Significant Imaging: I have reviewed all pertinent imaging results/findings within the past 24 hours.  Assessment/Plan:     * Acute on chronic heart failure  BNP elevated at 1139. CXR showed chronic bilateral mixed interstitial and airspace lung opacities demonstrated are unchanged from previous exam. Patient with cardiac wheezes. No LE edema on exam. Positive orthopnea. IV diuretics.   Cardiac diet with fluid restriction.   Strict I/Os and daily weights.   Updated echo pending.   Monitor electrolytes and replete PRN  Monitor on telemetry due to elevated trop and SOB    Acute hypoxemic respiratory failure  Patient with Hypoxic Respiratory failure which is Acute on chronic.  Patient states he is not on home oxygen. Supplemental oxygen was provided and noted-  2L per nasal cannula.    Signs/symptoms of respiratory failure include- tachypnea and increased work of breathing. Contributing diagnoses includes - CHF Labs and images were reviewed. Patient Has not had a recent ABG. Will treat underlying causes and adjust management of respiratory failure as follows- continuous pulse oximetry, O2 per nasal cannula at 2L, treatment for CHF, duonebs PRN    ICD (implantable cardioverter-defibrillator), dual, in situ  Pacemaker interrogation, monitor on telemetry       Hypertension  Chronic, controlled. Latest blood pressure and vitals reviewed-     Temp:  [97.8 °F (36.6 °C)]   Pulse:  [69-87]   Resp:  [18-48]   BP: (142-167)/()   SpO2:  [91 %-94 %] .   Home meds for hypertension were reviewed and noted below.   Hypertension Medications               furosemide (LASIX) 20 MG tablet Take 1 tablet (20 mg total) by mouth  once daily.    losartan (COZAAR) 25 MG tablet Take 25 mg by mouth once daily.    metoprolol succinate (TOPROL-XL) 25 MG 24 hr tablet Take 25 mg by mouth once daily.     nitroGLYCERIN (NITROSTAT) 0.4 MG SL tablet Place 0.4 mg under the tongue every 5 (five) minutes as needed for Chest pain.    spironolactone (ALDACTONE) 25 MG tablet TAKE 1 TABLET EVERY DAY            While in the hospital, will manage blood pressure as follows; Continue home antihypertensive regimen    Will utilize p.r.n. blood pressure medication only if patient's blood pressure greater than 180/110 and he develops symptoms such as worsening chest pain or shortness of breath.    Anemia  Patient's anemia is currently controlled. Has not received any PRBCs to date.   Current CBC reviewed-   Lab Results   Component Value Date    HGB 11.9 (L) 05/07/2024    HCT 39.7 (L) 05/07/2024     Monitor serial CBC and transfuse if patient becomes hemodynamically unstable, symptomatic or H/H drops below 7/21.    Leukopenia  Chronic, monitor daily CBC     Sarcoidosis  O2 PRN, continuous pulse oximetry         VTE Risk Mitigation (From admission, onward)           Ordered     apixaban tablet 2.5 mg  2 times daily         05/07/24 1521     IP VTE HIGH RISK PATIENT  Once         05/07/24 1513     Place sequential compression device  Until discontinued         05/07/24 1513                     On 05/07/2024, patient should be placed in hospital observation services under my care in collaboration with Dr Morgan.       AKANKSHA Powell  Department of Hospital Medicine  [facility] - Emergency Dept

## 2024-05-07 NOTE — ED NOTES
"Patient noted to be sitting on side of bed with nasal cannula on the floor. Oxygen sats 88-91% Reinforced need to wear oxygen as ordered, patient agreed and states " I was trying to use the urinal and there are so many wires." Reinforced call bell use if assistance is needed. Sandwhich tray offered. Urinals emptied. Will continue to monitor   "

## 2024-05-07 NOTE — HPI
Pt is a 74 year old male with a PMH of COPD, CHF, sarcoidosis, Afib on eliquis, and AICD. Pt present to the ED today with complaints of SOB and cough worsening over the last two days. Pt states he has taken his medications regularly as prescribed but did not take meds today. Pt states he has been out of eliquis for approximately 2 days. BLE ultrasound completed without signs of acute DVT. Pt states he follows with cardiology but missed his last appointment. Pt positive for orthopnea and started on 2L O2 per nasal cannula due to hypoxia in 80s. Pt denies being on regular O2 at home. Pt does report cough with minimal sputum production. Pt negative for covid and flu. Pt is a poor historian. Last echo in 2022 with EF of 20%. Pt denies abdominal pain, nausea, vomiting, fever, chills, chest pain, or LE edema. With presentation and labs, will admit to hospital medicine for acute heart failure exacerbation. Pt is full code.       There are no Wet Read(s) to document.

## 2024-05-07 NOTE — ASSESSMENT & PLAN NOTE
Patient's anemia is currently controlled. Has not received any PRBCs to date.   Current CBC reviewed-   Lab Results   Component Value Date    HGB 11.9 (L) 05/07/2024    HCT 39.7 (L) 05/07/2024     Monitor serial CBC and transfuse if patient becomes hemodynamically unstable, symptomatic or H/H drops below 7/21.

## 2024-05-07 NOTE — ASSESSMENT & PLAN NOTE
Chronic, controlled. Latest blood pressure and vitals reviewed-     Temp:  [97.8 °F (36.6 °C)]   Pulse:  [69-87]   Resp:  [18-48]   BP: (142-167)/()   SpO2:  [91 %-94 %] .   Home meds for hypertension were reviewed and noted below.   Hypertension Medications               furosemide (LASIX) 20 MG tablet Take 1 tablet (20 mg total) by mouth once daily.    losartan (COZAAR) 25 MG tablet Take 25 mg by mouth once daily.    metoprolol succinate (TOPROL-XL) 25 MG 24 hr tablet Take 25 mg by mouth once daily.     nitroGLYCERIN (NITROSTAT) 0.4 MG SL tablet Place 0.4 mg under the tongue every 5 (five) minutes as needed for Chest pain.    spironolactone (ALDACTONE) 25 MG tablet TAKE 1 TABLET EVERY DAY            While in the hospital, will manage blood pressure as follows; Continue home antihypertensive regimen    Will utilize p.r.n. blood pressure medication only if patient's blood pressure greater than 180/110 and he develops symptoms such as worsening chest pain or shortness of breath.

## 2024-05-08 ENCOUNTER — CLINICAL SUPPORT (OUTPATIENT)
Dept: CARDIOLOGY | Facility: HOSPITAL | Age: 74
DRG: 291 | End: 2024-05-08
Attending: EMERGENCY MEDICINE
Payer: MEDICARE

## 2024-05-08 VITALS — DIASTOLIC BLOOD PRESSURE: 74 MMHG | SYSTOLIC BLOOD PRESSURE: 113 MMHG

## 2024-05-08 LAB
ANION GAP SERPL CALC-SCNC: 11 MMOL/L (ref 8–16)
AORTIC ROOT ANNULUS: 3.9 CM
AORTIC VALVE CUSP SEPERATION: 2.1 CM
AV INDEX (PROSTH): 0.42
AV MEAN GRADIENT: 5 MMHG
AV PEAK GRADIENT: 10 MMHG
AV REGURGITATION PRESSURE HALF TIME: 702 MS
AV VALVE AREA BY VELOCITY RATIO: 1.92 CM²
AV VALVE AREA: 1.9 CM²
AV VELOCITY RATIO: 0.43
BASOPHILS # BLD AUTO: 0.04 K/UL (ref 0–0.2)
BASOPHILS NFR BLD: 1.2 % (ref 0–1.9)
BSA FOR ECHO PROCEDURE: 1.75 M2
BUN SERPL-MCNC: 16 MG/DL (ref 8–23)
CALCIUM SERPL-MCNC: 9.1 MG/DL (ref 8.7–10.5)
CHLORIDE SERPL-SCNC: 100 MMOL/L (ref 95–110)
CO2 SERPL-SCNC: 26 MMOL/L (ref 23–29)
CREAT SERPL-MCNC: 0.9 MG/DL (ref 0.5–1.4)
CV ECHO LV RWT: 0.29 CM
DIFFERENTIAL METHOD BLD: ABNORMAL
DOP CALC AO PEAK VEL: 1.6 M/S
DOP CALC AO VTI: 28.5 CM
DOP CALC LVOT AREA: 4.5 CM2
DOP CALC LVOT DIAMETER: 2.4 CM
DOP CALC LVOT PEAK VEL: 0.68 M/S
DOP CALC LVOT STROKE VOLUME: 54.26 CM3
DOP CALC MV VTI: 17.7 CM
DOP CALCLVOT PEAK VEL VTI: 12 CM
E WAVE DECELERATION TIME: 261 MSEC
E/A RATIO: 0.71
E/E' RATIO: 7.56 M/S
ECHO LV POSTERIOR WALL: 0.9 CM (ref 0.6–1.1)
EOSINOPHIL # BLD AUTO: 0.1 K/UL (ref 0–0.5)
EOSINOPHIL NFR BLD: 3 % (ref 0–8)
ERYTHROCYTE [DISTWIDTH] IN BLOOD BY AUTOMATED COUNT: 14.5 % (ref 11.5–14.5)
EST. GFR  (NO RACE VARIABLE): >60 ML/MIN/1.73 M^2
FRACTIONAL SHORTENING: 5 % (ref 28–44)
GLUCOSE SERPL-MCNC: 93 MG/DL (ref 70–110)
HCT VFR BLD AUTO: 41.2 % (ref 40–54)
HGB BLD-MCNC: 12.7 G/DL (ref 14–18)
IMM GRANULOCYTES # BLD AUTO: 0.01 K/UL (ref 0–0.04)
IMM GRANULOCYTES NFR BLD AUTO: 0.3 % (ref 0–0.5)
INTERVENTRICULAR SEPTUM: 1.1 CM (ref 0.6–1.1)
IVC DIAMETER: 1.3 CM
LEFT ATRIUM SIZE: 2.4 CM
LEFT ATRIUM VOLUME INDEX MOD: 42.5 ML/M2
LEFT ATRIUM VOLUME MOD: 74.8 CM3
LEFT INTERNAL DIMENSION IN SYSTOLE: 5.9 CM (ref 2.1–4)
LEFT VENTRICLE DIASTOLIC VOLUME INDEX: 110.23 ML/M2
LEFT VENTRICLE DIASTOLIC VOLUME: 194 ML
LEFT VENTRICLE MASS INDEX: 148 G/M2
LEFT VENTRICLE SYSTOLIC VOLUME INDEX: 98.3 ML/M2
LEFT VENTRICLE SYSTOLIC VOLUME: 173 ML
LEFT VENTRICULAR INTERNAL DIMENSION IN DIASTOLE: 6.2 CM (ref 3.5–6)
LEFT VENTRICULAR MASS: 261.05 G
LV LATERAL E/E' RATIO: 6.8 M/S
LV SEPTAL E/E' RATIO: 8.5 M/S
LVOT MG: 1 MMHG
LVOT MV: 0.44 CM/S
LYMPHOCYTES # BLD AUTO: 0.6 K/UL (ref 1–4.8)
LYMPHOCYTES NFR BLD: 16.6 % (ref 18–48)
MAGNESIUM SERPL-MCNC: 2.1 MG/DL (ref 1.6–2.6)
MCH RBC QN AUTO: 24 PG (ref 27–31)
MCHC RBC AUTO-ENTMCNC: 30.8 G/DL (ref 32–36)
MCV RBC AUTO: 78 FL (ref 82–98)
MONOCYTES # BLD AUTO: 0.4 K/UL (ref 0.3–1)
MONOCYTES NFR BLD: 11.3 % (ref 4–15)
MV MEAN GRADIENT: 1 MMHG
MV PEAK A VEL: 0.48 M/S
MV PEAK E VEL: 0.34 M/S
MV PEAK GRADIENT: 1 MMHG
MV STENOSIS PRESSURE HALF TIME: 82 MS
MV VALVE AREA BY CONTINUITY EQUATION: 3.07 CM2
MV VALVE AREA P 1/2 METHOD: 2.68 CM2
NEUTROPHILS # BLD AUTO: 2.3 K/UL (ref 1.8–7.7)
NEUTROPHILS NFR BLD: 67.6 % (ref 38–73)
NRBC BLD-RTO: 0 /100 WBC
OHS LV EJECTION FRACTION SIMPSONS BIPLANE MOD: 20 %
PHOSPHATE SERPL-MCNC: 3.6 MG/DL (ref 2.7–4.5)
PISA AR MAX VEL: 4.29 M/S
PLATELET # BLD AUTO: 241 K/UL (ref 150–450)
PMV BLD AUTO: 10.9 FL (ref 9.2–12.9)
POTASSIUM SERPL-SCNC: 4.1 MMOL/L (ref 3.5–5.1)
PV MV: 0.52 M/S
PV PEAK GRADIENT: 2 MMHG
PV PEAK VELOCITY: 0.75 M/S
RA PRESSURE ESTIMATED: 8 MMHG
RBC # BLD AUTO: 5.3 M/UL (ref 4.6–6.2)
RV TISSUE DOPPLER FREE WALL SYSTOLIC VELOCITY 1 (APICAL 4 CHAMBER VIEW): 11 CM/S
SODIUM SERPL-SCNC: 137 MMOL/L (ref 136–145)
TDI LATERAL: 0.05 M/S
TDI SEPTAL: 0.04 M/S
TDI: 0.05 M/S
TRICUSPID ANNULAR PLANE SYSTOLIC EXCURSION: 1.72 CM
TROPONIN I SERPL HS-MCNC: 28.1 PG/ML (ref 0–14.9)
WBC # BLD AUTO: 3.37 K/UL (ref 3.9–12.7)
Z-SCORE OF LEFT VENTRICULAR DIMENSION IN END DIASTOLE: 2.42
Z-SCORE OF LEFT VENTRICULAR DIMENSION IN END SYSTOLE: 5.32

## 2024-05-08 PROCEDURE — 96376 TX/PRO/DX INJ SAME DRUG ADON: CPT

## 2024-05-08 PROCEDURE — 93306 TTE W/DOPPLER COMPLETE: CPT

## 2024-05-08 PROCEDURE — G0378 HOSPITAL OBSERVATION PER HR: HCPCS

## 2024-05-08 PROCEDURE — 84484 ASSAY OF TROPONIN QUANT: CPT | Performed by: PHYSICAL THERAPY ASSISTANT

## 2024-05-08 PROCEDURE — 25000003 PHARM REV CODE 250: Performed by: PHYSICAL THERAPY ASSISTANT

## 2024-05-08 PROCEDURE — 93306 TTE W/DOPPLER COMPLETE: CPT | Mod: 26,,, | Performed by: STUDENT IN AN ORGANIZED HEALTH CARE EDUCATION/TRAINING PROGRAM

## 2024-05-08 PROCEDURE — 85025 COMPLETE CBC W/AUTO DIFF WBC: CPT | Performed by: PHYSICAL THERAPY ASSISTANT

## 2024-05-08 PROCEDURE — 99900035 HC TECH TIME PER 15 MIN (STAT)

## 2024-05-08 PROCEDURE — 36415 COLL VENOUS BLD VENIPUNCTURE: CPT | Performed by: PHYSICAL THERAPY ASSISTANT

## 2024-05-08 PROCEDURE — 63600175 PHARM REV CODE 636 W HCPCS: Performed by: INTERNAL MEDICINE

## 2024-05-08 PROCEDURE — 94761 N-INVAS EAR/PLS OXIMETRY MLT: CPT

## 2024-05-08 PROCEDURE — 25000003 PHARM REV CODE 250: Performed by: HOSPITALIST

## 2024-05-08 PROCEDURE — 80048 BASIC METABOLIC PNL TOTAL CA: CPT | Performed by: PHYSICAL THERAPY ASSISTANT

## 2024-05-08 PROCEDURE — 83735 ASSAY OF MAGNESIUM: CPT | Performed by: PHYSICAL THERAPY ASSISTANT

## 2024-05-08 PROCEDURE — 94760 N-INVAS EAR/PLS OXIMETRY 1: CPT

## 2024-05-08 PROCEDURE — 99900031 HC PATIENT EDUCATION (STAT)

## 2024-05-08 PROCEDURE — 84100 ASSAY OF PHOSPHORUS: CPT | Performed by: PHYSICAL THERAPY ASSISTANT

## 2024-05-08 PROCEDURE — 12000002 HC ACUTE/MED SURGE SEMI-PRIVATE ROOM

## 2024-05-08 RX ADMIN — APIXABAN 2.5 MG: 2.5 TABLET, FILM COATED ORAL at 08:05

## 2024-05-08 RX ADMIN — METOPROLOL SUCCINATE 25 MG: 25 TABLET, EXTENDED RELEASE ORAL at 08:05

## 2024-05-08 RX ADMIN — ATORVASTATIN CALCIUM 80 MG: 40 TABLET, FILM COATED ORAL at 08:05

## 2024-05-08 RX ADMIN — FUROSEMIDE 40 MG: 10 INJECTION, SOLUTION INTRAMUSCULAR; INTRAVENOUS at 09:05

## 2024-05-08 RX ADMIN — CLOPIDOGREL BISULFATE 75 MG: 75 TABLET, FILM COATED ORAL at 08:05

## 2024-05-08 RX ADMIN — FINASTERIDE 5 MG: 5 TABLET, FILM COATED ORAL at 08:05

## 2024-05-08 RX ADMIN — CETIRIZINE HYDROCHLORIDE 10 MG: 5 TABLET ORAL at 08:05

## 2024-05-08 RX ADMIN — AMIODARONE HYDROCHLORIDE 200 MG: 200 TABLET ORAL at 08:05

## 2024-05-08 RX ADMIN — SPIRONOLACTONE 25 MG: 25 TABLET ORAL at 08:05

## 2024-05-08 RX ADMIN — LEVOTHYROXINE SODIUM 112 MCG: 0.11 TABLET ORAL at 05:05

## 2024-05-08 RX ADMIN — APIXABAN 5 MG: 5 TABLET, FILM COATED ORAL at 08:05

## 2024-05-08 RX ADMIN — PANTOPRAZOLE SODIUM 40 MG: 40 TABLET, DELAYED RELEASE ORAL at 08:05

## 2024-05-08 RX ADMIN — LOSARTAN POTASSIUM 25 MG: 25 TABLET, FILM COATED ORAL at 08:05

## 2024-05-08 RX ADMIN — FUROSEMIDE 40 MG: 10 INJECTION, SOLUTION INTRAMUSCULAR; INTRAVENOUS at 08:05

## 2024-05-08 NOTE — ASSESSMENT & PLAN NOTE
Ran out of medications x 2 days PTA  BNP elevated at 1139  CXR showed chronic bilateral mixed interstitial and airspace lung opacities demonstrated are unchanged from previous exam  Patient with cardiac wheezes on admission, clear now  Cardiac diet with fluid restriction   Strict I/Os and daily weights  Updated echo pending  Monitor electrolytes and replete PRN  Tele  Mildly elevated trop I w/ uptrend, continue to monitor, low suspicion for ACS

## 2024-05-08 NOTE — ASSESSMENT & PLAN NOTE
Paced atrial rhythm on EKG  Tele  Will increase Eliquis to 5 BID as he does not meet any criteria for 2.5 mg dosing

## 2024-05-08 NOTE — HOSPITAL COURSE
"Pt appears to be at baseline, he says he feels "close" after diuresis.  Ran out of his medications 2 days ago staying in [place] w/ a friend.  Also [facility] so he's had trouble keeping [facility]. [facility] of [facility] and encouraged to keep in touch w/ that physician re: these needs.  CM looking into assistance, but pt states he's already got a solution for transport in that his niece is also a vet and will start bringing him, they will schedule their appts together if possible.  Home tomorrow probably?  TTE pending.      5/9/24  Pt looking good, ambulating about room w/o O2 and w/o cp/sob.  Feels at baseline.  He is out of some of his medications, not sure which, CM looking into it so we can refill what he needs here if possible.  Short term f/u w/ PCP then [facility] in [facility] to P[facility] visits w/ his niece providing transport.      11 pt ROS negative except as noted o/w    General:  A&O, NAD  HEENT: neck supple, PERRL/EOMI, EAC clear bilaterally, normal bilateral carotid upstroke w/o bruits, inf turbs clear bilaterally, OC/OP clear  Lungs: CTAB  CV: RRR w/o M/G/R  Abdomen: soft, NTND, no HSM, no bruits/masses/pulsations  Ext: no edema  : def  Rectal: def  Neuro: NF    "

## 2024-05-08 NOTE — NURSING
Nurses Note -- 4 Eyes      5/7/2024   10:36 PM      Skin assessed during: Admit      [x] No Altered Skin Integrity Present    []Prevention Measures Documented      [] Yes- Altered Skin Integrity Present or Discovered   [] LDA Added if Not in Epic (Describe Wound)   [] New Altered Skin Integrity was Present on Admit and Documented in LDA   [] Wound Image Taken    Wound Care Consulted? No    Attending Nurse:  Alli Hawley RN/Staff Member:   [id number]

## 2024-05-08 NOTE — ASSESSMENT & PLAN NOTE
On amiodarone  H/o thyroid surgery  Cont levothyroxine  TSH 5.7 w/ elevated fT4  Weight stable, no s/s of either hypo or hyperthyroidism right now  OPT f/u

## 2024-05-08 NOTE — CARE UPDATE
05/08/24 0717   Patient Assessment/Suction   Level of Consciousness (AVPU) alert   Respiratory Effort Unlabored   Expansion/Accessory Muscles/Retractions no use of accessory muscles   All Lung Fields Breath Sounds equal bilaterally   Rhythm/Pattern, Respiratory depth regular;unlabored;pattern regular   Cough Frequency no cough   PRE-TX-O2   Device (Oxygen Therapy) room air   SpO2 99 %   Pulse Oximetry Type Intermittent   $ Pulse Oximetry - Multiple Charge Pulse Oximetry - Multiple   Pulse 85   Resp 15   Aerosol Therapy   $ Aerosol Therapy Charges PRN treatment not required   Education   $ Education Bronchodilator;15 min   Respiratory Evaluation   $ Care Plan Tech Time 15 min

## 2024-05-08 NOTE — ASSESSMENT & PLAN NOTE
O2 walk prior to d/c  In setting of acute on chronic systolic CHF exacerbation d/t running out of medications

## 2024-05-08 NOTE — PLAN OF CARE
[facility]  Initial Discharge Assessment       Primary Care Provider: Martín Tello NP    Admission Diagnosis: Acute on chronic congestive heart failure, unspecified heart failure type [I50.9]    Admission Date: 5/7/2024  Expected Discharge Date: 5/9/2024    Transition of Care Barriers: None    Payor: HU[employer] MANAGED MEDICARE / Plan: [employer] HMO PPO SPECIAL NEEDS / Product Type: Medicare Advantage /     Extended Emergency Contact Information  Primary Emergency Contact: Kate Mitchell  Mobile Phone: [phone]  Relation: Other  Preferred language: English   needed? No  Secondary Emergency Contact: Josy Blake  Mobile Phone: [phone]  Relation: Relative  Preferred language: English   needed? No    Social work intern completed discharge assessment with Pt at bedside. Pt AAOx4s. Pt lives alone. Demographics, PCP, and insurance verified. No home health. No dialysis. Pt completes ADLs with a BP machine. Pt to discharge home via Kate Mitchell (Relative) [phone]  Pt has no other needs to be addressed at this time.     Discharge Plan A: Home  Discharge Plan B: Home      [id number] - [place], [place] - [address]  [address]  [place] [address]  Phone: [phone] Fax: [phone]    [facility] - [facility] [address] [facility]  [address] [facility] [address]  Phone: [phone] Fax: [phone]      Initial Assessment (most recent)       Adult Discharge Assessment - 05/08/24 1342          Discharge Assessment    Assessment Type Discharge Planning Assessment     Confirmed/corrected address, phone number and insurance Yes     Confirmed Demographics Correct on Facesheet     Source of Information patient     When was your last doctors appointment? 03/06/24     Communicated ELISA with patient/caregiver Yes     Reason For Admission Acute on chronic heart failure     People in Home alone     Do you expect to return to  your current living situation? Yes     Do you have help at home or someone to help you manage your care at home? Yes     Who are your caregiver(s) and their phone number(s)? Stephen Kate (Relative)  [phone]     Prior to hospitilization cognitive status: Unable to Assess     Current cognitive status: Alert/Oriented     Walking or Climbing Stairs Difficulty no     Dressing/Bathing Difficulty no     Home Accessibility wheelchair accessible     Home Layout Able to live on 1st floor     Equipment Currently Used at Home blood pressure machine     Readmission within 30 days? No     Patient currently being followed by outpatient case management? No     Do you currently have service(s) that help you manage your care at home? No     Do you take prescription medications? Yes     Do you have prescription coverage? Yes     Coverage Payor: HUMANA MANAGED MEDICARE - [employer] [facility] HMO PPO SPECIAL NEEDS -     Do you have any problems affording any of your prescribed medications? No     Is the patient taking medications as prescribed? yes     Who is going to help you get home at discharge? Stephen Kate (Relative) [phone]     How do you get to doctors appointments? family or friend will provide     Are you on dialysis? No     Do you take coumadin? No     Discharge Plan A Home     Discharge Plan B Home     DME Needed Upon Discharge  none     Discharge Plan discussed with: Patient     Transition of Care Barriers None

## 2024-05-08 NOTE — SUBJECTIVE & OBJECTIVE
Interval History:     5/8/24  Assume care today, pt seen and examined, chart reviewed.  Pt feeling near but not quite at baseline.  TTE  pending. Denies CP, mild SOB - no n/v/abd pain/f/c.      Review of Systems    11 pt ROS negative except as noted o/w    Objective:     Vital Signs (Most Recent):  Temp: 97.5 °F (36.4 °C) (05/08/24 1150)  Pulse: 74 (05/08/24 1150)  Resp: 18 (05/08/24 1150)  BP: 103/71 (05/08/24 1150)  SpO2: 97 % (05/08/24 1150) Vital Signs (24h Range):  Temp:  [97.4 °F (36.3 °C)-98.4 °F (36.9 °C)] 97.5 °F (36.4 °C)  Pulse:  [70-87] 74  Resp:  [15-48] 18  SpO2:  [90 %-100 %] 97 %  BP: (103-167)/() 103/71     Weight: 63.5 kg (140 lb 0.1 oz)  Body mass index is 21.29 kg/m².    Intake/Output Summary (Last 24 hours) at 5/8/2024 1209  Last data filed at 5/8/2024 0906  Gross per 24 hour   Intake 354 ml   Output 2000 ml   Net -1646 ml         Physical Exam      General:  A&O, NAD.  Has O2 NC on, does not use at home  HEENT: neck supple, PERRL/EOMI, EAC clear bilaterally, normal bilateral carotid upstroke w/o bruits, inf turbs clear bilaterally, OC/OP clear, no JVD  Lungs: CTAB  CV: RRR w/o M/G/R  Abdomen: soft, NTND, no HSM, no bruits/masses/pulsations  Ext: no edema  : def  Rectal: def  Neuro: NF    Significant Labs: All pertinent labs within the past 24 hours have been reviewed.    Significant Imaging: I have reviewed all pertinent imaging results/findings within the past 24 hours.

## 2024-05-08 NOTE — ASSESSMENT & PLAN NOTE
Imaging noted  Needs ongoing OPT f/u w/ PCP, pulm  O2 on this AM but pt says he does not need it  O2 walk prior to d/c

## 2024-05-08 NOTE — CONSULTS
"Pt provided with education materials on "Heart Failure Nutrition Therapy" and Fluid rrestriction handout" Pt was educated on the importance of decreasing sodium and high fat foods in their diet, managing body weight, and limiting fluid intake. Pt was educated on the importance of reading food labels and nutrient labels to ensure purchasing food items low in salt and fat. The pt was also instructed to avoid processed foods, to purchase canned vegetables with no salt added and frozen vegetables with no sauce or creams added. To limit processed meat such as sausage, lau, and hot dogs and choosing leaner guts of meat. Pt was educated on cooking techniques to lower salt use. RD provided a list of Heart Healthy foods that are recommended and foods that are not recommended in a heart healthy diet. The patient was given a heart healthy sample menu. Lastly, the patient was given a weight log in order to log weight loss and intake. Pt understood the diet instructions and plans to make changes in his diet at home. Contact information for any further questions provided.    If further education is needed, consult nutrition.    "

## 2024-05-09 ENCOUNTER — TELEPHONE (OUTPATIENT)
Dept: CARDIOLOGY | Facility: CLINIC | Age: 74
End: 2024-05-09
Payer: MEDICARE

## 2024-05-09 VITALS
BODY MASS INDEX: 21.22 KG/M2 | DIASTOLIC BLOOD PRESSURE: 78 MMHG | WEIGHT: 140 LBS | SYSTOLIC BLOOD PRESSURE: 115 MMHG | HEIGHT: 68 IN | OXYGEN SATURATION: 99 % | HEART RATE: 74 BPM | TEMPERATURE: 98 F | RESPIRATION RATE: 17 BRPM

## 2024-05-09 LAB
ANION GAP SERPL CALC-SCNC: 7 MMOL/L (ref 8–16)
BASOPHILS # BLD AUTO: 0.04 K/UL (ref 0–0.2)
BASOPHILS NFR BLD: 1.3 % (ref 0–1.9)
BUN SERPL-MCNC: 31 MG/DL (ref 8–23)
CALCIUM SERPL-MCNC: 9.5 MG/DL (ref 8.7–10.5)
CHLORIDE SERPL-SCNC: 100 MMOL/L (ref 95–110)
CO2 SERPL-SCNC: 30 MMOL/L (ref 23–29)
CREAT SERPL-MCNC: 1.2 MG/DL (ref 0.5–1.4)
DIFFERENTIAL METHOD BLD: ABNORMAL
EOSINOPHIL # BLD AUTO: 0.1 K/UL (ref 0–0.5)
EOSINOPHIL NFR BLD: 4.4 % (ref 0–8)
ERYTHROCYTE [DISTWIDTH] IN BLOOD BY AUTOMATED COUNT: 14.4 % (ref 11.5–14.5)
EST. GFR  (NO RACE VARIABLE): >60 ML/MIN/1.73 M^2
GLUCOSE SERPL-MCNC: 91 MG/DL (ref 70–110)
HCT VFR BLD AUTO: 43.3 % (ref 40–54)
HGB BLD-MCNC: 13.4 G/DL (ref 14–18)
IMM GRANULOCYTES # BLD AUTO: 0.01 K/UL (ref 0–0.04)
IMM GRANULOCYTES NFR BLD AUTO: 0.3 % (ref 0–0.5)
LYMPHOCYTES # BLD AUTO: 0.7 K/UL (ref 1–4.8)
LYMPHOCYTES NFR BLD: 21.3 % (ref 18–48)
MAGNESIUM SERPL-MCNC: 2 MG/DL (ref 1.6–2.6)
MCH RBC QN AUTO: 24.1 PG (ref 27–31)
MCHC RBC AUTO-ENTMCNC: 30.9 G/DL (ref 32–36)
MCV RBC AUTO: 78 FL (ref 82–98)
MONOCYTES # BLD AUTO: 0.4 K/UL (ref 0.3–1)
MONOCYTES NFR BLD: 12.5 % (ref 4–15)
NEUTROPHILS # BLD AUTO: 1.9 K/UL (ref 1.8–7.7)
NEUTROPHILS NFR BLD: 60.2 % (ref 38–73)
NRBC BLD-RTO: 0 /100 WBC
PHOSPHATE SERPL-MCNC: 4.5 MG/DL (ref 2.7–4.5)
PLATELET # BLD AUTO: 270 K/UL (ref 150–450)
PMV BLD AUTO: 11.3 FL (ref 9.2–12.9)
POTASSIUM SERPL-SCNC: 4.4 MMOL/L (ref 3.5–5.1)
RBC # BLD AUTO: 5.55 M/UL (ref 4.6–6.2)
SODIUM SERPL-SCNC: 137 MMOL/L (ref 136–145)
WBC # BLD AUTO: 3.19 K/UL (ref 3.9–12.7)

## 2024-05-09 PROCEDURE — 80048 BASIC METABOLIC PNL TOTAL CA: CPT | Performed by: PHYSICAL THERAPY ASSISTANT

## 2024-05-09 PROCEDURE — 83735 ASSAY OF MAGNESIUM: CPT | Performed by: PHYSICAL THERAPY ASSISTANT

## 2024-05-09 PROCEDURE — 99900035 HC TECH TIME PER 15 MIN (STAT)

## 2024-05-09 PROCEDURE — 94761 N-INVAS EAR/PLS OXIMETRY MLT: CPT

## 2024-05-09 PROCEDURE — 25000003 PHARM REV CODE 250: Performed by: HOSPITALIST

## 2024-05-09 PROCEDURE — 85025 COMPLETE CBC W/AUTO DIFF WBC: CPT | Performed by: PHYSICAL THERAPY ASSISTANT

## 2024-05-09 PROCEDURE — 36415 COLL VENOUS BLD VENIPUNCTURE: CPT | Performed by: PHYSICAL THERAPY ASSISTANT

## 2024-05-09 PROCEDURE — 63600175 PHARM REV CODE 636 W HCPCS: Performed by: INTERNAL MEDICINE

## 2024-05-09 PROCEDURE — 25000003 PHARM REV CODE 250: Performed by: PHYSICAL THERAPY ASSISTANT

## 2024-05-09 PROCEDURE — 84100 ASSAY OF PHOSPHORUS: CPT | Performed by: PHYSICAL THERAPY ASSISTANT

## 2024-05-09 PROCEDURE — G0378 HOSPITAL OBSERVATION PER HR: HCPCS

## 2024-05-09 PROCEDURE — 99900031 HC PATIENT EDUCATION (STAT)

## 2024-05-09 RX ORDER — FOLIC ACID 1 MG/1
1 TABLET ORAL DAILY
Qty: 30 TABLET | Refills: 0 | Status: SHIPPED | OUTPATIENT
Start: 2024-05-09

## 2024-05-09 RX ORDER — FERROUS SULFATE 325(65) MG
325 TABLET ORAL DAILY
Qty: 30 TABLET | Refills: 0 | Status: SHIPPED | OUTPATIENT
Start: 2024-05-09

## 2024-05-09 RX ORDER — FUROSEMIDE 20 MG/1
20 TABLET ORAL DAILY
Qty: 30 TABLET | Refills: 0 | Status: SHIPPED | OUTPATIENT
Start: 2024-05-09

## 2024-05-09 RX ORDER — DOCUSATE SODIUM 100 MG/1
100 CAPSULE, LIQUID FILLED ORAL DAILY PRN
Qty: 30 CAPSULE | Refills: 0 | Status: SHIPPED | OUTPATIENT
Start: 2024-05-09

## 2024-05-09 RX ORDER — LEFLUNOMIDE 20 MG/1
20 TABLET ORAL DAILY
Qty: 30 TABLET | Refills: 0 | Status: SHIPPED | OUTPATIENT
Start: 2024-05-09

## 2024-05-09 RX ORDER — POTASSIUM CHLORIDE 20 MEQ/15ML
20 SOLUTION ORAL DAILY
Qty: 300 ML | Refills: 0 | Status: SHIPPED | OUTPATIENT
Start: 2024-05-09 | End: 2024-06-08

## 2024-05-09 RX ORDER — ATORVASTATIN CALCIUM 80 MG/1
80 TABLET, FILM COATED ORAL NIGHTLY
Qty: 30 TABLET | Refills: 0 | Status: SHIPPED | OUTPATIENT
Start: 2024-05-09

## 2024-05-09 RX ORDER — GABAPENTIN 600 MG/1
600 TABLET ORAL 3 TIMES DAILY
Qty: 90 TABLET | Refills: 0 | Status: SHIPPED | OUTPATIENT
Start: 2024-05-09

## 2024-05-09 RX ORDER — CLOPIDOGREL BISULFATE 75 MG/1
75 TABLET ORAL DAILY
Qty: 30 TABLET | Refills: 0 | Status: SHIPPED | OUTPATIENT
Start: 2024-05-09

## 2024-05-09 RX ORDER — LOSARTAN POTASSIUM 25 MG/1
25 TABLET ORAL DAILY
Qty: 30 TABLET | Refills: 0 | Status: SHIPPED | OUTPATIENT
Start: 2024-05-09

## 2024-05-09 RX ORDER — AMIODARONE HYDROCHLORIDE 200 MG/1
200 TABLET ORAL DAILY
Qty: 30 TABLET | Refills: 0 | Status: SHIPPED | OUTPATIENT
Start: 2024-05-09

## 2024-05-09 RX ORDER — SPIRONOLACTONE 25 MG/1
25 TABLET ORAL DAILY
Qty: 30 TABLET | Refills: 0 | Status: SHIPPED | OUTPATIENT
Start: 2024-05-09

## 2024-05-09 RX ORDER — METOPROLOL SUCCINATE 25 MG/1
25 TABLET, EXTENDED RELEASE ORAL DAILY
Qty: 30 TABLET | Refills: 0 | Status: SHIPPED | OUTPATIENT
Start: 2024-05-09

## 2024-05-09 RX ORDER — FINASTERIDE 5 MG/1
5 TABLET, FILM COATED ORAL DAILY
Qty: 30 TABLET | Refills: 0 | Status: SHIPPED | OUTPATIENT
Start: 2024-05-09

## 2024-05-09 RX ORDER — LORATADINE 10 MG/1
10 TABLET ORAL DAILY
Qty: 30 TABLET | Refills: 0 | Status: SHIPPED | OUTPATIENT
Start: 2024-05-09

## 2024-05-09 RX ADMIN — AMIODARONE HYDROCHLORIDE 200 MG: 200 TABLET ORAL at 08:05

## 2024-05-09 RX ADMIN — METOPROLOL SUCCINATE 25 MG: 25 TABLET, EXTENDED RELEASE ORAL at 08:05

## 2024-05-09 RX ADMIN — FINASTERIDE 5 MG: 5 TABLET, FILM COATED ORAL at 08:05

## 2024-05-09 RX ADMIN — PANTOPRAZOLE SODIUM 40 MG: 40 TABLET, DELAYED RELEASE ORAL at 08:05

## 2024-05-09 RX ADMIN — FUROSEMIDE 40 MG: 10 INJECTION, SOLUTION INTRAMUSCULAR; INTRAVENOUS at 08:05

## 2024-05-09 RX ADMIN — APIXABAN 5 MG: 5 TABLET, FILM COATED ORAL at 08:05

## 2024-05-09 RX ADMIN — CETIRIZINE HYDROCHLORIDE 10 MG: 5 TABLET ORAL at 08:05

## 2024-05-09 RX ADMIN — LOSARTAN POTASSIUM 25 MG: 25 TABLET, FILM COATED ORAL at 08:05

## 2024-05-09 RX ADMIN — LEVOTHYROXINE SODIUM 112 MCG: 0.11 TABLET ORAL at 06:05

## 2024-05-09 RX ADMIN — SPIRONOLACTONE 25 MG: 25 TABLET ORAL at 08:05

## 2024-05-09 RX ADMIN — CLOPIDOGREL BISULFATE 75 MG: 75 TABLET, FILM COATED ORAL at 08:05

## 2024-05-09 NOTE — TELEPHONE ENCOUNTER
----- Message from Tere Herrera RN sent at 5/9/2024  9:57 AM CDT -----  Regarding: hospital follow up  Hi, patient being discharged from [facility] today and will need a follow up visit. Patient was admitted for chf exacerbation and will need to be seen within a week. There are no appointments available for me to schedule. Please contact patient to arrange appointment.

## 2024-05-09 NOTE — DISCHARGE SUMMARY
"[facility]  Discharge Summary      Patient Name: Trevor Madrid  MRN: [id number]  BRYAN: [id number]  Patient Class: IP- Inpatient  Admission Date: 5/7/2024  Hospital Length of Stay: 1 days  Discharge Date and Time:  05/09/2024 9:51 AM  Attending Physician: Rich Leggett MD   Discharging Provider: Rich Leggett MD  Primary Care Provider: Martín Tello NP    Primary Care Team: Networked reference to record PCT     HPI:   Pt is a 74 year old male with a PMH of COPD, CHF, sarcoidosis, Afib on eliquis, and AICD. Pt present to the ED today with complaints of SOB and cough worsening over the last two days. Pt states he has taken his medications regularly as prescribed but did not take meds today. Pt states he has been out of eliquis for approximately 2 days. BLE ultrasound completed without signs of acute DVT. Pt states he follows with cardiology but missed his last appointment. Pt positive for orthopnea and started on 2L O2 per nasal cannula due to hypoxia in 80s. Pt denies being on regular O2 at home. Pt does report cough with minimal sputum production. Pt negative for covid and flu. Pt is a poor historian. Last echo in 2022 with EF of 20%. Pt denies abdominal pain, nausea, vomiting, fever, chills, chest pain, or LE edema. With presentation and labs, will admit to hospital medicine for acute heart failure exacerbation. Pt is full code.        * No surgery found *      Hospital Course:   Pt appears to be at baseline, he says he feels "close" after diuresis.  Ran out of his medications 2 days ago staying in [place] w/ a friend.  Also [facility] so he's had trouble [facility]. [facility] and encouraged to keep in touch w/ that physician re: these needs.  CM looking into assistance, but pt states he's already got a solution for transport in that his niece is also a vet and will start bringing him, they will schedule " their appts together if possible.  Home tomorrow probably?  TTE pending.      5/9/24  Pt looking good, ambulating about room w/o O2 and w/o cp/sob.  Feels at baseline.  He is out of some of his medications, not sure which, CM looking into it so we can refill what he needs here if possible.  Short term f/u w/ PCP then [facility] in [facility] to PCP visits w/ his niece providing transport.      11 pt ROS negative except as noted o/w    General:  A&O, NAD  HEENT: neck supple, PERRL/EOMI, EAC clear bilaterally, normal bilateral carotid upstroke w/o bruits, inf turbs clear bilaterally, OC/OP clear  Lungs: CTAB  CV: RRR w/o M/G/R  Abdomen: soft, NTND, no HSM, no bruits/masses/pulsations  Ext: no edema  : def  Rectal: def  Neuro: NF       Goals of Care Treatment Preferences:  Code Status: Full Code      Consults:   Consults (From admission, onward)          Status Ordering Provider     Inpatient consult to Social Work/Case Management  Once        Provider:  (Not yet assigned)    Completed KERRI NELSON     Inpatient consult to Registered Dietitian/Nutritionist  Once        Provider:  (Not yet assigned)    Completed KERRI NELSON            Pulmonary  Acute hypoxemic respiratory failure  In setting of acute on chronic systolic CHF exacerbation d/t running out of medications  Underlying pulmonary sarcoidosis  Lungs clear    Cardiac/Vascular  * Acute on chronic heart failure  Ran out of medications x 2 days PTA  BNP elevated at 1139  CXR showed chronic bilateral mixed interstitial and airspace lung opacities demonstrated are unchanged from previous exam  Patient with cardiac wheezes on admission, clear now  Cardiac diet with fluid restriction   Strict I/Os and daily weights  Updated echo appears unchanged from 2022    Left Ventricle: The left ventricle is mildly dilated. Mildly increased wall thickness. Severe global hypokinesis present. There is severely reduced systolic function with a visually  estimated ejection fraction of 15 - 20%. There is diastolic dysfunction but grade cannot be determined.    Right Ventricle: Normal right ventricular cavity size. Systolic function is normal.    Left Atrium: Left atrium is moderately dilated.    Right Atrium: Right atrium is mildly dilated.    Aortic Valve: . There is severe aortic regurgitation.    Mitral Valve:  There is mild regurgitation.    Pulmonic Valve: There is moderate regurgitation.    IVC/SVC: Intermediate venous pressure at 8 mmHg.  Monitor electrolytes and replete PRN  Tele  Mildly elevated trop I w/ uptrend, low suspicion for ACS, likely demand ischemia  [facility]    ICD (implantable cardioverter-defibrillator), dual, in situ  Pacemaker interrogation planned  No issues on tele  OPT f/u w/ cards      Hypertension  CM reviewing pt home meds w/ him  D/c on home meds for HTN    Paroxysmal atrial fibrillation  Paced atrial rhythm on EKG  Increased Eliquis to 5 BID as he does not meet any criteria for 2.5 mg dosing  OPT PCP and cards f/u    Oncology  Anemia  Very mild  OPT f/u    Leukopenia  OPT f/u  No evidence of sepsis  Don't suspect primary failure of hematopoiesis    Endocrine  Thyroid disease  On amiodarone  H/o thyroid surgery  Cont levothyroxine  TSH 5.7 w/ elevated fT4  Weight stable, no s/s of either hypo or hyperthyroidism right now  OPT f/u      Other  Sarcoidosis  Imaging noted  Needs ongoing OPT f/u w/ PCP, pulm  Pt was requiring O2 when in CHF but now off O2 and having no problems at all breathing and ambulating        Final Active Diagnoses:    Diagnosis Date Noted POA    PRINCIPAL PROBLEM:  Acute on chronic heart failure [I50.9] 01/29/2020 Yes    Thyroid disease [E07.9] 12/08/2022 Yes    Hypertension [I10] 05/10/2021 Yes    ICD (implantable cardioverter-defibrillator), dual, in situ [Z95.810] 05/10/2021 Yes    Anemia [D64.9] 05/25/2020 Yes    Acute hypoxemic respiratory failure [J96.01] 05/25/2020 Yes    Paroxysmal atrial  fibrillation [I48.0] 05/25/2020 Yes    Sarcoidosis [D86.9] 05/24/2020 Yes    Leukopenia [D72.819] 05/24/2020 Yes      Problems Resolved During this Admission:       Discharged Condition: stable    Disposition:     Follow Up:   Follow-up Information       Martín Tello NP Follow up in 3 day(s).    Specialty: Family Medicine  Contact information:  [address] [facility] [address] [facility] [place] [address]               Nathaniel West MD. Call.    Specialties: Interventional Cardiology, Cardiology  Why: office will contact patient to arrange visit.  Contact information:  [address]  [facility]  [facility] [address]  [phone]                           Patient Instructions:   No discharge procedures on file.    Significant Diagnostic Studies: Labs: All labs within the past 24 hours have been reviewed    Pending Diagnostic Studies:       None           Medications:  Reconciled Home Medications:      Medication List        START taking these medications      potassium chloride 10% 20 mEq/15 mL oral solution  Commonly known as: KAYCIEL  Take 15 mLs (20 mEq total) by mouth once daily.  Replaces: potassium chloride 20 mEq Pack            CHANGE how you take these medications      apixaban 5 mg Tab  Commonly known as: ELIQUIS  Take 1 tablet (5 mg total) by mouth 2 (two) times daily. (Dose increased from 2.5 to 5 mg daily) To prevent blood clots/stroke  What changed:   medication strength  how much to take  additional instructions     docusate sodium 100 MG capsule  Commonly known as: COLACE  Take 1 capsule (100 mg total) by mouth daily as needed for Constipation.  What changed: reasons to take this     folic acid 1 MG tablet  Commonly known as: FOLVITE  Take 1 tablet (1 mg total) by mouth once daily.  What changed: when to take this            CONTINUE taking these medications      acetaminophen 500 MG tablet  Commonly known as: TYLENOL  Take 500 mg by mouth every 6 (six) hours as needed for Pain.      albuterol 90 mcg/actuation inhaler  Commonly known as: PROVENTIL/VENTOLIN HFA  Inhale 2 puffs into the lungs once daily.     amiodarone 200 MG Tab  Commonly known as: PACERONE  Take 1 tablet (200 mg total) by mouth once daily.     ascorbic acid (vitamin C) 500 MG tablet  Commonly known as: VITAMIN C  Take 500 mg by mouth once daily.     atorvastatin 80 MG tablet  Commonly known as: LIPITOR  Take 1 tablet (80 mg total) by mouth every evening.     clopidogreL 75 mg tablet  Commonly known as: PLAVIX  Take 1 tablet (75 mg total) by mouth once daily.     ferrous sulfate 325 mg (65 mg iron) Tab tablet  Commonly known as: FEOSOL  Take 1 tablet (325 mg total) by mouth once daily.     finasteride 5 mg tablet  Commonly known as: PROSCAR  Take 1 tablet (5 mg total) by mouth once daily.     furosemide 20 MG tablet  Commonly known as: LASIX  Take 1 tablet (20 mg total) by mouth once daily.     gabapentin 600 MG tablet  Commonly known as: NEURONTIN  Take 1 tablet (600 mg total) by mouth 3 (three) times daily.     leflunomide 20 MG Tab  Commonly known as: ARAVA  Take 1 tablet (20 mg total) by mouth once daily.     levothyroxine 112 MCG tablet  Commonly known as: SYNTHROID  Take 1 tablet (112 mcg total) by mouth before breakfast. Take on empty stomach an hour before another other medications.     loratadine 10 mg tablet  Commonly known as: CLARITIN  Take 1 tablet (10 mg total) by mouth once daily.     losartan 25 MG tablet  Commonly known as: COZAAR  Take 1 tablet (25 mg total) by mouth once daily.     methotrexate 2.5 MG Tab  Take 2.5 mg by mouth every 7 days.     metoprolol succinate 25 MG 24 hr tablet  Commonly known as: TOPROL-XL  Take 1 tablet (25 mg total) by mouth once daily.     nitroGLYCERIN 0.4 MG SL tablet  Commonly known as: NITROSTAT  Place 0.4 mg under the tongue every 5 (five) minutes as needed for Chest pain.     pantoprazole 40 MG tablet  Commonly known as: PROTONIX  Take 40 mg by mouth once daily.      prednisoLONE acetate 1 % Drps  Commonly known as: PRED FORTE  1 drop 4 (four) times daily as needed.     sildenafiL 50 MG tablet  Commonly known as: VIAGRA  Take 25 mg by mouth daily as needed.     spironolactone 25 MG tablet  Commonly known as: ALDACTONE  Take 1 tablet (25 mg total) by mouth once daily.            STOP taking these medications      potassium chloride 20 mEq Pack  Commonly known as: KLOR-CON  Replaced by: potassium chloride 10% 20 mEq/15 mL oral solution              Indwelling Lines/Drains at time of discharge:   Lines/Drains/Airways       None                   Time spent on the discharge of patient:  > 30 minutes         Rich Leggett MD  Department of Hospital Medicine  [facility]

## 2024-05-09 NOTE — CARE UPDATE
05/08/24 2000   Patient Assessment/Suction   Level of Consciousness (AVPU) alert   Respiratory Effort Normal;Unlabored   Expansion/Accessory Muscles/Retractions no retractions;no use of accessory muscles   All Lung Fields Breath Sounds clear;equal bilaterally   Rhythm/Pattern, Respiratory unlabored;pattern regular   Cough Frequency no cough   PRE-TX-O2   Device (Oxygen Therapy) room air   SpO2 97 %   Pulse Oximetry Type Intermittent   $ Pulse Oximetry - Single Charge Pulse Oximetry - Single

## 2024-05-09 NOTE — ASSESSMENT & PLAN NOTE
Imaging noted  Needs ongoing OPT f/u w/ PCP, pulm  Pt was requiring O2 when in CHF but now off O2 and having no problems at all breathing and ambulating

## 2024-05-09 NOTE — PLAN OF CARE
Patient cleared for discharge by case management pending prescription pickup / delivery from [facility] pharmacy to home no needs.        05/09/24 1058   Final Note   Assessment Type Final Discharge Note   Anticipated Discharge Disposition Home   Hospital Resources/Appts/Education Provided Appointments scheduled and added to AVS

## 2024-05-09 NOTE — PLAN OF CARE
Attempted to schedule cardiology follow up visit, no appointments available until June 2024. In basket message sent to Dr. LINDA West's office to contact patient to arrange follow up visit.

## 2024-05-09 NOTE — CARE UPDATE
05/09/24 0948   Patient Assessment/Suction   Level of Consciousness (AVPU) alert   Respiratory Effort Normal;Unlabored   Expansion/Accessory Muscles/Retractions no use of accessory muscles   All Lung Fields Breath Sounds clear   Rhythm/Pattern, Respiratory no shortness of breath reported   Cough Frequency no cough   PRE-TX-O2   Device (Oxygen Therapy) room air   SpO2 (!) 94 %   Pulse Oximetry Type Intermittent   $ Pulse Oximetry - Multiple Charge Pulse Oximetry - Multiple   Pulse 83   Resp 17   Aerosol Therapy   $ Aerosol Therapy Charges PRN treatment not required   Respiratory Treatment Status (SVN) PRN treatment not required   Education   $ Education Bronchodilator;15 min

## 2024-05-09 NOTE — NURSING
RN Navigator met with patient at bedside to discuss scheduling tcc/hospital follow up appt. upon discharge. Pt is A&Ox4 and sitting up on the side of the bed just finishing his lunch.    Pt is agreeable to schedule hospital follow up appt at [facility]. RN Navigator informed pt of scheduled appointment date and time, and patient reminded to bring portable home O2 if used, as well as all medication bottles to Discharge Clinic follow up appointment.  Discharge Clinic information handout, appointment card/letter provided to patient.  Pt. reminded to call [facility] Contact number, [phone], with any questions or if appointment needs to be rescheduled.

## 2024-05-09 NOTE — ASSESSMENT & PLAN NOTE
Ran out of medications x 2 days PTA  BNP elevated at 1139  CXR showed chronic bilateral mixed interstitial and airspace lung opacities demonstrated are unchanged from previous exam  Patient with cardiac wheezes on admission, clear now  Cardiac diet with fluid restriction   Strict I/Os and daily weights  Updated echo appears unchanged from 2022    Left Ventricle: The left ventricle is mildly dilated. Mildly increased wall thickness. Severe global hypokinesis present. There is severely reduced systolic function with a visually estimated ejection fraction of 15 - 20%. There is diastolic dysfunction but grade cannot be determined.    Right Ventricle: Normal right ventricular cavity size. Systolic function is normal.    Left Atrium: Left atrium is moderately dilated.    Right Atrium: Right atrium is mildly dilated.    Aortic Valve: . There is severe aortic regurgitation.    Mitral Valve:  There is mild regurgitation.    Pulmonic Valve: There is moderate regurgitation.    IVC/SVC: Intermediate venous pressure at 8 mmHg.  Monitor electrolytes and replete PRN  Tele  Mildly elevated trop I w/ uptrend, low suspicion for ACS, likely demand ischemia  [facility]

## 2024-05-09 NOTE — ASSESSMENT & PLAN NOTE
In setting of acute on chronic systolic CHF exacerbation d/t running out of medications  Underlying pulmonary sarcoidosis  Lungs clear

## 2024-05-09 NOTE — ASSESSMENT & PLAN NOTE
Paced atrial rhythm on EKG  Increased Eliquis to 5 BID as he does not meet any criteria for 2.5 mg dosing  OPT PCP and cards f/u

## 2024-05-14 ENCOUNTER — PATIENT OUTREACH (OUTPATIENT)
Dept: ADMINISTRATIVE | Facility: CLINIC | Age: 74
End: 2024-05-14
Payer: MEDICARE

## 2024-05-15 NOTE — PROGRESS NOTES
C3 nurse attempted to contact Trevor Madrid for a TCC post hospital discharge follow up call. No answer, left a voicemail with callback information.    The patient has a scheduled HOSFU with [facility] on 5/20/24 at 1100. No messages routed at this time.

## 2024-05-20 ENCOUNTER — LAB VISIT (OUTPATIENT)
Dept: LAB | Facility: HOSPITAL | Age: 74
End: 2024-05-20
Attending: NURSE PRACTITIONER
Payer: MEDICARE

## 2024-05-20 ENCOUNTER — OFFICE VISIT (OUTPATIENT)
Dept: PRIMARY CARE CLINIC | Facility: CLINIC | Age: 74
End: 2024-05-20
Payer: MEDICARE

## 2024-05-20 VITALS
HEART RATE: 80 BPM | OXYGEN SATURATION: 93 % | TEMPERATURE: 98 F | HEIGHT: 68 IN | SYSTOLIC BLOOD PRESSURE: 112 MMHG | WEIGHT: 141.88 LBS | BODY MASS INDEX: 21.5 KG/M2 | DIASTOLIC BLOOD PRESSURE: 60 MMHG

## 2024-05-20 DIAGNOSIS — E07.9 THYROID DISEASE: ICD-10-CM

## 2024-05-20 DIAGNOSIS — M06.9 RHEUMATOID ARTHRITIS, INVOLVING UNSPECIFIED SITE, UNSPECIFIED WHETHER RHEUMATOID FACTOR PRESENT: ICD-10-CM

## 2024-05-20 DIAGNOSIS — E89.0 H/O THYROIDECTOMY: ICD-10-CM

## 2024-05-20 DIAGNOSIS — D86.9 SARCOIDOSIS: ICD-10-CM

## 2024-05-20 DIAGNOSIS — I77.811 ABDOMINAL AORTIC ECTASIA: ICD-10-CM

## 2024-05-20 DIAGNOSIS — I50.23 ACUTE ON CHRONIC SYSTOLIC HEART FAILURE: Primary | ICD-10-CM

## 2024-05-20 DIAGNOSIS — D72.819 LEUKOPENIA, UNSPECIFIED TYPE: ICD-10-CM

## 2024-05-20 DIAGNOSIS — D64.9 ANEMIA, UNSPECIFIED TYPE: ICD-10-CM

## 2024-05-20 DIAGNOSIS — I48.0 PAROXYSMAL ATRIAL FIBRILLATION: ICD-10-CM

## 2024-05-20 PROCEDURE — 99204 OFFICE O/P NEW MOD 45 MIN: CPT | Mod: S$GLB,,, | Performed by: NURSE PRACTITIONER

## 2024-05-20 PROCEDURE — 36415 COLL VENOUS BLD VENIPUNCTURE: CPT | Performed by: NURSE PRACTITIONER

## 2024-05-20 PROCEDURE — 1126F AMNT PAIN NOTED NONE PRSNT: CPT | Mod: CPTII,S$GLB,, | Performed by: NURSE PRACTITIONER

## 2024-05-20 PROCEDURE — 1159F MED LIST DOCD IN RCRD: CPT | Mod: CPTII,S$GLB,, | Performed by: NURSE PRACTITIONER

## 2024-05-20 PROCEDURE — 1111F DSCHRG MED/CURRENT MED MERGE: CPT | Mod: CPTII,S$GLB,, | Performed by: NURSE PRACTITIONER

## 2024-05-20 PROCEDURE — 3074F SYST BP LT 130 MM HG: CPT | Mod: CPTII,S$GLB,, | Performed by: NURSE PRACTITIONER

## 2024-05-20 PROCEDURE — 4010F ACE/ARB THERAPY RXD/TAKEN: CPT | Mod: CPTII,S$GLB,, | Performed by: NURSE PRACTITIONER

## 2024-05-20 PROCEDURE — 3078F DIAST BP <80 MM HG: CPT | Mod: CPTII,S$GLB,, | Performed by: NURSE PRACTITIONER

## 2024-05-20 PROCEDURE — 1101F PT FALLS ASSESS-DOCD LE1/YR: CPT | Mod: CPTII,S$GLB,, | Performed by: NURSE PRACTITIONER

## 2024-05-20 PROCEDURE — 84481 FREE ASSAY (FT-3): CPT | Performed by: NURSE PRACTITIONER

## 2024-05-20 PROCEDURE — 99999 PR PBB SHADOW E&M-EST. PATIENT-LVL IV: CPT | Mod: PBBFAC,,, | Performed by: NURSE PRACTITIONER

## 2024-05-20 PROCEDURE — 3288F FALL RISK ASSESSMENT DOCD: CPT | Mod: CPTII,S$GLB,, | Performed by: NURSE PRACTITIONER

## 2024-05-20 NOTE — PROGRESS NOTES
This dictation has been generated using Modal Fluency Dictation some phonetic errors may occur. Please contact author for clarification if needed.     Problem List Items Addressed This Visit       Acute on chronic heart failure - Primary    Relevant Orders    Ambulatory referral/consult to Outpatient Case Management    Ambulatory referral/consult to Cardiology    Sarcoidosis    Leukopenia    Anemia    Paroxysmal atrial fibrillation    Relevant Orders    Ambulatory referral/consult to Outpatient Case Management    Ambulatory referral/consult to Cardiology    H/O thyroidectomy    Relevant Orders    T3, Free    Thyroid disease    Relevant Orders    T3, Free    Rheumatoid arthritis, involving unspecified site, unspecified whether rheumatoid factor present    Abdominal aortic ectasia       Orders Placed This Encounter    T3, Free    Ambulatory referral/consult to Outpatient Case Management    Ambulatory referral/consult to Cardiology     [facility]  [facility]  Leukopenia stable  Anemia scopes UTD.   Thyroid disease. Amiodarone and OOM. Weight stable. [facility] 2-3 months. Recheck labs.   [facility]. May need transportation.   Rheumatoid arthritis stable and controlled. Continue therapy Arava  Abd Aortic ectasia incidental finding on imaging. Continue lipitor and plavix.       Follow up in about 2 weeks (around 6/3/2024).    ________________________________________________________________  ________________________________________________________________      No chief complaint on file.    History of present illness    Transitional Care Note    Family and/or Caretaker present at visit?  No.  Diagnostic tests reviewed/disposition: No diagnosic tests pending after this hospitalization.  Disease/illness education: afib and risk of stroke and heart attack. Med dosages elquis 5 mg. Thyroid and follow up lab sequence and why.    Home health/community services discussion/referrals: Patient does not have home health established from hospital visit.  They do not need home health.  If needed, we will set up home health for the patient.   Establishment or re-establishment of referral orders for community resources:  CM .   Discussion with other health care providers: No discussion with other health care providers necessary.     This 74 y.o. presents today for complaint of follow-up acute on chronic heart failure exacerbating chronic lung disease sarcoidosis.  With rescue patient no longer needed oxygen.  Reviewed record advised follow-up on CHF, sarcoidosis, leukopenia, anemia, thyroid disease.  Review of systems   No chest pain or shortness a breath   No dyspnea on exertion   Weight is stable     Admission Date: 5/7/2024  Hospital Length of Stay: 1 days  Discharge Date and Time:  05/09/2024 9:51 AM  Attending Physician: Rich Leggett MD   Discharging Provider: Rich Leggett MD  Primary Care Provider: Martín Tello NP     Primary Care Team: Networked reference to record PCT      HPI:   Pt is a 74 year old male with a PMH of COPD, CHF, sarcoidosis, Afib on eliquis, and AICD. Pt present to the ED today with complaints of SOB and cough worsening over the last two days. Pt states he has taken his medications regularly as prescribed but did not take meds today. Pt states he has been out of eliquis for approximately 2 days. BLE ultrasound completed without signs of acute DVT. Pt states he follows with cardiology but missed his last appointment. Pt positive for orthopnea and started on 2L O2 per nasal cannula due to hypoxia in 80s. Pt denies being on regular O2 at home. Pt does report cough with minimal sputum production. Pt negative for covid and flu. Pt is a poor historian. Last echo in 2022 with EF of 20%. Pt denies abdominal pain, nausea, vomiting, fever, chills, chest pain, or LE edema. With presentation and labs, will admit to  "hospital medicine for acute heart failure exacerbation. Pt is full code.         * No surgery found *       Hospital Course:   Pt appears to be at baseline, he says he feels "close" after diuresis.  Ran out of his medications 2 days ago staying in [place] w/ a friend.  Also states since [facility] so he's had trouble keeping appts. [facility] and encouraged to keep in touch w/ that physician re: these needs.  CM looking into assistance, but pt states he's already got a solution for transport in that his niece is also a vet and will start bringing him, they will schedule their appts together if possible.  Home tomorrow probably?  TTE pending.       5/9/24  Pt looking good, ambulating about room w/o O2 and w/o cp/sob.  Feels at baseline.  He is out of some of his medications, not sure which, CM looking into it so we can refill what he needs here if possible.  Short term f/u w/ PCP then will [facility] in addition to PCP visits w/ his niece providing transport.     Pulmonary  Acute hypoxemic respiratory failure  In setting of acute on chronic systolic CHF exacerbation d/t running out of medications  Underlying pulmonary sarcoidosis  Lungs clear     Cardiac/Vascular  * Acute on chronic heart failure  Ran out of medications x 2 days PTA  BNP elevated at 1139  CXR showed chronic bilateral mixed interstitial and airspace lung opacities demonstrated are unchanged from previous exam  Patient with cardiac wheezes on admission, clear now  Cardiac diet with fluid restriction   Strict I/Os and daily weights  Updated echo appears unchanged from 2022    Left Ventricle: The left ventricle is mildly dilated. Mildly increased wall thickness. Severe global hypokinesis present. There is severely reduced systolic function with a visually estimated ejection fraction of 15 - 20%. There is diastolic dysfunction but grade cannot be determined.    Right Ventricle: Normal right " ventricular cavity size. Systolic function is normal.    Left Atrium: Left atrium is moderately dilated.    Right Atrium: Right atrium is mildly dilated.    Aortic Valve: . There is severe aortic regurgitation.    Mitral Valve:  There is mild regurgitation.    Pulmonic Valve: There is moderate regurgitation.    IVC/SVC: Intermediate venous pressure at 8 mmHg.  Monitor electrolytes and replete PRN  Tele  Mildly elevated trop I w/ uptrend, low suspicion for ACS, likely demand ischemia  [facility]     ICD (implantable cardioverter-defibrillator), dual, in situ  Pacemaker interrogation planned  No issues on tele  OPT f/u w/ cards        Hypertension  CM reviewing pt home meds w/ him  D/c on home meds for HTN     Paroxysmal atrial fibrillation  Paced atrial rhythm on EKG  Increased Eliquis to 5 BID as he does not meet any criteria for 2.5 mg dosing  OPT PCP and cards f/u     Oncology  Anemia  Very mild  OPT f/u     Leukopenia  OPT f/u  No evidence of sepsis  Don't suspect primary failure of hematopoiesis     Endocrine  Thyroid disease  On amiodarone  H/o thyroid surgery  Cont levothyroxine  TSH 5.7 w/ elevated fT4  Weight stable, no s/s of either hypo or hyperthyroidism right now  OPT f/u        Other  Sarcoidosis  Imaging noted  Needs ongoing OPT f/u w/ PCP, pulm  Pt was requiring O2 when in CHF but now off O2 and having no problems at all breathing and ambulating     Martín Tello NP Follow up in 3 day(s).    Specialty: Family Medicine  Contact information:  [address] [place] [address]  [place] [id number]    Past Medical History:   Diagnosis Date    A-fib     AICD (automatic cardioverter/defibrillator) present     Arthritis     Atrial fibrillation     Cancer     Cardiomyopathy     COPD (chronic obstructive pulmonary disease)     Heart failure     Histoplasmosis     Hypertension     Sarcoidosis     Sleep apnea     Thyroid disease        Past Surgical History:   Procedure Laterality Date    CARDIAC  CATHETERIZATION      CARDIAC DEFIBRILLATOR PLACEMENT Left 2020    CORONARY ANGIOGRAPHY Left 2019    Procedure: CATHETERIZATION, HEART, LEFT (RIGHT RADIAL);  Surgeon: Gerald Romero MD;  Location: [facility] CATH[facility]EP LAB;  Service: Cardiology;  Laterality: Left;    CORONARY STENT PLACEMENT      INSERTION OF BIVENTRICULAR IMPLANTABLE CARDIOVERTER-DEFIBRILLATOR (ICD)      TOTAL THYROIDECTOMY         Family History   Problem Relation Name Age of Onset    Hypertension Father         Social History     Socioeconomic History    Marital status: Legally    Tobacco Use    Smoking status: Former     Current packs/day: 0.00     Types: Cigarettes     Quit date: 2014     Years since quittin.6    Smokeless tobacco: Former   Substance and Sexual Activity    Alcohol use: Not Currently    Drug use: Never   Social History Narrative    ** Merged History Encounter **          Social Determinants of Health     Financial Resource Strain: Low Risk  (2022)    Overall Financial Resource Strain (CARDIA)     Difficulty of Paying Living Expenses: Not very hard   Food Insecurity: No Food Insecurity (2022)    Hunger Vital Sign     Worried About Running Out of Food in the Last Year: Never true     Ran Out of Food in the Last Year: Never true   Transportation Needs: No Transportation Needs (2022)    PRAPARE - Transportation     Lack of Transportation (Medical): No     Lack of Transportation (Non-Medical): No   Physical Activity: Inactive (2022)    Exercise Vital Sign     Days of Exercise per Week: 0 days     Minutes of Exercise per Session: 0 min   Stress: No Stress Concern Present (2022)    [employer] - Occupational Stress Questionnaire     Feeling of Stress : Only a little   Housing Stability: Low Risk  (2022)    Housing Stability Vital Sign     Unable to Pay for Housing in the Last Year: No     Number of Places Lived in the Last Year: 1     Unstable Housing  in the Last Year: No       Current Outpatient Medications   Medication Sig Dispense Refill    acetaminophen (TYLENOL) 500 MG tablet Take 500 mg by mouth every 6 (six) hours as needed for Pain.      albuterol (PROVENTIL/VENTOLIN HFA) 90 mcg/actuation inhaler Inhale 2 puffs into the lungs once daily.      amiodarone (PACERONE) 200 MG Tab Take 1 tablet (200 mg total) by mouth once daily. 30 tablet 0    ascorbic acid, vitamin C, (VITAMIN C) 500 MG tablet Take 500 mg by mouth once daily.      atorvastatin (LIPITOR) 80 MG tablet Take 1 tablet (80 mg total) by mouth every evening. 30 tablet 0    clopidogreL (PLAVIX) 75 mg tablet Take 1 tablet (75 mg total) by mouth once daily. 30 tablet 0    docusate sodium (COLACE) 100 MG capsule Take 1 capsule (100 mg total) by mouth daily as needed for Constipation. 30 capsule 0    ferrous sulfate (FEOSOL) 325 mg (65 mg iron) Tab tablet Take 1 tablet (325 mg total) by mouth once daily. 30 tablet 0    finasteride (PROSCAR) 5 mg tablet Take 1 tablet (5 mg total) by mouth once daily. 30 tablet 0    folic acid (FOLVITE) 1 MG tablet Take 1 tablet (1 mg total) by mouth once daily. 30 tablet 0    furosemide (LASIX) 20 MG tablet Take 1 tablet (20 mg total) by mouth once daily. 30 tablet 0    gabapentin (NEURONTIN) 600 MG tablet Take 1 tablet (600 mg total) by mouth 3 (three) times daily. 90 tablet 0    leflunomide (ARAVA) 20 MG Tab Take 1 tablet (20 mg total) by mouth once daily. 30 tablet 0    levothyroxine (SYNTHROID) 112 MCG tablet Take 1 tablet (112 mcg total) by mouth before breakfast. Take on empty stomach an hour before another other medications. 90 tablet 3    loratadine (CLARITIN) 10 mg tablet Take 1 tablet (10 mg total) by mouth once daily. 30 tablet 0    losartan (COZAAR) 25 MG tablet Take 1 tablet (25 mg total) by mouth once daily. 30 tablet 0    methotrexate 2.5 MG Tab Take 2.5 mg by mouth every 7 days.      metoprolol succinate (TOPROL-XL) 25 MG 24 hr tablet Take 1 tablet (25 mg  "total) by mouth once daily. 30 tablet 0    nitroGLYCERIN (NITROSTAT) 0.4 MG SL tablet Place 0.4 mg under the tongue every 5 (five) minutes as needed for Chest pain.      pantoprazole (PROTONIX) 40 MG tablet Take 40 mg by mouth once daily.      potassium chloride 10% (KAYCIEL) 20 mEq/15 mL oral solution Take 15 mLs (20 mEq total) by mouth once daily. 300 mL 0    sildenafiL (VIAGRA) 50 MG tablet Take 25 mg by mouth daily as needed.      spironolactone (ALDACTONE) 25 MG tablet Take 1 tablet (25 mg total) by mouth once daily. 30 tablet 0    apixaban (ELIQUIS) 5 mg Tab Take 1 tablet (5 mg total) by mouth 2 (two) times daily. (Dose increased from 2.5 to 5 mg daily) To prevent blood clots/stroke 60 tablet 0    prednisoLONE acetate (PRED FORTE) 1 % DrpS 1 drop 4 (four) times daily as needed.       No current facility-administered medications for this visit.       Review of patient's allergies indicates:  No Known Allergies    Physical examination  Vitals Reviewed  /60 (BP Location: Right arm, Patient Position: Sitting, BP Method: Medium (Manual))   Pulse 80   Temp 98.1 °F (36.7 °C) (Oral)   Ht 5' 8" (1.727 m)   Wt 64.4 kg (141 lb 13.9 oz)   SpO2 (!) 93%   BMI 21.57 kg/m²  Body mass index is 21.57 kg/m².     BP Readings from Last 3 Encounters:   05/20/24 112/60   05/09/24 115/78   05/08/24 113/74       Wt Readings from Last 3 Encounters:   05/20/24 64.4 kg (141 lb 13.9 oz)   05/07/24 63.5 kg (140 lb 0.1 oz)   03/28/23 67.1 kg (148 lb)       Gen. Well-dressed well-nourished   Skin warm dry and intact.  No rashes noted.  Chest.  Respirations are even unlabored.  Lungs are clear to auscultation.  Cardiac regular rate and rhythm.  No chest wall adenopathy noted.  Neuro. Awake alert oriented x4.  Normal judgment and cognition noted.  Extremities no clubbing cyanosis or edema noted.     Call or return to clinic prn if these symptoms worsen or fail to improve as anticipated.      "

## 2024-05-20 NOTE — PATIENT INSTRUCTIONS
[facility]  [facility]  Leukopenia stable. [facility] for CBC.   Anemia scopes UTD. Dr Flowers in [place].   Thyroid disease. Amiodarone and OOM. Weight stable. [facility] up [facility] for follow [facility] on Thyroid 2-3 months. Recheck labs. (TSH, free T4)  Be sure your Apixaban dose is 5mg.

## 2024-05-21 LAB — T3FREE SERPL-MCNC: 1.8 PG/ML (ref 2–4.4)

## 2024-05-23 NOTE — PLAN OF CARE
Through communication with [facility], the Inpatient order is being changed to observation as the payer will not authorize Inpatient and the facility agrees not to appeal or challenge the change in status. The account will be changed to Observation for billing purposes.      Za Merchant MD  Physician Advisor

## 2024-05-29 LAB
OHS QRS DURATION: 108 MS
OHS QTC CALCULATION: 495 MS

## 2024-06-03 ENCOUNTER — PATIENT OUTREACH (OUTPATIENT)
Dept: ADMINISTRATIVE | Facility: OTHER | Age: 74
End: 2024-06-03
Payer: MEDICARE

## 2024-06-06 ENCOUNTER — OFFICE VISIT (OUTPATIENT)
Dept: PRIMARY CARE CLINIC | Facility: CLINIC | Age: 74
End: 2024-06-06
Payer: MEDICARE

## 2024-06-06 VITALS
BODY MASS INDEX: 21.35 KG/M2 | HEIGHT: 68 IN | HEART RATE: 80 BPM | WEIGHT: 140.88 LBS | TEMPERATURE: 98 F | OXYGEN SATURATION: 96 % | DIASTOLIC BLOOD PRESSURE: 60 MMHG | SYSTOLIC BLOOD PRESSURE: 104 MMHG

## 2024-06-06 DIAGNOSIS — D72.819 LEUKOPENIA, UNSPECIFIED TYPE: ICD-10-CM

## 2024-06-06 DIAGNOSIS — R09.81 SINUS CONGESTION: ICD-10-CM

## 2024-06-06 DIAGNOSIS — I50.23 ACUTE ON CHRONIC SYSTOLIC HEART FAILURE: Primary | ICD-10-CM

## 2024-06-06 DIAGNOSIS — I77.811 ABDOMINAL AORTIC ECTASIA: ICD-10-CM

## 2024-06-06 DIAGNOSIS — I35.1 AORTIC VALVE INSUFFICIENCY, ETIOLOGY OF CARDIAC VALVE DISEASE UNSPECIFIED: ICD-10-CM

## 2024-06-06 DIAGNOSIS — M06.9 RHEUMATOID ARTHRITIS, INVOLVING UNSPECIFIED SITE, UNSPECIFIED WHETHER RHEUMATOID FACTOR PRESENT: ICD-10-CM

## 2024-06-06 DIAGNOSIS — E07.9 THYROID DISEASE: ICD-10-CM

## 2024-06-06 DIAGNOSIS — D64.9 ANEMIA, UNSPECIFIED TYPE: ICD-10-CM

## 2024-06-06 DIAGNOSIS — I48.0 PAROXYSMAL ATRIAL FIBRILLATION: ICD-10-CM

## 2024-06-06 DIAGNOSIS — D86.9 SARCOIDOSIS: ICD-10-CM

## 2024-06-06 LAB
OHS QRS DURATION: 108 MS
OHS QTC CALCULATION: 446 MS

## 2024-06-06 PROCEDURE — 99999 PR PBB SHADOW E&M-EST. PATIENT-LVL III: CPT | Mod: PBBFAC,,, | Performed by: NURSE PRACTITIONER

## 2024-06-06 PROCEDURE — 3288F FALL RISK ASSESSMENT DOCD: CPT | Mod: CPTII,S$GLB,, | Performed by: NURSE PRACTITIONER

## 2024-06-06 PROCEDURE — 3074F SYST BP LT 130 MM HG: CPT | Mod: CPTII,S$GLB,, | Performed by: NURSE PRACTITIONER

## 2024-06-06 PROCEDURE — 1101F PT FALLS ASSESS-DOCD LE1/YR: CPT | Mod: CPTII,S$GLB,, | Performed by: NURSE PRACTITIONER

## 2024-06-06 PROCEDURE — 4010F ACE/ARB THERAPY RXD/TAKEN: CPT | Mod: CPTII,S$GLB,, | Performed by: NURSE PRACTITIONER

## 2024-06-06 PROCEDURE — 99214 OFFICE O/P EST MOD 30 MIN: CPT | Mod: S$GLB,,, | Performed by: NURSE PRACTITIONER

## 2024-06-06 PROCEDURE — 1126F AMNT PAIN NOTED NONE PRSNT: CPT | Mod: CPTII,S$GLB,, | Performed by: NURSE PRACTITIONER

## 2024-06-06 PROCEDURE — 1111F DSCHRG MED/CURRENT MED MERGE: CPT | Mod: CPTII,S$GLB,, | Performed by: NURSE PRACTITIONER

## 2024-06-06 PROCEDURE — 3078F DIAST BP <80 MM HG: CPT | Mod: CPTII,S$GLB,, | Performed by: NURSE PRACTITIONER

## 2024-06-06 RX ORDER — FLUTICASONE PROPIONATE 50 MCG
2 SPRAY, SUSPENSION (ML) NASAL DAILY
Qty: 16 G | Refills: 1 | Status: SHIPPED | OUTPATIENT
Start: 2024-06-06 | End: 2024-07-06

## 2024-06-06 NOTE — PATIENT INSTRUCTIONS
CHF follow up cardiology. [facility] the [facility]  Sarcoidosis follow [facility] at the [facility]  Thyroid disease. Amiodarone and you were out of medication. Monitor your Weight. [facility] up [facility] for follow up on Thyroid 2-3 months. Recheck labs.   [facility]. Be sure to arrange transportation.   Rheumatoid arthritis stable and controlled. Continue therapy Arava  Abd Aortic ectasia incidental finding on imaging. Continue lipitor and plavix.

## 2024-06-06 NOTE — PROGRESS NOTES
This dictation has been generated using Modal Fluency Dictation some phonetic errors may occur. Please contact author for clarification if needed.     Problem List Items Addressed This Visit       Acute on chronic heart failure - Primary    Relevant Orders    Ambulatory referral/consult to Cardiology    Sarcoidosis    Leukopenia    Anemia    Paroxysmal atrial fibrillation    Relevant Orders    Ambulatory referral/consult to Cardiology    Thyroid disease    Rheumatoid arthritis, involving unspecified site, unspecified whether rheumatoid factor present    Abdominal aortic ectasia     Other Visit Diagnoses       Sinus congestion        Aortic valve insufficiency, etiology of cardiac valve disease unspecified        Relevant Orders    Ambulatory referral/consult to Cardiology              Orders Placed This Encounter    Ambulatory referral/consult to Cardiology    fluticasone propionate (FLONASE) 50 mcg/actuation nasal spray       [facility] [place]. Aortic regurg needs follow up.   Sarcoidosis [facility]  Leukopenia stable  Anemia scopes UTD.   Thyroid disease. Amiodarone and OOM. Weight stable. Follow up [facility] for follow [facility] on Thyroid 2-3 months. Recheck labs.   [facility]. May need transportation.   Rheumatoid arthritis stable and controlled. Continue therapy Arava  Abd Aortic ectasia incidental finding on imaging. Continue lipitor and plavix.   Sinus congestion. Flonase     Follow up if symptoms worsen or fail to improve.    ________________________________________________________________  ________________________________________________________________      Chief Complaint   Patient presents with    Follow-up     History of present illness    This 74 y.o. presents today for complaint of 2 week follow up. Lab review tsh mild elevation due to missed meds. Missed apt with cards needs follow up.   C/o sinus congestion without fever or chills.  "Sinus headache present.   No CP or sob. Not needing oxygen currently.     LOV Follow-up acute on chronic heart failure exacerbating chronic lung disease sarcoidosis.  With rescue patient no longer needed oxygen.  Reviewed record advised follow-up on CHF, sarcoidosis, leukopenia, anemia, thyroid disease.  Review of systems   No chest pain or shortness a breath   No dyspnea on exertion   Weight is stable     Admission Date: 5/7/2024  Hospital Length of Stay: 1 days  Discharge Date and Time:  05/09/2024 9:51 AM  Attending Physician: Rich Leggett MD   Discharging Provider: Rich Leggett MD  Primary Care Provider: Martín Tello NP     Primary Care Team: Networked reference to record PCT      HPI:   Pt is a 74 year old male with a PMH of COPD, CHF, sarcoidosis, Afib on eliquis, and AICD. Pt present to the ED today with complaints of SOB and cough worsening over the last two days. Pt states he has taken his medications regularly as prescribed but did not take meds today. Pt states he has been out of eliquis for approximately 2 days. BLE ultrasound completed without signs of acute DVT. Pt states he follows with cardiology but missed his last appointment. Pt positive for orthopnea and started on 2L O2 per nasal cannula due to hypoxia in 80s. Pt denies being on regular O2 at home. Pt does report cough with minimal sputum production. Pt negative for covid and flu. Pt is a poor historian. Last echo in 2022 with EF of 20%. Pt denies abdominal pain, nausea, vomiting, fever, chills, chest pain, or LE edema. With presentation and labs, will admit to hospital medicine for acute heart failure exacerbation. Pt is full code.         * No surgery found *       Hospital Course:   Pt appears to be at baseline, he says he feels "close" after diuresis.  Ran out of his medications 2 days ago staying in [place] w/ a friend.  Also states since [facility] so he's had trouble keeping appts. [facility] of [facility] and encouraged to keep in touch w/ that physician re: these needs.  CM looking into assistance, but pt states he's already got a solution for transport in that his niece is also a vet and will start bringing him, they will schedule their appts together if possible.  Home tomorrow probably?  TTE pending.       5/9/24  Pt looking good, ambulating about room w/o O2 and w/o cp/sob.  Feels at baseline.  He is out of some of his medications, not sure which, CM looking into it so we can refill what he needs here if possible.  Short term f/u w/ PCP then will [facility] in addition to PCP visits w/ his niece providing transport.     Pulmonary  Acute hypoxemic respiratory failure  In setting of acute on chronic systolic CHF exacerbation d/t running out of medications  Underlying pulmonary sarcoidosis  Lungs clear     Cardiac/Vascular  * Acute on chronic heart failure  Ran out of medications x 2 days PTA  BNP elevated at 1139  CXR showed chronic bilateral mixed interstitial and airspace lung opacities demonstrated are unchanged from previous exam  Patient with cardiac wheezes on admission, clear now  Cardiac diet with fluid restriction   Strict I/Os and daily weights  Updated echo appears unchanged from 2022    Left Ventricle: The left ventricle is mildly dilated. Mildly increased wall thickness. Severe global hypokinesis present. There is severely reduced systolic function with a visually estimated ejection fraction of 15 - 20%. There is diastolic dysfunction but grade cannot be determined.    Right Ventricle: Normal right ventricular cavity size. Systolic function is normal.    Left Atrium: Left atrium is moderately dilated.    Right Atrium: Right atrium is mildly dilated.    Aortic Valve: . There is severe aortic regurgitation.    Mitral Valve:  There is mild regurgitation.    Pulmonic Valve: There is moderate regurgitation.    IVC/SVC: Intermediate venous pressure at 8 mmHg.  Monitor  electrolytes and replete PRN  Tele  Mildly elevated trop I w/ uptrend, low suspicion for ACS, likely demand ischemia  [facility]     ICD (implantable cardioverter-defibrillator), dual, in situ  Pacemaker interrogation planned  No issues on tele  OPT f/u w/ cards        Hypertension  CM reviewing pt home meds w/ him  D/c on home meds for HTN     Paroxysmal atrial fibrillation  Paced atrial rhythm on EKG  Increased Eliquis to 5 BID as he does not meet any criteria for 2.5 mg dosing  OPT PCP and cards f/u     Oncology  Anemia  Very mild  OPT f/u     Leukopenia  OPT f/u  No evidence of sepsis  Don't suspect primary failure of hematopoiesis     Endocrine  Thyroid disease  On amiodarone  H/o thyroid surgery  Cont levothyroxine  TSH 5.7 w/ elevated fT4  Weight stable, no s/s of either hypo or hyperthyroidism right now  OPT f/u        Other  Sarcoidosis  Imaging noted  Needs ongoing OPT f/u w/ PCP, pulm  Pt was requiring O2 when in CHF but now off O2 and having no problems at all breathing and ambulating     Martín Tello, NP Follow up in 3 day(s).    Specialty: Family Medicine  Contact information:  [address] [place] [address]  [place] [id number]    Past Medical History:   Diagnosis Date    A-fib     AICD (automatic cardioverter/defibrillator) present     Arthritis     Atrial fibrillation     Cancer     Cardiomyopathy     COPD (chronic obstructive pulmonary disease)     Heart failure     Histoplasmosis     Hypertension     Sarcoidosis     Sleep apnea     Thyroid disease        Past Surgical History:   Procedure Laterality Date    CARDIAC CATHETERIZATION      CARDIAC DEFIBRILLATOR PLACEMENT Left 01/2020    CORONARY ANGIOGRAPHY Left 9/16/2019    Procedure: CATHETERIZATION, HEART, LEFT (RIGHT RADIAL);  Surgeon: Gerald Romero MD;  Location: [facility] C[facility] LAB;  Service: Cardiology;  Laterality: Left;    CORONARY STENT PLACEMENT      INSERTION OF BIVENTRICULAR IMPLANTABLE CARDIOVERTER-DEFIBRILLATOR  (ICD)      TOTAL THYROIDECTOMY         Family History   Problem Relation Name Age of Onset    Hypertension Father         Social History     Socioeconomic History    Marital status: Legally    Tobacco Use    Smoking status: Former     Current packs/day: 0.00     Types: Cigarettes     Quit date: 2014     Years since quittin.7    Smokeless tobacco: Former   Substance and Sexual Activity    Alcohol use: Not Currently    Drug use: Never   Social History Narrative    ** Merged History Encounter **          Social Determinants of Health     Financial Resource Strain: Low Risk  (2022)    Overall Financial Resource Strain (CARDIA)     Difficulty of Paying Living Expenses: Not very hard   Food Insecurity: No Food Insecurity (2022)    Hunger Vital Sign     Worried About Running Out of Food in the Last Year: Never true     Ran Out of Food in the Last Year: Never true   Transportation Needs: No Transportation Needs (2022)    PRAPARE - Transportation     Lack of Transportation (Medical): No     Lack of Transportation (Non-Medical): No   Physical Activity: Inactive (2022)    Exercise Vital Sign     Days of Exercise per Week: 0 days     Minutes of Exercise per Session: 0 min   Stress: No Stress Concern Present (2022)    Citizen of [place] of Occupational Health - Occupational Stress Questionnaire     Feeling of Stress : Only a little   Housing Stability: Low Risk  (2022)    Housing Stability Vital Sign     Unable to Pay for Housing in the Last Year: No     Number of Places Lived in the Last Year: 1     Unstable Housing in the Last Year: No       Current Outpatient Medications   Medication Sig Dispense Refill    acetaminophen (TYLENOL) 500 MG tablet Take 500 mg by mouth every 6 (six) hours as needed for Pain.      albuterol (PROVENTIL/VENTOLIN HFA) 90 mcg/actuation inhaler Inhale 2 puffs into the lungs once daily.      amiodarone (PACERONE) 200 MG Tab Take 1 tablet (200 mg total) by  mouth once daily. 30 tablet 0    apixaban (ELIQUIS) 5 mg Tab Take 1 tablet (5 mg total) by mouth 2 (two) times daily. (Dose increased from 2.5 to 5 mg daily) To prevent blood clots/stroke 60 tablet 0    ascorbic acid, vitamin C, (VITAMIN C) 500 MG tablet Take 500 mg by mouth once daily.      atorvastatin (LIPITOR) 80 MG tablet Take 1 tablet (80 mg total) by mouth every evening. 30 tablet 0    clopidogreL (PLAVIX) 75 mg tablet Take 1 tablet (75 mg total) by mouth once daily. 30 tablet 0    docusate sodium (COLACE) 100 MG capsule Take 1 capsule (100 mg total) by mouth daily as needed for Constipation. 30 capsule 0    ferrous sulfate (FEOSOL) 325 mg (65 mg iron) Tab tablet Take 1 tablet (325 mg total) by mouth once daily. 30 tablet 0    finasteride (PROSCAR) 5 mg tablet Take 1 tablet (5 mg total) by mouth once daily. 30 tablet 0    folic acid (FOLVITE) 1 MG tablet Take 1 tablet (1 mg total) by mouth once daily. 30 tablet 0    furosemide (LASIX) 20 MG tablet Take 1 tablet (20 mg total) by mouth once daily. 30 tablet 0    gabapentin (NEURONTIN) 600 MG tablet Take 1 tablet (600 mg total) by mouth 3 (three) times daily. 90 tablet 0    leflunomide (ARAVA) 20 MG Tab Take 1 tablet (20 mg total) by mouth once daily. 30 tablet 0    levothyroxine (SYNTHROID) 112 MCG tablet Take 1 tablet (112 mcg total) by mouth before breakfast. Take on empty stomach an hour before another other medications. 90 tablet 3    loratadine (CLARITIN) 10 mg tablet Take 1 tablet (10 mg total) by mouth once daily. 30 tablet 0    losartan (COZAAR) 25 MG tablet Take 1 tablet (25 mg total) by mouth once daily. 30 tablet 0    methotrexate 2.5 MG Tab Take 2.5 mg by mouth every 7 days.      metoprolol succinate (TOPROL-XL) 25 MG 24 hr tablet Take 1 tablet (25 mg total) by mouth once daily. 30 tablet 0    nitroGLYCERIN (NITROSTAT) 0.4 MG SL tablet Place 0.4 mg under the tongue every 5 (five) minutes as needed for Chest pain.      pantoprazole (PROTONIX) 40 MG  "tablet Take 40 mg by mouth once daily.      potassium chloride 10% (KAYCIEL) 20 mEq/15 mL oral solution Take 15 mLs (20 mEq total) by mouth once daily. 300 mL 0    prednisoLONE acetate (PRED FORTE) 1 % DrpS 1 drop 4 (four) times daily as needed.      sildenafiL (VIAGRA) 50 MG tablet Take 25 mg by mouth daily as needed.      spironolactone (ALDACTONE) 25 MG tablet Take 1 tablet (25 mg total) by mouth once daily. 30 tablet 0    fluticasone propionate (FLONASE) 50 mcg/actuation nasal spray 2 sprays (100 mcg total) by Each Nostril route once daily. 16 g 1     No current facility-administered medications for this visit.       Review of patient's allergies indicates:  No Known Allergies    Physical examination  Vitals Reviewed  /60 (BP Location: Right arm, Patient Position: Sitting, BP Method: Medium (Manual))   Pulse 80   Temp 97.8 °F (36.6 °C) (Oral)   Ht 5' 8" (1.727 m)   Wt 63.9 kg (140 lb 14 oz)   SpO2 96%   BMI 21.42 kg/m²  Body mass index is 21.42 kg/m².     BP Readings from Last 3 Encounters:   06/06/24 104/60   05/20/24 112/60   05/09/24 115/78       Wt Readings from Last 3 Encounters:   06/06/24 63.9 kg (140 lb 14 oz)   05/20/24 64.4 kg (141 lb 13.9 oz)   05/07/24 63.5 kg (140 lb 0.1 oz)       Gen. Well-dressed well-nourished   Skin warm dry and intact.  No rashes noted.  Chest.  Respirations are even unlabored.  Lungs are clear to auscultation.  Cardiac regular rate and rhythm.  No chest wall adenopathy noted.  Neuro. Awake alert oriented x4.  Normal judgment and cognition noted.  Extremities no clubbing cyanosis or edema noted.     Call or return to clinic prn if these symptoms worsen or fail to improve as anticipated.        "

## 2024-06-07 NOTE — PROGRESS NOTES
CHW - Initial Contact    Dina Stewart Community Health Worker completed the Social Determinant of Health questionnaire with patient via telephone today.    Pt identified barriers of most importance are: transportation    Referrals to community agencies completed with patient/caregiver consent outside of [facility] include: None   Referrals were put through [facility] - no:   Other information discussed the patient needs / wants help with: None at this time.     Follow-up Outreach - Due: 6/10/2024

## 2024-06-10 ENCOUNTER — TELEPHONE (OUTPATIENT)
Dept: FAMILY MEDICINE | Facility: CLINIC | Age: 74
End: 2024-06-10
Payer: MEDICARE

## 2024-06-10 NOTE — TELEPHONE ENCOUNTER
Vicki Nurse advisor with [facility] called looking for Red Sanchez but called Jaun Sanchez's office. She was given the correct number to call.

## 2024-06-11 ENCOUNTER — PATIENT OUTREACH (OUTPATIENT)
Dept: ADMINISTRATIVE | Facility: OTHER | Age: 74
End: 2024-06-11
Payer: MEDICARE

## 2024-06-17 NOTE — PROGRESS NOTES
CHW - Follow Up    Dina Stewart, Community Health Worker completed a follow up visit with patient via telephone today.  Pt/Caregiver reported: Mr. Madrid reported that he has not contacted his health insurance companies to verify transportation.  Pt explained that he does have a ride to the clinic on tomorrow.   Community Health Worker provided: CHW asked Pt to call his insurance companies to verify transportation and she will call him on next week.   Follow-up Outreach - Due: 6/24/2024

## 2024-06-18 ENCOUNTER — PATIENT OUTREACH (OUTPATIENT)
Dept: ADMINISTRATIVE | Facility: OTHER | Age: 74
End: 2024-06-18
Payer: MEDICARE

## 2024-06-18 ENCOUNTER — HOSPITAL ENCOUNTER (OUTPATIENT)
Dept: CARDIOLOGY | Facility: CLINIC | Age: 74
Discharge: HOME | End: 2024-06-18
Attending: INTERNAL MEDICINE
Payer: MEDICARE

## 2024-06-18 DIAGNOSIS — Z95.810 ICD (IMPLANTABLE CARDIOVERTER-DEFIBRILLATOR), DUAL, IN SITU: ICD-10-CM

## 2024-06-18 LAB
BATTERY VOLTAGE (V): 2.97 V
CHARGE TIME (SEC): 4 SEC
HV IMPEDANCE (OHM): 60 OHM
IMPEDANCE RA LEAD (NATIVE): 342 OHMS
IMPEDANCE RA LEAD: 399 OHMS
P/R-WAVE RA LEAD (NATIVE): 17.9 MV
P/R-WAVE RA LEAD: 2.4 MV
THRESHOLD MS RA LEAD (NATIVE): 0.4 MS
THRESHOLD MS RA LEAD: 0.4 MS
THRESHOLD V RA LEAD (NATIVE): 2 V
THRESHOLD V RA LEAD: 0.6 V

## 2024-06-18 PROCEDURE — 93289 INTERROG DEVICE EVAL HEART: CPT | Mod: S$GLB,,, | Performed by: INTERNAL MEDICINE

## 2024-06-18 NOTE — PROGRESS NOTES
CHW - Follow Up    Dina Stewart, Community Health Worker completed a follow up visit with patient via telephone today.  Pt/Caregiver reported: Mr. Madrid contacted CHW to verify his appointment today.    Community Health Worker provided: CHW confirmed his appointment today for 11:00 a.m. at [facility].     Follow-up Outreach - Due: 6/27/2024

## 2024-06-26 ENCOUNTER — OUTPATIENT CASE MANAGEMENT (OUTPATIENT)
Dept: ADMINISTRATIVE | Facility: OTHER | Age: 74
End: 2024-06-26
Payer: MEDICARE

## 2024-06-26 ENCOUNTER — PATIENT OUTREACH (OUTPATIENT)
Dept: ADMINISTRATIVE | Facility: OTHER | Age: 74
End: 2024-06-26
Payer: MEDICARE

## 2024-06-26 NOTE — PROGRESS NOTES
CHW - Follow Up    Dina Stewart  Community Health Worker completed a follow up visit with patient via telephone today. CHW received referral for Mr. Madrid from our Patient Engagement Office for Transportation.   Pt/Caregiver reported: Mr. Madrid checked with his 2 insurance companies and he has a transportation benefit through his health insurance.      CHW - Case Closure    Mr. Madrid denied any additional needs at this time and agrees with episode closure at this time.  CHW provided patient with Community Health Worker's contact information and encouraged him to contact this Community Health Worker if additional needs arise.       Case Closure - Due: 6/26/2024

## 2024-06-26 NOTE — PROGRESS NOTES
Per chart review Dina Stewart actively following patient. SW will close case as needs are being  met by others.

## 2024-08-06 ENCOUNTER — OFFICE VISIT (OUTPATIENT)
Dept: CARDIOLOGY | Facility: CLINIC | Age: 74
End: 2024-08-06
Payer: MEDICARE

## 2024-08-06 VITALS
DIASTOLIC BLOOD PRESSURE: 72 MMHG | SYSTOLIC BLOOD PRESSURE: 114 MMHG | OXYGEN SATURATION: 96 % | WEIGHT: 143 LBS | HEIGHT: 68 IN | HEART RATE: 82 BPM | RESPIRATION RATE: 16 BRPM | BODY MASS INDEX: 21.67 KG/M2

## 2024-08-06 DIAGNOSIS — I48.0 PAROXYSMAL ATRIAL FIBRILLATION: ICD-10-CM

## 2024-08-06 DIAGNOSIS — I10 PRIMARY HYPERTENSION: ICD-10-CM

## 2024-08-06 DIAGNOSIS — I42.0 DILATED CARDIOMYOPATHY: ICD-10-CM

## 2024-08-06 DIAGNOSIS — I50.23 ACUTE ON CHRONIC SYSTOLIC HEART FAILURE: ICD-10-CM

## 2024-08-06 DIAGNOSIS — Z95.810 ICD (IMPLANTABLE CARDIOVERTER-DEFIBRILLATOR), DUAL, IN SITU: Primary | ICD-10-CM

## 2024-08-06 DIAGNOSIS — I08.0 MITRAL VALVE INSUFFICIENCY AND AORTIC VALVE INSUFFICIENCY: ICD-10-CM

## 2024-08-06 DIAGNOSIS — I35.1 AORTIC VALVE INSUFFICIENCY, ETIOLOGY OF CARDIAC VALVE DISEASE UNSPECIFIED: ICD-10-CM

## 2024-08-06 PROCEDURE — 99999 PR PBB SHADOW E&M-EST. PATIENT-LVL V: CPT | Mod: PBBFAC,,, | Performed by: INTERNAL MEDICINE

## 2024-08-07 RX ORDER — SODIUM CHLORIDE 9 MG/ML
INJECTION, SOLUTION INTRAVENOUS CONTINUOUS
OUTPATIENT
Start: 2024-08-07

## 2024-08-07 RX ORDER — SACUBITRIL AND VALSARTAN 24; 26 MG/1; MG/1
1 TABLET, FILM COATED ORAL 2 TIMES DAILY
Qty: 180 TABLET | Refills: 3 | Status: SHIPPED | OUTPATIENT
Start: 2024-08-07 | End: 2024-08-08

## 2024-08-08 RX ORDER — SACUBITRIL AND VALSARTAN 24; 26 MG/1; MG/1
1 TABLET, FILM COATED ORAL 2 TIMES DAILY
Qty: 180 TABLET | Refills: 3 | Status: SHIPPED | OUTPATIENT
Start: 2024-08-08

## 2024-08-12 PROBLEM — J96.01 ACUTE HYPOXEMIC RESPIRATORY FAILURE: Status: RESOLVED | Noted: 2020-05-25 | Resolved: 2024-08-12

## 2024-09-25 ENCOUNTER — OFFICE VISIT (OUTPATIENT)
Dept: CARDIOLOGY | Facility: CLINIC | Age: 74
End: 2024-09-25
Payer: MEDICARE

## 2024-09-25 VITALS
WEIGHT: 142 LBS | BODY MASS INDEX: 21.52 KG/M2 | DIASTOLIC BLOOD PRESSURE: 64 MMHG | SYSTOLIC BLOOD PRESSURE: 116 MMHG | HEART RATE: 73 BPM | HEIGHT: 68 IN | RESPIRATION RATE: 16 BRPM | OXYGEN SATURATION: 97 %

## 2024-09-25 DIAGNOSIS — I48.0 PAROXYSMAL ATRIAL FIBRILLATION: ICD-10-CM

## 2024-09-25 DIAGNOSIS — I42.0 DILATED CARDIOMYOPATHY: Primary | ICD-10-CM

## 2024-09-25 DIAGNOSIS — E07.9 THYROID DISEASE: ICD-10-CM

## 2024-09-25 DIAGNOSIS — E78.5 DYSLIPIDEMIA: ICD-10-CM

## 2024-09-25 DIAGNOSIS — I10 PRIMARY HYPERTENSION: ICD-10-CM

## 2024-09-25 DIAGNOSIS — I08.0 MITRAL VALVE INSUFFICIENCY AND AORTIC VALVE INSUFFICIENCY: ICD-10-CM

## 2024-09-25 PROCEDURE — 3078F DIAST BP <80 MM HG: CPT | Mod: CPTII,S$GLB,, | Performed by: INTERNAL MEDICINE

## 2024-09-25 PROCEDURE — 3288F FALL RISK ASSESSMENT DOCD: CPT | Mod: CPTII,S$GLB,, | Performed by: INTERNAL MEDICINE

## 2024-09-25 PROCEDURE — 1126F AMNT PAIN NOTED NONE PRSNT: CPT | Mod: CPTII,S$GLB,, | Performed by: INTERNAL MEDICINE

## 2024-09-25 PROCEDURE — 99213 OFFICE O/P EST LOW 20 MIN: CPT | Mod: S$GLB,,, | Performed by: INTERNAL MEDICINE

## 2024-09-25 PROCEDURE — 99999 PR PBB SHADOW E&M-EST. PATIENT-LVL IV: CPT | Mod: PBBFAC,,, | Performed by: INTERNAL MEDICINE

## 2024-09-25 PROCEDURE — 4010F ACE/ARB THERAPY RXD/TAKEN: CPT | Mod: CPTII,S$GLB,, | Performed by: INTERNAL MEDICINE

## 2024-09-25 PROCEDURE — 3008F BODY MASS INDEX DOCD: CPT | Mod: CPTII,S$GLB,, | Performed by: INTERNAL MEDICINE

## 2024-09-25 PROCEDURE — 1159F MED LIST DOCD IN RCRD: CPT | Mod: CPTII,S$GLB,, | Performed by: INTERNAL MEDICINE

## 2024-09-25 PROCEDURE — 1101F PT FALLS ASSESS-DOCD LE1/YR: CPT | Mod: CPTII,S$GLB,, | Performed by: INTERNAL MEDICINE

## 2024-09-25 PROCEDURE — 1160F RVW MEDS BY RX/DR IN RCRD: CPT | Mod: CPTII,S$GLB,, | Performed by: INTERNAL MEDICINE

## 2024-09-25 PROCEDURE — 3074F SYST BP LT 130 MM HG: CPT | Mod: CPTII,S$GLB,, | Performed by: INTERNAL MEDICINE

## 2024-09-25 NOTE — PROGRESS NOTES
Subjective:    Patient ID:  Trevor Madrid is a 74 y.o. male patient here for evaluation Follow-up      History of Present Illness:     Patient is a 74-year-old gentleman with history of atrial fibrillation ICD in Situ cardiomyopathy and compensated congestive heart failure seeking follow-up evaluation.  He is doing remarkably well denies having any episodes of any angina no symptoms of shortness of breath with normal physical activity and no edema noted in the lower extremities.  Overall he is doing good he was some mild wheezing persisting apparently        Review of patient's allergies indicates:  No Known Allergies    Past Medical History:   Diagnosis Date    A-fib     AICD (automatic cardioverter/defibrillator) present     Arthritis     Atrial fibrillation     Cancer     Cardiomyopathy     COPD (chronic obstructive pulmonary disease)     Heart failure     Histoplasmosis     Hypertension     Sarcoidosis     Sleep apnea     Thyroid disease      Past Surgical History:   Procedure Laterality Date    CARDIAC CATHETERIZATION      CARDIAC DEFIBRILLATOR PLACEMENT Left 01/2020    CORONARY ANGIOGRAPHY Left 9/16/2019    Procedure: CATHETERIZATION, HEART, LEFT (RIGHT RADIAL);  Surgeon: Gerald Romero MD;  Location: [facility] CATH/EP LAB;  Service: Cardiology;  Laterality: Left;    CORONARY STENT PLACEMENT      INSERTION OF BIVENTRICULAR IMPLANTABLE CARDIOVERTER-DEFIBRILLATOR (ICD)      TOTAL THYROIDECTOMY       Social History     Tobacco Use    Smoking status: Former     Current packs/day: 0.00     Types: Cigarettes     Quit date: 9/12/2014     Years since quitting: 10.0    Smokeless tobacco: Former   Substance Use Topics    Alcohol use: Not Currently    Drug use: Never        Review of Systems:    As noted in HPI in addition      REVIEW OF SYSTEMS  CARDIOVASCULAR: No recent chest pain, palpitations, arm, neck, or jaw pain  RESPIRATORY: No recent fever, cough chills, SOB or congestion  : No blood in the urine  GI: No  Nausea, vomiting, constipation, diarrhea, blood, or reflux.  MUSCULOSKELETAL: No myalgias  NEURO: No lightheadedness or dizziness  EYES: No Double vision, blurry, vision or headache              Objective        Vitals:    09/25/24 1327   BP: 116/64   Pulse: 73   Resp: 16       LIPIDS - LAST 2   Lab Results   Component Value Date    CHOL 180 10/20/2022    HDL 44 10/20/2022    LDLCALC 125.2 10/20/2022    TRIG 54 10/20/2022    CHOLHDL 24.4 10/20/2022       CBC - LAST 2  Lab Results   Component Value Date    WBC 3.19 (L) 05/09/2024    WBC 3.37 (L) 05/08/2024    RBC 5.55 05/09/2024    RBC 5.30 05/08/2024    HGB 13.4 (L) 05/09/2024    HGB 12.7 (L) 05/08/2024    HCT 43.3 05/09/2024    HCT 41.2 05/08/2024    MCV 78 (L) 05/09/2024    MCV 78 (L) 05/08/2024    MCH 24.1 (L) 05/09/2024    MCH 24.0 (L) 05/08/2024    MCHC 30.9 (L) 05/09/2024    MCHC 30.8 (L) 05/08/2024    RDW 14.4 05/09/2024    RDW 14.5 05/08/2024     05/09/2024     05/08/2024    MPV 11.3 05/09/2024    MPV 10.9 05/08/2024    GRAN 1.9 05/09/2024    GRAN 60.2 05/09/2024    LYMPH 0.7 (L) 05/09/2024    LYMPH 21.3 05/09/2024    MONO 0.4 05/09/2024    MONO 12.5 05/09/2024    BASO 0.04 05/09/2024    BASO 0.04 05/08/2024    NRBC 0 05/09/2024    NRBC 0 05/08/2024       CHEMISTRY & LIVER FUNCTION - LAST 2  Lab Results   Component Value Date     05/09/2024     05/08/2024    K 4.4 05/09/2024    K 4.1 05/08/2024     05/09/2024     05/08/2024    CO2 30 (H) 05/09/2024    CO2 26 05/08/2024    ANIONGAP 7 (L) 05/09/2024    ANIONGAP 11 05/08/2024    BUN 31 (H) 05/09/2024    BUN 16 05/08/2024    CREATININE 1.2 05/09/2024    CREATININE 0.9 05/08/2024    GLU 91 05/09/2024    GLU 93 05/08/2024    CALCIUM 9.5 05/09/2024    CALCIUM 9.1 05/08/2024    PH 7.422 05/24/2020    MG 2.0 05/09/2024    MG 2.1 05/08/2024    ALBUMIN 3.6 05/07/2024    ALBUMIN 3.6 12/09/2022    PROT 7.2 05/07/2024    PROT 8.1 12/09/2022    ALKPHOS 62 05/07/2024    ALKPHOS 73  12/09/2022    ALT 10 05/07/2024    ALT 11 12/09/2022    AST 18 05/07/2024    AST 15 12/09/2022    BILITOT 0.4 05/07/2024    BILITOT 1.0 12/09/2022        CARDIAC PROFILE - LAST 2  Lab Results   Component Value Date    BNP 1,139 (H) 05/07/2024     (H) 12/08/2022     (H) 05/24/2020     (H) 05/24/2020    TROPONINI 0.060 (HH) 05/25/2020    TROPONINI 0.061 (HH) 05/25/2020    TROPONINIHS 28.1 (H) 05/08/2024    TROPONINIHS 16.9 (H) 05/07/2024        COAGULATION - LAST 2  Lab Results   Component Value Date    LABPT 15.6 (H) 12/08/2022    LABPT 13.7 01/27/2020    INR 1.3 12/08/2022    INR 1.1 01/27/2020    APTT 41.7 (H) 12/08/2022    APTT 36.0 (H) 01/27/2020       ENDOCRINE & PSA - LAST 2  Lab Results   Component Value Date    TSH 5.746 (H) 05/07/2024    TSH 2.670 12/08/2022    PROCAL 0.12 05/24/2020        ECHOCARDIOGRAM RESULTS  Results for orders placed during the hospital encounter of 05/07/24    Echo    Interpretation Summary    Left Ventricle: The left ventricle is mildly dilated. Mildly increased wall thickness. Severe global hypokinesis present. There is severely reduced systolic function with a visually estimated ejection fraction of 15 - 20%. There is diastolic dysfunction but grade cannot be determined.    Right Ventricle: Normal right ventricular cavity size. Systolic function is normal.    Left Atrium: Left atrium is moderately dilated.    Right Atrium: Right atrium is mildly dilated.    Aortic Valve: . There is severe aortic regurgitation.    Mitral Valve:  There is mild regurgitation.    Pulmonic Valve: There is moderate regurgitation.    IVC/SVC: Intermediate venous pressure at 8 mmHg.      CURRENT/PREVIOUS VISIT EKG  Results for orders placed or performed in visit on 08/06/24   IN OFFICE EKG 12-LEAD (to [place])    Collection Time: 08/06/24 12:02 PM   Result Value Ref Range    QRS Duration 110 ms    [facility] QTC Calculation 464 ms    Narrative    Test Reason : I42.0,    Vent. Rate : 067 BPM      Atrial Rate : 067 BPM     P-R Int : 214 ms          QRS Dur : 110 ms      QT Int : 440 ms       P-R-T Axes : 052 033 210 degrees     QTc Int : 464 ms    Atrial-paced rhythm with prolonged AV conduction  LVH with repolarization abnormality ( Sokolow-Morocho , Romhilt-Mitchell )  Abnormal ECG  When compared with ECG of 09-MAY-2024 14:43,  T wave inversion no longer evident in Anterior leads  Confirmed by Nathaniel West MD ([id number]) on 9/22/2024 3:59:54 PM    Referred By: DEANNA TOBAR           Confirmed By:Nathaniel West MD     No valid procedures specified.   No results found for this or any previous visit.    No valid procedures specified.    PHYSICAL EXAM  CONSTITUTIONAL: Well built, well nourished in no apparent distress  NECK: no carotid bruit, no JVD  LUNGS:  Mild expiratory wheezing is noted.  CHEST WALL: no tenderness  HEART: regular rate and rhythm, S1, S2 normal, no murmur, click, rub or gallop   ABDOMEN: soft, non-tender; bowel sounds normal; no masses,  no organomegaly  EXTREMITIES: Extremities normal, no edema, no calf tenderness noted  NEURO: AAO X 3    I HAVE REVIEWED :    The vital signs, nurses notes, and all the pertinent radiology and labs.        Current Outpatient Medications   Medication Instructions    acetaminophen (TYLENOL) 500 mg, Oral, Every 6 hours PRN    albuterol (PROVENTIL/VENTOLIN HFA) 90 mcg/actuation inhaler 2 puffs, Inhalation, Daily    amiodarone (PACERONE) 200 mg, Oral, Daily    ascorbic acid (vitamin C) (VITAMIN C) 500 mg, Oral, Daily    atorvastatin (LIPITOR) 80 mg, Oral, Nightly    clopidogreL (PLAVIX) 75 mg, Oral, Daily    docusate sodium (COLACE) 100 mg, Oral, Daily PRN    ENTRESTO 24-26 mg per tablet 1 tablet, Oral, 2 times daily    FeroSuL 325 mg, Oral, Daily    finasteride (PROSCAR) 5 mg, Oral, Daily    folic acid (FOLVITE) 1 mg, Oral, Daily    furosemide (LASIX) 20 mg, Oral, Daily    gabapentin (NEURONTIN) 600 mg, Oral, 3 times daily    leflunomide (ARAVA) 20 mg, Oral, Daily     levothyroxine (SYNTHROID) 112 mcg, Oral, Before breakfast, Take on empty stomach an hour before another other medications.    loratadine (CLARITIN) 10 mg, Oral, Daily    methotrexate 2.5 mg, Oral, Every 7 days    metoprolol succinate (TOPROL-XL) 25 mg, Oral, Daily    nitroGLYCERIN (NITROSTAT) 0.4 mg, Sublingual, Every 5 min PRN    pantoprazole (PROTONIX) 40 mg, Oral, Daily    prednisoLONE acetate (PRED FORTE) 1 % DrpS 1 drop, 4 times daily PRN    sildenafiL (VIAGRA) 25 mg, Oral, Daily PRN    spironolactone (ALDACTONE) 25 mg, Oral, Daily          Assessment & Plan     1. Dilated cardiomyopathy  Assessment & Plan:  He is fairly well compensated at this time  Continue on Entresto 24/26 mg p.o. b.i.d., Aldactone 25 mg a day, and Toprol-XL 25 mg once a day      2. Primary hypertension  Assessment & Plan:  Blood pressure is stable on current therapy at 116/64 mm Hg continue on Entresto spironolactone metoprolol combination      3. Mitral valve insufficiency and aortic valve insufficiency  Assessment & Plan:  Clinically stable fairly well compensated      4. Paroxysmal atrial fibrillation  Assessment & Plan:  Now in regular rhythm continue on Plavix 75 mg daily amiodarone 200 mg daily.  Clinically stable      5. Dyslipidemia  Assessment & Plan:  He is on Lipitor 80 mg nightly maintain low-fat low-cholesterol diet      6. Thyroid disease  Assessment & Plan:  Continue on levothyroxine at 112 mcg a day            No follow-ups on file.

## 2024-09-25 NOTE — ASSESSMENT & PLAN NOTE
Blood pressure is stable on current therapy at 116/64 mm Hg continue on Entresto spironolactone metoprolol combination

## 2024-09-25 NOTE — ASSESSMENT & PLAN NOTE
He is fairly well compensated at this time  Continue on Entresto 24/26 mg p.o. b.i.d., Aldactone 25 mg a day, and Toprol-XL 25 mg once a day

## 2024-11-05 ENCOUNTER — CLINICAL SUPPORT (OUTPATIENT)
Dept: CARDIOLOGY | Facility: CLINIC | Age: 74
End: 2024-11-05

## 2024-11-05 ENCOUNTER — HOSPITAL ENCOUNTER (OUTPATIENT)
Dept: CARDIOLOGY | Facility: CLINIC | Age: 74
Discharge: HOME | End: 2024-11-05
Attending: INTERNAL MEDICINE
Payer: MEDICARE

## 2024-11-05 DIAGNOSIS — I49.8 OTHER SPECIFIED CARDIAC ARRHYTHMIAS: ICD-10-CM

## 2024-11-05 DIAGNOSIS — Z95.810 PRESENCE OF AUTOMATIC (IMPLANTABLE) CARDIAC DEFIBRILLATOR: ICD-10-CM

## 2024-11-05 PROCEDURE — 93296 REM INTERROG EVL PM/IDS: CPT | Mod: PN | Performed by: INTERNAL MEDICINE

## 2024-11-05 PROCEDURE — 93295 DEV INTERROG REMOTE 1/2/MLT: CPT | Mod: S$GLB,,, | Performed by: INTERNAL MEDICINE

## 2024-12-19 ENCOUNTER — TELEPHONE (OUTPATIENT)
Dept: HEMATOLOGY/ONCOLOGY | Facility: CLINIC | Age: 74
End: 2024-12-19
Payer: MEDICARE

## 2024-12-19 DIAGNOSIS — R79.89 ELEVATED FERRITIN: Primary | ICD-10-CM

## 2024-12-19 DIAGNOSIS — R71.8 LOW MEAN CORPUSCULAR VOLUME (MCV): ICD-10-CM

## 2024-12-19 NOTE — NURSING
Returned call to patient no answer left message on VM to call back   Oncology Navigation   Intake  Cancer Type: Benign hem  Type of Referral: External  Date of Referral: 12/16/24 (Received 12/19/2024)     Treatment                              Acuity      Follow Up  No follow-ups on file.

## 2024-12-19 NOTE — TELEPHONE ENCOUNTER
----- Message from Karlene sent at 12/19/2024  2:21 PM CST -----  Pt calling to make an appt with Dr. Schroeder. He is being referred by Evan Tello NP for hematology. Referral under media 12/18/2024       [phone]

## 2025-01-06 ENCOUNTER — OFFICE VISIT (OUTPATIENT)
Facility: CLINIC | Age: 75
End: 2025-01-06
Payer: MEDICARE

## 2025-01-06 ENCOUNTER — LAB VISIT (OUTPATIENT)
Dept: LAB | Facility: HOSPITAL | Age: 75
End: 2025-01-06
Attending: INTERNAL MEDICINE
Payer: MEDICARE

## 2025-01-06 VITALS
BODY MASS INDEX: 21.82 KG/M2 | RESPIRATION RATE: 16 BRPM | DIASTOLIC BLOOD PRESSURE: 85 MMHG | TEMPERATURE: 98 F | HEIGHT: 68 IN | SYSTOLIC BLOOD PRESSURE: 126 MMHG | HEART RATE: 80 BPM | WEIGHT: 144 LBS

## 2025-01-06 DIAGNOSIS — R79.89 ELEVATED FERRITIN: ICD-10-CM

## 2025-01-06 DIAGNOSIS — R71.8 LOW MEAN CORPUSCULAR VOLUME (MCV): ICD-10-CM

## 2025-01-06 DIAGNOSIS — M06.9 RHEUMATOID ARTHRITIS, INVOLVING UNSPECIFIED SITE, UNSPECIFIED WHETHER RHEUMATOID FACTOR PRESENT: ICD-10-CM

## 2025-01-06 DIAGNOSIS — D72.819 LEUKOPENIA, UNSPECIFIED TYPE: ICD-10-CM

## 2025-01-06 DIAGNOSIS — Z95.810 ICD (IMPLANTABLE CARDIOVERTER-DEFIBRILLATOR), DUAL, IN SITU: ICD-10-CM

## 2025-01-06 DIAGNOSIS — I08.0 MITRAL VALVE INSUFFICIENCY AND AORTIC VALVE INSUFFICIENCY: ICD-10-CM

## 2025-01-06 DIAGNOSIS — I77.811 ABDOMINAL AORTIC ECTASIA: ICD-10-CM

## 2025-01-06 DIAGNOSIS — I48.0 PAROXYSMAL ATRIAL FIBRILLATION: ICD-10-CM

## 2025-01-06 DIAGNOSIS — Z85.850 HISTORY OF THYROID CANCER: ICD-10-CM

## 2025-01-06 DIAGNOSIS — I10 PRIMARY HYPERTENSION: ICD-10-CM

## 2025-01-06 DIAGNOSIS — D50.9 MICROCYTIC ANEMIA: Primary | ICD-10-CM

## 2025-01-06 DIAGNOSIS — E89.0 H/O THYROIDECTOMY: ICD-10-CM

## 2025-01-06 DIAGNOSIS — D86.9 SARCOIDOSIS: ICD-10-CM

## 2025-01-06 DIAGNOSIS — D86.85 SARCOID MYOCARDITIS: ICD-10-CM

## 2025-01-06 DIAGNOSIS — E07.9 THYROID DISEASE: ICD-10-CM

## 2025-01-06 LAB
LABCORP MISC TEST CODE: NORMAL
LABCORP MISC TEST NAME: NORMAL

## 2025-01-06 PROCEDURE — 81256 HFE GENE: CPT | Performed by: INTERNAL MEDICINE

## 2025-01-06 PROCEDURE — 36415 COLL VENOUS BLD VENIPUNCTURE: CPT | Performed by: INTERNAL MEDICINE

## 2025-01-06 PROCEDURE — 83020 HEMOGLOBIN ELECTROPHORESIS: CPT | Performed by: INTERNAL MEDICINE

## 2025-01-06 PROCEDURE — 99999 PR PBB SHADOW E&M-EST. PATIENT-LVL V: CPT | Mod: PBBFAC,,, | Performed by: INTERNAL MEDICINE

## 2025-01-06 PROCEDURE — 30000890 LABCORP MISCELLANEOUS TEST: Performed by: INTERNAL MEDICINE

## 2025-01-06 PROCEDURE — 81257 HBA1/HBA2 GENE: CPT | Performed by: INTERNAL MEDICINE

## 2025-01-06 NOTE — LETTER
January 6, 2025        Martín Tello, MINI  [address]  [address]  [place] [address]             [facility]  [address]  [address]  [place] [address]  Phone: [phone]  Fax: [phone]   Patient: Trevor Madrid   MR Number: [id number]   YOB: 1950   Date of Visit: 1/6/2025       Dear Dr. Tello:    Thank you for referring Trevor Madrid to me for evaluation. Below are the relevant portions of my assessment and plan of care.    1. Elevated ferritin    2. Low mean corpuscular volume (MCV)      Elevated ferritin  -     Ambulatory referral/consult to Hematology / Oncology  -     Hemoglobin Electrophoresis Cascade, Blood; Future; Expected date: 01/06/2025  -     HEMOCHROMATOSIS DNA ANALYSIS (PCR); Future; Expected date: 01/06/2025  -     Alpha-Globin Gene Analysis; Future; Expected date: 01/06/2025  -     Phlebotomy therapeutic; Standing    Low mean corpuscular volume (MCV)  -     Ambulatory referral/consult to Hematology / Oncology  -     Hemoglobin Electrophoresis Cascade, Blood; Future; Expected date: 01/06/2025  -     HEMOCHROMATOSIS DNA ANALYSIS (PCR); Future; Expected date: 01/06/2025  -     Alpha-Globin Gene Analysis; Future; Expected date: 01/06/2025          I have explained and the patient understands all of  the current recommendation(s). I have answered all of their questions to the best of my ability and to their complete satisfaction.                   If you have questions, please do not hesitate to call me. I look forward to following Trevor along with you.    Sincerely,      JUMANA Toney MD           CC  No Recipients

## 2025-01-06 NOTE — PROGRESS NOTES
[facility] [address] Hematology Oncology In Office Initial Encounter Note    1/6/25    Subjective:      Patient ID:   Trevor Madrid  74 y.o. male  1950  Evan Tello NP      Chief Complaint:   Elevated ferritin - Low mean corpuscular volume (New patient)      HPI:  74 y.o. male referred for evaluation of microcytic anemia and increased ferritin.  Hemoglobin is 11.4, MCV is 74, RDW is 14.5, white blood cell count is 3400 and platelet count is 219 1000.  His ferritin level is 450.  In reviewing his medication list, ferrous sulfa is listed, but he says that he is not taking an iron pill supplement.    He denies history of yellow jaundice, hepatitis, cirrhosis of the liver.    He has significant cardiac history of cardiomyopathy, paroxysmal atrial fibrillation, mitral valve and aortic valve insufficiency, and abdominal aortic ectasia.  Also congestive heart failure.  He has coronary stents in place  Other history includes sarcoidosis, hypertension, high cholesterol, history of thyroidectomy for management of thyroid cancer.  Rheumatoid arthritis COPD, sleep apnea syndrome.  Per the chart there is a history of histoplasmosis.    He is status post placement of an cardioverter defibrillator, thyroidectomy 7 or 8 years ago.    He does not smoke, none times 10 years.  he does not drink alcohol regularly, sometimes vodka.  He does not have a history of drug allergies.  He is on disability.    Medication list reviewed and includes Plavix, Entresto, folic acid, Synthroid, ferrous sulfate, I remove this from his list.    His father had hypertension, 2 sisters passed away of lung cancer      ROS:   GEN: normal without any fever, night sweats or weight loss  HEENT: normal with no HA's, sore throat, stiff neck, changes in vision  CV: See HPI  PULM: See HPI  GI: normal with no abdominal pain, nausea, vomiting, constipation, diarrhea, melanotic stools, BRBPR, or hematemesis  : normal with no hematuria, dysuria  BREAST: normal  with no mass, discharge, pain  SKIN: normal with no rash, erythema, bruising, or swelling     Past Medical History:   Diagnosis Date    A-fib     AICD (automatic cardioverter/defibrillator) present     Arthritis     Atrial fibrillation     Cancer     Cardiomyopathy     COPD (chronic obstructive pulmonary disease)     Heart failure     Histoplasmosis     Hypertension     Sarcoidosis     Sleep apnea     Thyroid disease      Past Surgical History:   Procedure Laterality Date    CARDIAC CATHETERIZATION      CARDIAC DEFIBRILLATOR PLACEMENT Left 01/2020    CORONARY ANGIOGRAPHY Left 9/16/2019    Procedure: CATHETERIZATION, HEART, LEFT (RIGHT RADIAL);  Surgeon: Gerald Romero MD;  Location: [facility] CATH/EP LAB;  Service: Cardiology;  Laterality: Left;    CORONARY STENT PLACEMENT      INSERTION OF BIVENTRICULAR IMPLANTABLE CARDIOVERTER-DEFIBRILLATOR (ICD)      TOTAL THYROIDECTOMY         Review of patient's allergies indicates:  No Known Allergies  Social History     Socioeconomic History    Marital status: Legally    Tobacco Use    Smoking status: Former     Current packs/day: 0.00     Types: Cigarettes     Quit date: 9/12/2014     Years since quitting: 10.3    Smokeless tobacco: Former   Substance and Sexual Activity    Alcohol use: Not Currently    Drug use: Never   Social History Narrative    ** Merged History Encounter **          Social Drivers of Health     Financial Resource Strain: Low Risk  (6/7/2024)    Overall Financial Resource Strain (CARDIA)     Difficulty of Paying Living Expenses: Not very hard   Food Insecurity: No Food Insecurity (6/7/2024)    Hunger Vital Sign     Worried About Running Out of Food in the Last Year: Never true     Ran Out of Food in the Last Year: Never true   Transportation Needs: Unmet Transportation Needs (6/7/2024)    TRANSPORTATION NEEDS     Transportation : Yes, it has kept me from medical appointments or from getting my medications.   Physical Activity: Sufficiently  Active (6/7/2024)    Exercise Vital Sign     Days of Exercise per Week: 5 days     Minutes of Exercise per Session: 30 min   Stress: No Stress Concern Present (6/7/2024)    [employer] - Occupational Stress Questionnaire     Feeling of Stress : Not at all   Housing Stability: Low Risk  (6/7/2024)    Housing Stability Vital Sign     Unable to Pay for Housing in the Last Year: No     Homeless in the Last Year: No         Current Outpatient Medications:     acetaminophen (TYLENOL) 500 MG tablet, Take 500 mg by mouth every 6 (six) hours as needed for Pain., Disp: , Rfl:     albuterol (PROVENTIL/VENTOLIN HFA) 90 mcg/actuation inhaler, Inhale 2 puffs into the lungs once daily., Disp: , Rfl:     amiodarone (PACERONE) 200 MG Tab, Take 1 tablet (200 mg total) by mouth once daily., Disp: 30 tablet, Rfl: 0    ascorbic acid, vitamin C, (VITAMIN C) 500 MG tablet, Take 500 mg by mouth once daily., Disp: , Rfl:     atorvastatin (LIPITOR) 80 MG tablet, Take 1 tablet (80 mg total) by mouth every evening., Disp: 30 tablet, Rfl: 0    clopidogreL (PLAVIX) 75 mg tablet, Take 1 tablet (75 mg total) by mouth once daily., Disp: 30 tablet, Rfl: 0    docusate sodium (COLACE) 100 MG capsule, Take 1 capsule (100 mg total) by mouth daily as needed for Constipation., Disp: 30 capsule, Rfl: 0    ENTRESTO 24-26 mg per tablet, TAKE 1 TABLET BY MOUTH TWICE DAILY, Disp: 180 tablet, Rfl: 3    ferrous sulfate (FEOSOL) 325 mg (65 mg iron) Tab tablet, Take 1 tablet (325 mg total) by mouth once daily., Disp: 30 tablet, Rfl: 0    finasteride (PROSCAR) 5 mg tablet, Take 1 tablet (5 mg total) by mouth once daily., Disp: 30 tablet, Rfl: 0    folic acid (FOLVITE) 1 MG tablet, Take 1 tablet (1 mg total) by mouth once daily., Disp: 30 tablet, Rfl: 0    furosemide (LASIX) 20 MG tablet, Take 1 tablet (20 mg total) by mouth once daily., Disp: 30 tablet, Rfl: 0    gabapentin (NEURONTIN) 600 MG tablet, Take 1 tablet (600 mg total) by  "mouth 3 (three) times daily., Disp: 90 tablet, Rfl: 0    leflunomide (ARAVA) 20 MG Tab, Take 1 tablet (20 mg total) by mouth once daily., Disp: 30 tablet, Rfl: 0    levothyroxine (SYNTHROID) 112 MCG tablet, Take 1 tablet (112 mcg total) by mouth before breakfast. Take on empty stomach an hour before another other medications., Disp: 90 tablet, Rfl: 3    loratadine (CLARITIN) 10 mg tablet, Take 1 tablet (10 mg total) by mouth once daily., Disp: 30 tablet, Rfl: 0    methotrexate 2.5 MG Tab, Take 2.5 mg by mouth every 7 days., Disp: , Rfl:     metoprolol succinate (TOPROL-XL) 25 MG 24 hr tablet, Take 1 tablet (25 mg total) by mouth once daily., Disp: 30 tablet, Rfl: 0    nitroGLYCERIN (NITROSTAT) 0.4 MG SL tablet, Place 0.4 mg under the tongue every 5 (five) minutes as needed for Chest pain., Disp: , Rfl:     pantoprazole (PROTONIX) 40 MG tablet, Take 40 mg by mouth once daily., Disp: , Rfl:     prednisoLONE acetate (PRED FORTE) 1 % DrpS, 1 drop 4 (four) times daily as needed., Disp: , Rfl:     sildenafiL (VIAGRA) 50 MG tablet, Take 25 mg by mouth daily as needed., Disp: , Rfl:     spironolactone (ALDACTONE) 25 MG tablet, Take 1 tablet (25 mg total) by mouth once daily., Disp: 30 tablet, Rfl: 0          Objective:   Vitals:  Blood pressure 126/85, pulse 80, temperature 97.5 °F (36.4 °C), temperature source Temporal, resp. rate 16, height 5' 8" (1.727 m), weight 65.3 kg (144 lb).    Physical Examination:   GEN: no apparent distress, comfortable  HEAD: atraumatic and normocephalic  EYES: no pallor, no icterus  ENT: no pharyngeal erythema, external ears WNL; no nasal discharge  NECK: no masses, thyroid normal, trachea midline, no LAD/LN's, supple  CV: RRR with  murmur; normal pulse  CHEST: Normal respiratory effort; CTAB; normal breath sounds; no wheeze or crackles  ABDOM: nontender and nondistended; soft;  no rebound/guarding, L/S NP  MUSC/Skeletal: ROM normal; no crepitus; joints normal  EXTREM: no clubbing, cyanosis, " inflammation or swelling  SKIN: no rashes, lesions, ulcers, petechiae   : no cvat  NEURO: grossly intact; motor/sensory WNL;  no tremors  PSYCH: normal mood, affect and behavior  LYMPH: normal cervical, supraclavicular LN's      Labs:   Lab Results   Component Value Date    WBC 3.19 (L) 05/09/2024    HGB 13.4 (L) 05/09/2024    HCT 43.3 05/09/2024    MCV 78 (L) 05/09/2024     05/09/2024    CMP  Sodium   Date Value Ref Range Status   05/09/2024 137 136 - 145 mmol/L Final     Potassium   Date Value Ref Range Status   05/09/2024 4.4 3.5 - 5.1 mmol/L Final     Chloride   Date Value Ref Range Status   05/09/2024 100 95 - 110 mmol/L Final     CO2   Date Value Ref Range Status   05/09/2024 30 (H) 23 - 29 mmol/L Final     Glucose   Date Value Ref Range Status   05/09/2024 91 70 - 110 mg/dL Final     BUN   Date Value Ref Range Status   05/09/2024 31 (H) 8 - 23 mg/dL Final     Creatinine   Date Value Ref Range Status   05/09/2024 1.2 0.5 - 1.4 mg/dL Final     Calcium   Date Value Ref Range Status   05/09/2024 9.5 8.7 - 10.5 mg/dL Final     Total Protein   Date Value Ref Range Status   05/07/2024 7.2 6.0 - 8.4 g/dL Final     Albumin   Date Value Ref Range Status   05/07/2024 3.6 3.5 - 5.2 g/dL Final     Total Bilirubin   Date Value Ref Range Status   05/07/2024 0.4 0.1 - 1.0 mg/dL Final     Comment:     For infants and newborns, interpretation of results should be based  on gestational age, weight and in agreement with clinical  observations.    Premature Infant recommended reference ranges:  Up to 24 hours.............<8.0 mg/dL  Up to 48 hours............<12.0 mg/dL  3-5 days..................<15.0 mg/dL  6-29 days.................<15.0 mg/dL       Alkaline Phosphatase   Date Value Ref Range Status   05/07/2024 62 55 - 135 U/L Final     AST   Date Value Ref Range Status   05/07/2024 18 10 - 40 U/L Final     ALT   Date Value Ref Range Status   05/07/2024 10 10 - 44 U/L Final     Anion Gap   Date Value Ref Range Status    05/09/2024 7 (L) 8 - 16 mmol/L Final     eGFR if    Date Value Ref Range Status   11/10/2021 >60.0 >60 mL/min/1.73 m^2 Final     eGFR if non    Date Value Ref Range Status   11/10/2021 >60.0 >60 mL/min/1.73 m^2 Final     Comment:     Calculation used to obtain the estimated glomerular filtration  rate (eGFR) is the CKD-EPI equation.      Hemoglobin is 11.4, MCV is 74, RDW is 14.5, white blood cell count is 3400, ferritin is 450      Assessment:   (1) 74 y.o. male with mild microcytic anemia with a normal RDW and increased ferritin of 450.  Alpha and beta thalassemia is in the differential here.  I have ordered appropriate hemoglobinopathy studies to clarify the situation here.  Alpha or beta thalassemia minor or trait would give us a mild anemia of this degree with microcytosis, normal RDW, and he would not be iron deficient.  I have advised that he not take iron supplements.    (2) he does have increased ferritin at 450.  I have ordered hemochromatosis DNA studies to see if there is a hereditary component to the increased ferritin.    (3) I have started him on a monthly phlebotomy schedule through the blood center to try and deplete excessive ferritin levels and get him down to a ferritin of a proximally 100.  He will have a pt of whole blood taken off monthly through the blood center and we will recheck his CBC and ferritin in 6 months' time.  Fingerstick hematocrit will be checked 1st his baseline hematocrit is 35.  If the hematocrit is 30 or less, then the phlebotomy will be deferred for that month.  If the hematocrit is greater than 30, then the blood center will proceed with the phlebotomy of 1 unit of whole blood.    Return to clinic and see myself in 6 months with CBC and ferritin.          1. Elevated ferritin    2. Low mean corpuscular volume (MCV)        Plan:       Elevated ferritin  -     Ambulatory referral/consult to Hematology / Oncology  -     Hemoglobin  Electrophoresis Cascade, Blood; Future; Expected date: 01/06/2025  -     HEMOCHROMATOSIS DNA ANALYSIS (PCR); Future; Expected date: 01/06/2025  -     Alpha-Globin Gene Analysis; Future; Expected date: 01/06/2025  -     Phlebotomy therapeutic; Standing    Low mean corpuscular volume (MCV)  -     Ambulatory referral/consult to Hematology / Oncology  -     Hemoglobin Electrophoresis Cascade, Blood; Future; Expected date: 01/06/2025  -     HEMOCHROMATOSIS DNA ANALYSIS (PCR); Future; Expected date: 01/06/2025  -     Alpha-Globin Gene Analysis; Future; Expected date: 01/06/2025          I have explained and the patient understands all of  the current recommendation(s). I have answered all of their questions to the best of my ability and to their complete satisfaction.

## 2025-01-07 LAB
LABCORP MISC TEST CODE: NORMAL
LABCORP MISC TEST NAME: NORMAL
LABCORP MISCELLANEOUS TEST: NORMAL

## 2025-01-10 LAB
LABCORP MISC TEST CODE: NORMAL
LABCORP MISC TEST NAME: NORMAL
LABCORP MISCELLANEOUS TEST: NORMAL

## 2025-02-04 ENCOUNTER — HOSPITAL ENCOUNTER (OUTPATIENT)
Dept: CARDIOLOGY | Facility: CLINIC | Age: 75
Discharge: HOME | End: 2025-02-04
Attending: INTERNAL MEDICINE
Payer: MEDICARE

## 2025-02-04 ENCOUNTER — CLINICAL SUPPORT (OUTPATIENT)
Dept: CARDIOLOGY | Facility: CLINIC | Age: 75
End: 2025-02-04
Payer: MEDICARE

## 2025-02-04 DIAGNOSIS — Z95.810 PRESENCE OF AUTOMATIC (IMPLANTABLE) CARDIAC DEFIBRILLATOR: ICD-10-CM

## 2025-02-04 DIAGNOSIS — I49.8 OTHER SPECIFIED CARDIAC ARRHYTHMIAS: ICD-10-CM

## 2025-02-04 PROCEDURE — 93295 DEV INTERROG REMOTE 1/2/MLT: CPT | Mod: S$GLB,,, | Performed by: INTERNAL MEDICINE

## 2025-02-04 PROCEDURE — 93296 REM INTERROG EVL PM/IDS: CPT | Mod: PN | Performed by: INTERNAL MEDICINE

## 2025-02-11 ENCOUNTER — TELEPHONE (OUTPATIENT)
Dept: CARDIOLOGY | Facility: CLINIC | Age: 75
End: 2025-02-11
Payer: MEDICARE

## 2025-02-11 LAB
OHS CV AF BURDEN PERCENT: < 1
OHS CV DC REMOTE DEVICE TYPE: NORMAL
OHS CV ICD SHOCK: NO
OHS CV RV PACING PERCENT: 4.56 %

## 2025-02-11 NOTE — TELEPHONE ENCOUNTER
----- Message from Melva sent at 2/11/2025  2:16 PM CST -----  Regarding: appt  Type:  Sooner Apoointment Request      Name of Caller:pt    When is the first available appointment?dept booked     Symptoms:6m fu      Best Call Back Number:[phone]      Additional Information: pt looking to get schedule. Last visit was 09/25/24.  please call to discuss.

## 2025-02-13 LAB
OHS CV AF BURDEN PERCENT: < 1
OHS CV DC REMOTE DEVICE TYPE: NORMAL
OHS CV RV PACING PERCENT: 3.82 %

## 2025-02-24 ENCOUNTER — HOSPITAL ENCOUNTER (INPATIENT)
Facility: HOSPITAL | Age: 75
LOS: 1 days | Discharge: HOME | DRG: 291 | End: 2025-02-26
Attending: EMERGENCY MEDICINE | Admitting: STUDENT IN AN ORGANIZED HEALTH CARE EDUCATION/TRAINING PROGRAM
Payer: MEDICARE

## 2025-02-24 DIAGNOSIS — I50.43 ACUTE ON CHRONIC COMBINED SYSTOLIC (CONGESTIVE) AND DIASTOLIC (CONGESTIVE) HEART FAILURE: ICD-10-CM

## 2025-02-24 DIAGNOSIS — M79.604 BILATERAL LEG PAIN: ICD-10-CM

## 2025-02-24 DIAGNOSIS — I48.0 PAROXYSMAL ATRIAL FIBRILLATION: ICD-10-CM

## 2025-02-24 DIAGNOSIS — R94.31 PROLONGED Q-T INTERVAL ON ECG: ICD-10-CM

## 2025-02-24 DIAGNOSIS — M79.605 BILATERAL LEG PAIN: ICD-10-CM

## 2025-02-24 DIAGNOSIS — R07.9 CHEST PAIN: ICD-10-CM

## 2025-02-24 DIAGNOSIS — R06.02 SOB (SHORTNESS OF BREATH): Primary | ICD-10-CM

## 2025-02-24 PROBLEM — I25.10 ATHEROSCLEROTIC HEART DISEASE OF NATIVE CORONARY ARTERY WITHOUT ANGINA PECTORIS: Status: ACTIVE | Noted: 2020-08-10

## 2025-02-24 PROBLEM — J44.9 COPD (CHRONIC OBSTRUCTIVE PULMONARY DISEASE): Status: ACTIVE | Noted: 2018-04-23

## 2025-02-24 PROBLEM — N40.0 BENIGN PROSTATIC HYPERPLASIA: Status: ACTIVE | Noted: 2018-02-06

## 2025-02-24 PROBLEM — Z98.61 HISTORY OF PERCUTANEOUS TRANSLUMINAL CORONARY ANGIOPLASTY: Status: ACTIVE | Noted: 2020-08-10

## 2025-02-24 PROBLEM — K21.9 GASTROESOPHAGEAL REFLUX DISEASE: Status: ACTIVE | Noted: 2018-02-06

## 2025-02-24 PROBLEM — D86.2 SARCOIDOSIS OF LUNG WITH SARCOIDOSIS OF LYMPH NODES: Status: ACTIVE | Noted: 2020-08-10

## 2025-02-24 LAB
ALBUMIN SERPL BCP-MCNC: 3.5 G/DL (ref 3.5–5.2)
ALP SERPL-CCNC: 66 U/L (ref 55–135)
ALT SERPL W/O P-5'-P-CCNC: 11 U/L (ref 10–44)
ANION GAP SERPL CALC-SCNC: 9 MMOL/L (ref 8–16)
AST SERPL-CCNC: 17 U/L (ref 10–40)
BASOPHILS # BLD AUTO: 0.05 K/UL (ref 0–0.2)
BASOPHILS NFR BLD: 1.2 % (ref 0–1.9)
BILIRUB SERPL-MCNC: 0.8 MG/DL (ref 0.1–1)
BNP SERPL-MCNC: 1062 PG/ML (ref 0–99)
BUN SERPL-MCNC: 11 MG/DL (ref 8–23)
CALCIUM SERPL-MCNC: 8.3 MG/DL (ref 8.7–10.5)
CHLORIDE SERPL-SCNC: 105 MMOL/L (ref 95–110)
CK SERPL-CCNC: 260 U/L (ref 20–200)
CO2 SERPL-SCNC: 26 MMOL/L (ref 23–29)
CREAT SERPL-MCNC: 0.9 MG/DL (ref 0.5–1.4)
DIFFERENTIAL METHOD BLD: ABNORMAL
EOSINOPHIL # BLD AUTO: 0 K/UL (ref 0–0.5)
EOSINOPHIL NFR BLD: 1 % (ref 0–8)
ERYTHROCYTE [DISTWIDTH] IN BLOOD BY AUTOMATED COUNT: 15.9 % (ref 11.5–14.5)
EST. GFR  (NO RACE VARIABLE): >60 ML/MIN/1.73 M^2
GLUCOSE SERPL-MCNC: 102 MG/DL (ref 70–110)
HCT VFR BLD AUTO: 36.5 % (ref 40–54)
HGB BLD-MCNC: 11 G/DL (ref 14–18)
IMM GRANULOCYTES # BLD AUTO: 0.01 K/UL (ref 0–0.04)
IMM GRANULOCYTES NFR BLD AUTO: 0.2 % (ref 0–0.5)
LYMPHOCYTES # BLD AUTO: 0.4 K/UL (ref 1–4.8)
LYMPHOCYTES NFR BLD: 10.3 % (ref 18–48)
MCH RBC QN AUTO: 24.4 PG (ref 27–31)
MCHC RBC AUTO-ENTMCNC: 30.1 G/DL (ref 32–36)
MCV RBC AUTO: 81 FL (ref 82–98)
MONOCYTES # BLD AUTO: 0.5 K/UL (ref 0.3–1)
MONOCYTES NFR BLD: 13.2 % (ref 4–15)
NEUTROPHILS # BLD AUTO: 3 K/UL (ref 1.8–7.7)
NEUTROPHILS NFR BLD: 74.1 % (ref 38–73)
NRBC BLD-RTO: 0 /100 WBC
PLATELET # BLD AUTO: 245 K/UL (ref 150–450)
PMV BLD AUTO: 11.2 FL (ref 9.2–12.9)
POTASSIUM SERPL-SCNC: 3.4 MMOL/L (ref 3.5–5.1)
PROT SERPL-MCNC: 6.8 G/DL (ref 6–8.4)
RBC # BLD AUTO: 4.5 M/UL (ref 4.6–6.2)
SODIUM SERPL-SCNC: 140 MMOL/L (ref 136–145)
TROPONIN I SERPL HS-MCNC: 37.6 PG/ML (ref 0–14.9)
TROPONIN I SERPL HS-MCNC: 41.6 PG/ML (ref 0–14.9)
WBC # BLD AUTO: 4.08 K/UL (ref 3.9–12.7)

## 2025-02-24 PROCEDURE — 25000003 PHARM REV CODE 250

## 2025-02-24 PROCEDURE — 99285 EMERGENCY DEPT VISIT HI MDM: CPT | Mod: 25

## 2025-02-24 PROCEDURE — G0378 HOSPITAL OBSERVATION PER HR: HCPCS

## 2025-02-24 PROCEDURE — 83880 ASSAY OF NATRIURETIC PEPTIDE: CPT | Performed by: NURSE PRACTITIONER

## 2025-02-24 PROCEDURE — 96365 THER/PROPH/DIAG IV INF INIT: CPT

## 2025-02-24 PROCEDURE — 84484 ASSAY OF TROPONIN QUANT: CPT | Mod: 91 | Performed by: EMERGENCY MEDICINE

## 2025-02-24 PROCEDURE — 63600175 PHARM REV CODE 636 W HCPCS

## 2025-02-24 PROCEDURE — 93010 ELECTROCARDIOGRAM REPORT: CPT | Mod: ,,, | Performed by: INTERNAL MEDICINE

## 2025-02-24 PROCEDURE — 82550 ASSAY OF CK (CPK): CPT | Performed by: NURSE PRACTITIONER

## 2025-02-24 PROCEDURE — 36415 COLL VENOUS BLD VENIPUNCTURE: CPT | Performed by: NURSE PRACTITIONER

## 2025-02-24 PROCEDURE — 85025 COMPLETE CBC W/AUTO DIFF WBC: CPT | Performed by: NURSE PRACTITIONER

## 2025-02-24 PROCEDURE — 93005 ELECTROCARDIOGRAM TRACING: CPT | Performed by: INTERNAL MEDICINE

## 2025-02-24 PROCEDURE — 84484 ASSAY OF TROPONIN QUANT: CPT | Performed by: NURSE PRACTITIONER

## 2025-02-24 PROCEDURE — 96375 TX/PRO/DX INJ NEW DRUG ADDON: CPT

## 2025-02-24 PROCEDURE — 96366 THER/PROPH/DIAG IV INF ADDON: CPT

## 2025-02-24 PROCEDURE — 80053 COMPREHEN METABOLIC PANEL: CPT | Performed by: NURSE PRACTITIONER

## 2025-02-24 RX ORDER — SODIUM,POTASSIUM PHOSPHATES 280-250MG
2 POWDER IN PACKET (EA) ORAL
Status: DISCONTINUED | OUTPATIENT
Start: 2025-02-25 | End: 2025-02-26 | Stop reason: HOSPADM

## 2025-02-24 RX ORDER — FUROSEMIDE 10 MG/ML
40 INJECTION INTRAMUSCULAR; INTRAVENOUS
Status: COMPLETED | OUTPATIENT
Start: 2025-02-24 | End: 2025-02-24

## 2025-02-24 RX ORDER — NALOXONE HCL 0.4 MG/ML
0.02 VIAL (ML) INJECTION
Status: DISCONTINUED | OUTPATIENT
Start: 2025-02-25 | End: 2025-02-26 | Stop reason: HOSPADM

## 2025-02-24 RX ORDER — SACUBITRIL AND VALSARTAN 24; 26 MG/1; MG/1
1 TABLET, FILM COATED ORAL 2 TIMES DAILY
Status: DISCONTINUED | OUTPATIENT
Start: 2025-02-25 | End: 2025-02-26 | Stop reason: HOSPADM

## 2025-02-24 RX ORDER — GLUCAGON 1 MG
1 KIT INJECTION
Status: DISCONTINUED | OUTPATIENT
Start: 2025-02-25 | End: 2025-02-26 | Stop reason: HOSPADM

## 2025-02-24 RX ORDER — AMIODARONE HYDROCHLORIDE 200 MG/1
200 TABLET ORAL DAILY
Status: DISCONTINUED | OUTPATIENT
Start: 2025-02-25 | End: 2025-02-26 | Stop reason: HOSPADM

## 2025-02-24 RX ORDER — IBUPROFEN 200 MG
24 TABLET ORAL
Status: DISCONTINUED | OUTPATIENT
Start: 2025-02-25 | End: 2025-02-26 | Stop reason: HOSPADM

## 2025-02-24 RX ORDER — ASCORBIC ACID 500 MG
500 TABLET ORAL DAILY
Status: DISCONTINUED | OUTPATIENT
Start: 2025-02-25 | End: 2025-02-26 | Stop reason: HOSPADM

## 2025-02-24 RX ORDER — FUROSEMIDE 40 MG/1
40 TABLET ORAL
Status: DISCONTINUED | OUTPATIENT
Start: 2025-02-24 | End: 2025-02-24

## 2025-02-24 RX ORDER — LANOLIN ALCOHOL/MO/W.PET/CERES
800 CREAM (GRAM) TOPICAL
Status: DISCONTINUED | OUTPATIENT
Start: 2025-02-25 | End: 2025-02-26 | Stop reason: HOSPADM

## 2025-02-24 RX ORDER — TALC
9 POWDER (GRAM) TOPICAL NIGHTLY PRN
Status: DISCONTINUED | OUTPATIENT
Start: 2025-02-25 | End: 2025-02-26 | Stop reason: HOSPADM

## 2025-02-24 RX ORDER — ALUMINUM HYDROXIDE, MAGNESIUM HYDROXIDE, AND SIMETHICONE 1200; 120; 1200 MG/30ML; MG/30ML; MG/30ML
30 SUSPENSION ORAL 4 TIMES DAILY PRN
Status: DISCONTINUED | OUTPATIENT
Start: 2025-02-25 | End: 2025-02-26 | Stop reason: HOSPADM

## 2025-02-24 RX ORDER — SODIUM CHLORIDE 0.9 % (FLUSH) 0.9 %
10 SYRINGE (ML) INJECTION
Status: DISCONTINUED | OUTPATIENT
Start: 2025-02-25 | End: 2025-02-26 | Stop reason: HOSPADM

## 2025-02-24 RX ORDER — MAGNESIUM SULFATE 1 G/100ML
1 INJECTION INTRAVENOUS ONCE
Status: COMPLETED | OUTPATIENT
Start: 2025-02-24 | End: 2025-02-25

## 2025-02-24 RX ORDER — FUROSEMIDE 10 MG/ML
40 INJECTION INTRAMUSCULAR; INTRAVENOUS EVERY 12 HOURS
Status: DISCONTINUED | OUTPATIENT
Start: 2025-02-25 | End: 2025-02-25

## 2025-02-24 RX ORDER — TAMSULOSIN HYDROCHLORIDE 0.4 MG/1
1 CAPSULE ORAL NIGHTLY
COMMUNITY

## 2025-02-24 RX ORDER — FINASTERIDE 5 MG/1
5 TABLET, FILM COATED ORAL DAILY
Status: DISCONTINUED | OUTPATIENT
Start: 2025-02-25 | End: 2025-02-26 | Stop reason: HOSPADM

## 2025-02-24 RX ORDER — PANTOPRAZOLE SODIUM 40 MG/1
40 TABLET, DELAYED RELEASE ORAL DAILY
Status: DISCONTINUED | OUTPATIENT
Start: 2025-02-25 | End: 2025-02-26 | Stop reason: HOSPADM

## 2025-02-24 RX ORDER — SPIRONOLACTONE 25 MG/1
25 TABLET ORAL DAILY
Status: DISCONTINUED | OUTPATIENT
Start: 2025-02-25 | End: 2025-02-26 | Stop reason: HOSPADM

## 2025-02-24 RX ORDER — CLOPIDOGREL BISULFATE 75 MG/1
75 TABLET ORAL DAILY
Status: DISCONTINUED | OUTPATIENT
Start: 2025-02-25 | End: 2025-02-25

## 2025-02-24 RX ORDER — METOPROLOL SUCCINATE 25 MG/1
25 TABLET, EXTENDED RELEASE ORAL DAILY
Status: DISCONTINUED | OUTPATIENT
Start: 2025-02-25 | End: 2025-02-26 | Stop reason: HOSPADM

## 2025-02-24 RX ORDER — TAMSULOSIN HYDROCHLORIDE 0.4 MG/1
1 CAPSULE ORAL NIGHTLY
Status: DISCONTINUED | OUTPATIENT
Start: 2025-02-25 | End: 2025-02-26 | Stop reason: HOSPADM

## 2025-02-24 RX ORDER — AMOXICILLIN 250 MG
1 CAPSULE ORAL 2 TIMES DAILY PRN
Status: DISCONTINUED | OUTPATIENT
Start: 2025-02-25 | End: 2025-02-26 | Stop reason: HOSPADM

## 2025-02-24 RX ORDER — IBUPROFEN 200 MG
16 TABLET ORAL
Status: DISCONTINUED | OUTPATIENT
Start: 2025-02-25 | End: 2025-02-26 | Stop reason: HOSPADM

## 2025-02-24 RX ORDER — IPRATROPIUM BROMIDE AND ALBUTEROL SULFATE 2.5; .5 MG/3ML; MG/3ML
3 SOLUTION RESPIRATORY (INHALATION) EVERY 6 HOURS PRN
Status: DISCONTINUED | OUTPATIENT
Start: 2025-02-25 | End: 2025-02-26 | Stop reason: HOSPADM

## 2025-02-24 RX ORDER — ACETAMINOPHEN 325 MG/1
650 TABLET ORAL EVERY 4 HOURS PRN
Status: DISCONTINUED | OUTPATIENT
Start: 2025-02-25 | End: 2025-02-26 | Stop reason: HOSPADM

## 2025-02-24 RX ORDER — AZELASTINE 1 MG/ML
2 SPRAY, METERED NASAL 2 TIMES DAILY
COMMUNITY

## 2025-02-24 RX ORDER — LEVOTHYROXINE SODIUM 125 UG/1
125 TABLET ORAL DAILY
COMMUNITY
Start: 2024-12-16 | End: 2025-12-16

## 2025-02-24 RX ORDER — ONDANSETRON HYDROCHLORIDE 2 MG/ML
4 INJECTION, SOLUTION INTRAVENOUS EVERY 6 HOURS PRN
Status: DISCONTINUED | OUTPATIENT
Start: 2025-02-25 | End: 2025-02-26 | Stop reason: HOSPADM

## 2025-02-24 RX ADMIN — POTASSIUM BICARBONATE 50 MEQ: 978 TABLET, EFFERVESCENT ORAL at 11:02

## 2025-02-24 RX ADMIN — FUROSEMIDE 40 MG: 10 INJECTION, SOLUTION INTRAMUSCULAR; INTRAVENOUS at 10:02

## 2025-02-24 RX ADMIN — MAGNESIUM SULFATE IN DEXTROSE 1 G: 10 INJECTION, SOLUTION INTRAVENOUS at 10:02

## 2025-02-24 NOTE — FIRST PROVIDER EVALUATION
Emergency Department TeleTriage Encounter Note      CHIEF COMPLAINT    Chief Complaint   Patient presents with    Shortness of Breath     For several days    Leg Pain       VITAL SIGNS   Initial Vitals [02/24/25 1720]   BP Pulse Resp Temp SpO2   127/81 80 16 98.2 °F (36.8 °C) 97 %      MAP       --            ALLERGIES    Review of patient's allergies indicates:  No Known Allergies    PROVIDER TRIAGE NOTE  SOB with exertion that began this morning upon waking. Denies any chest pain. States he also has pain behind both knees and thighs that began last week. Denies swelling in the lower extremities.     Limited physical exam via telehealth: The patient is awake, alert, answering questions appropriately and is not in respiratory distress.  As the Teletriage provider, I performed an initial assessment and ordered appropriate labs and imaging studies, if any, to facilitate the patient's care once placed in the ED. Once a room is available, care and a full evaluation will be completed by an alternate ED provider.  Any additional orders and the final disposition will be determined by that provider.  All imaging and labs will not be followed-up by the Teletriage Team, including myself.          ORDERS  Labs Reviewed - No data to display    ED Orders (720h ago, onward)      Start Ordered     Status Ordering Provider    02/24/25 1925 02/24/25 1724  Troponin I High Sensitivity #2  Once Timed         Ordered STEPHENDARCARMEN.    02/24/25 1725 02/24/25 1724  Vital signs  Every 15 min         Ordered STEPHENDARBROOKLYNNA A.    02/24/25 1725 02/24/25 1724  Cardiac Monitoring - Adult  Continuous        Comments: Notify Physician If:    Ordered STEPHENDARCARMEN A.    02/24/25 1725 02/24/25 1724  Pulse Oximetry Continuous  Continuous         Ordered STEPHENDARNIRAJCARMEN A.    02/24/25 1725 02/24/25 1724  Diet NPO  Diet effective now         Ordered STEPHENDARBROOKLYNNA AAdalgisa    02/24/25 1725 02/24/25 1724  Saline lock IV  Once         Ordered STEPHENDAR,  CARMEN RIZVI    02/24/25 1725 02/24/25 1724  EKG 12-lead  Once        Comments: Do not perform if previously done during this visit/ triage    Ordered CARMEN CHEATHAM    02/24/25 1725 02/24/25 1724  CBC auto differential  STAT         Ordered STEPHENDACARMEN DENNEY    02/24/25 1725 02/24/25 1724  Comprehensive metabolic panel  STAT         Ordered CARMEN CHEATHAM    02/24/25 1725 02/24/25 1724  Troponin I High Sensitivity #1  STAT         Ordered CARMEN CHEATHAM    02/24/25 1725 02/24/25 1724  B-Type natriuretic peptide (BNP)  STAT         Ordered CARMEN CHEATHAM    02/24/25 1725 02/24/25 1724  X-Ray Chest PA And Lateral  1 time imaging         Ordered CARMEN CHEATHAM              Virtual Visit Note: The provider triage portion of this emergency department evaluation and documentation was performed via [employer], a HIPAA-compliant telemedicine application, in concert with a tele-presenter in the room. A face to face patient evaluation with one of my colleagues will occur once the patient is placed in an emergency department room.      DISCLAIMER: This note was prepared with [employer] voice recognition transcription software. Garbled syntax, mangled pronouns, and other bizarre constructions may be attributed to that software system.

## 2025-02-25 ENCOUNTER — CLINICAL SUPPORT (OUTPATIENT)
Dept: CARDIOLOGY | Facility: HOSPITAL | Age: 75
End: 2025-02-25
Attending: EMERGENCY MEDICINE
Payer: MEDICARE

## 2025-02-25 VITALS — WEIGHT: 139.13 LBS | HEIGHT: 68 IN | BODY MASS INDEX: 21.09 KG/M2

## 2025-02-25 LAB
ANION GAP SERPL CALC-SCNC: 7 MMOL/L (ref 8–16)
AORTIC ROOT ANNULUS: 3.7 CM
AORTIC VALVE CUSP SEPERATION: 2 CM
APICAL FOUR CHAMBER EJECTION FRACTION: 30 %
APICAL TWO CHAMBER EJECTION FRACTION: 22 %
ASCENDING AORTA: 3.6 CM
AV INDEX (PROSTH): 0.52
AV MEAN GRADIENT: 5 MMHG
AV PEAK GRADIENT: 9 MMHG
AV REGURGITATION PRESSURE HALF TIME: 517 MS
AV VALVE AREA BY VELOCITY RATIO: 1.9 CM²
AV VALVE AREA: 2.2 CM²
AV VELOCITY RATIO: 0.47
BASOPHILS # BLD AUTO: 0.05 K/UL (ref 0–0.2)
BASOPHILS NFR BLD: 1.5 % (ref 0–1.9)
BSA FOR ECHO PROCEDURE: 1.74 M2
BUN SERPL-MCNC: 13 MG/DL (ref 8–23)
CALCIUM SERPL-MCNC: 8.4 MG/DL (ref 8.7–10.5)
CHLORIDE SERPL-SCNC: 102 MMOL/L (ref 95–110)
CO2 SERPL-SCNC: 31 MMOL/L (ref 23–29)
CREAT SERPL-MCNC: 1 MG/DL (ref 0.5–1.4)
CV ECHO LV RWT: 0.28 CM
DIFFERENTIAL METHOD BLD: ABNORMAL
DOP CALC AO PEAK VEL: 1.5 M/S
DOP CALC AO VTI: 27.6 CM
DOP CALC LVOT AREA: 4.2 CM2
DOP CALC LVOT DIAMETER: 2.3 CM
DOP CALC LVOT PEAK VEL: 0.7 M/S
DOP CALC LVOT STROKE VOLUME: 59.4 CM3
DOP CALC MV VTI: 16.4 CM
DOP CALCLVOT PEAK VEL VTI: 14.3 CM
E WAVE DECELERATION TIME: 194 MSEC
E/A RATIO: 1
E/E' RATIO: 9 M/S
ECHO LV POSTERIOR WALL: 0.9 CM (ref 0.6–1.1)
EOSINOPHIL # BLD AUTO: 0.1 K/UL (ref 0–0.5)
EOSINOPHIL NFR BLD: 2.1 % (ref 0–8)
ERYTHROCYTE [DISTWIDTH] IN BLOOD BY AUTOMATED COUNT: 16 % (ref 11.5–14.5)
EST. GFR  (NO RACE VARIABLE): >60 ML/MIN/1.73 M^2
FRACTIONAL SHORTENING: 6.2 % (ref 28–44)
GLUCOSE SERPL-MCNC: 95 MG/DL (ref 70–110)
HCT VFR BLD AUTO: 37.8 % (ref 40–54)
HGB BLD-MCNC: 11.6 G/DL (ref 14–18)
IMM GRANULOCYTES # BLD AUTO: 0.01 K/UL (ref 0–0.04)
IMM GRANULOCYTES NFR BLD AUTO: 0.3 % (ref 0–0.5)
INFLUENZA A, MOLECULAR: NEGATIVE
INFLUENZA B, MOLECULAR: NEGATIVE
INTERVENTRICULAR SEPTUM: 0.7 CM (ref 0.6–1.1)
IVC DIAMETER: 1.39 CM
LEFT ATRIUM AREA SYSTOLIC (APICAL 4 CHAMBER): 23 CM2
LEFT ATRIUM SIZE: 2.9 CM
LEFT INTERNAL DIMENSION IN SYSTOLE: 6.1 CM (ref 2.1–4)
LEFT VENTRICLE DIASTOLIC VOLUME INDEX: 125.14 ML/M2
LEFT VENTRICLE DIASTOLIC VOLUME: 219 ML
LEFT VENTRICLE END DIASTOLIC VOLUME APICAL 2 CHAMBER: 103 ML
LEFT VENTRICLE END DIASTOLIC VOLUME APICAL 4 CHAMBER: 159 ML
LEFT VENTRICLE END SYSTOLIC VOLUME APICAL 4 CHAMBER: 58.9 ML
LEFT VENTRICLE MASS INDEX: 122.4 G/M2
LEFT VENTRICLE SYSTOLIC VOLUME INDEX: 105.7 ML/M2
LEFT VENTRICLE SYSTOLIC VOLUME: 185 ML
LEFT VENTRICULAR INTERNAL DIMENSION IN DIASTOLE: 6.5 CM (ref 3.5–6)
LEFT VENTRICULAR MASS: 214.3 G
LV LATERAL E/E' RATIO: 8 M/S
LV SEPTAL E/E' RATIO: 10 M/S
LVED V (TEICH): 219 ML
LVES V (TEICH): 185 ML
LVOT MG: 1 MMHG
LVOT MV: 0.49 CM/S
LYMPHOCYTES # BLD AUTO: 0.6 K/UL (ref 1–4.8)
LYMPHOCYTES NFR BLD: 18.5 % (ref 18–48)
MAGNESIUM SERPL-MCNC: 2.2 MG/DL (ref 1.6–2.6)
MCH RBC QN AUTO: 24.6 PG (ref 27–31)
MCHC RBC AUTO-ENTMCNC: 30.7 G/DL (ref 32–36)
MCV RBC AUTO: 80 FL (ref 82–98)
MONOCYTES # BLD AUTO: 0.5 K/UL (ref 0.3–1)
MONOCYTES NFR BLD: 14.9 % (ref 4–15)
MV MEAN GRADIENT: 1 MMHG
MV PEAK A VEL: 0.4 M/S
MV PEAK E VEL: 0.4 M/S
MV PEAK GRADIENT: 2 MMHG
MV STENOSIS PRESSURE HALF TIME: 76 MS
MV VALVE AREA BY CONTINUITY EQUATION: 3.62 CM2
MV VALVE AREA P 1/2 METHOD: 2.89 CM2
NEUTROPHILS # BLD AUTO: 2.1 K/UL (ref 1.8–7.7)
NEUTROPHILS NFR BLD: 62.7 % (ref 38–73)
NRBC BLD-RTO: 0 /100 WBC
OHS CV RV/LV RATIO: 0.35 CM
OHS LV EJECTION FRACTION SIMPSONS BIPLANE MOD: 27 %
PHOSPHATE SERPL-MCNC: 2.3 MG/DL (ref 2.7–4.5)
PISA AR MAX VEL: 4.67 M/S
PISA TR MAX VEL: 1.8 M/S
PLATELET # BLD AUTO: 257 K/UL (ref 150–450)
PMV BLD AUTO: 11.4 FL (ref 9.2–12.9)
POTASSIUM SERPL-SCNC: 3.7 MMOL/L (ref 3.5–5.1)
PV MV: 0.47 M/S
PV PEAK GRADIENT: 2 MMHG
PV PEAK VELOCITY: 0.68 M/S
RA PRESSURE ESTIMATED: 3 MMHG
RBC # BLD AUTO: 4.71 M/UL (ref 4.6–6.2)
RIGHT ATRIUM VOLUME AREA LENGTH APICAL 4 CHAMBER: 37.8 ML
RIGHT VENTRICLE DIASTOLIC BASEL DIMENSION: 2.3 CM
RIGHT VENTRICULAR END-DIASTOLIC DIMENSION: 2.27 CM
RV TB RVSP: 5 MMHG
RV TISSUE DOPPLER FREE WALL SYSTOLIC VELOCITY 1 (APICAL 4 CHAMBER VIEW): 14.9 CM/S
SARS-COV-2 RDRP RESP QL NAA+PROBE: NEGATIVE
SINUS: 3.71 CM
SODIUM SERPL-SCNC: 140 MMOL/L (ref 136–145)
SPECIMEN SOURCE: NORMAL
STJ: 3 CM
TDI LATERAL: 0.05 M/S
TDI SEPTAL: 0.04 M/S
TDI: 0.05 M/S
TRICUSPID ANNULAR PLANE SYSTOLIC EXCURSION: 2.39 CM
TROPONIN I SERPL HS-MCNC: 36.9 PG/ML (ref 0–14.9)
TROPONIN I SERPL HS-MCNC: 40.3 PG/ML (ref 0–14.9)
TV REST PULMONARY ARTERY PRESSURE: 16 MMHG
WBC # BLD AUTO: 3.36 K/UL (ref 3.9–12.7)
Z-SCORE OF LEFT VENTRICULAR DIMENSION IN END DIASTOLE: 2.95
Z-SCORE OF LEFT VENTRICULAR DIMENSION IN END SYSTOLE: 5.62

## 2025-02-25 PROCEDURE — 99900031 HC PATIENT EDUCATION (STAT)

## 2025-02-25 PROCEDURE — 96376 TX/PRO/DX INJ SAME DRUG ADON: CPT

## 2025-02-25 PROCEDURE — 99900035 HC TECH TIME PER 15 MIN (STAT)

## 2025-02-25 PROCEDURE — 85025 COMPLETE CBC W/AUTO DIFF WBC: CPT

## 2025-02-25 PROCEDURE — 63600175 PHARM REV CODE 636 W HCPCS

## 2025-02-25 PROCEDURE — 84484 ASSAY OF TROPONIN QUANT: CPT

## 2025-02-25 PROCEDURE — 80048 BASIC METABOLIC PNL TOTAL CA: CPT

## 2025-02-25 PROCEDURE — 94761 N-INVAS EAR/PLS OXIMETRY MLT: CPT

## 2025-02-25 PROCEDURE — 87502 INFLUENZA DNA AMP PROBE: CPT

## 2025-02-25 PROCEDURE — 94799 UNLISTED PULMONARY SVC/PX: CPT

## 2025-02-25 PROCEDURE — 83735 ASSAY OF MAGNESIUM: CPT

## 2025-02-25 PROCEDURE — 93306 TTE W/DOPPLER COMPLETE: CPT

## 2025-02-25 PROCEDURE — 87635 SARS-COV-2 COVID-19 AMP PRB: CPT

## 2025-02-25 PROCEDURE — 93306 TTE W/DOPPLER COMPLETE: CPT | Mod: 26,,, | Performed by: INTERNAL MEDICINE

## 2025-02-25 PROCEDURE — 12000002 HC ACUTE/MED SURGE SEMI-PRIVATE ROOM

## 2025-02-25 PROCEDURE — 25000003 PHARM REV CODE 250

## 2025-02-25 PROCEDURE — 84100 ASSAY OF PHOSPHORUS: CPT

## 2025-02-25 PROCEDURE — 25000003 PHARM REV CODE 250: Performed by: INTERNAL MEDICINE

## 2025-02-25 PROCEDURE — 99223 1ST HOSP IP/OBS HIGH 75: CPT | Mod: 25,,, | Performed by: INTERNAL MEDICINE

## 2025-02-25 PROCEDURE — 36415 COLL VENOUS BLD VENIPUNCTURE: CPT

## 2025-02-25 RX ORDER — FUROSEMIDE 10 MG/ML
20 INJECTION INTRAMUSCULAR; INTRAVENOUS EVERY 12 HOURS
Status: DISCONTINUED | OUTPATIENT
Start: 2025-02-25 | End: 2025-02-26 | Stop reason: HOSPADM

## 2025-02-25 RX ADMIN — SACUBITRIL AND VALSARTAN 1 TABLET: 24; 26 TABLET, FILM COATED ORAL at 09:02

## 2025-02-25 RX ADMIN — SACUBITRIL AND VALSARTAN 1 TABLET: 24; 26 TABLET, FILM COATED ORAL at 08:02

## 2025-02-25 RX ADMIN — APIXABAN 5 MG: 5 TABLET, FILM COATED ORAL at 09:02

## 2025-02-25 RX ADMIN — Medication 9 MG: at 08:02

## 2025-02-25 RX ADMIN — AMIODARONE HYDROCHLORIDE 200 MG: 200 TABLET ORAL at 08:02

## 2025-02-25 RX ADMIN — SPIRONOLACTONE 25 MG: 25 TABLET ORAL at 08:02

## 2025-02-25 RX ADMIN — FUROSEMIDE 20 MG: 10 INJECTION, SOLUTION INTRAMUSCULAR; INTRAVENOUS at 09:02

## 2025-02-25 RX ADMIN — APIXABAN 5 MG: 5 TABLET, FILM COATED ORAL at 08:02

## 2025-02-25 RX ADMIN — METOPROLOL SUCCINATE 25 MG: 25 TABLET, FILM COATED, EXTENDED RELEASE ORAL at 08:02

## 2025-02-25 RX ADMIN — TAMSULOSIN HYDROCHLORIDE 0.4 MG: 0.4 CAPSULE ORAL at 09:02

## 2025-02-25 RX ADMIN — FINASTERIDE 5 MG: 5 TABLET, FILM COATED ORAL at 08:02

## 2025-02-25 RX ADMIN — PANTOPRAZOLE SODIUM 40 MG: 40 TABLET, DELAYED RELEASE ORAL at 05:02

## 2025-02-25 RX ADMIN — LEVOTHYROXINE SODIUM 125 MCG: 0.1 TABLET ORAL at 05:02

## 2025-02-25 RX ADMIN — FUROSEMIDE 20 MG: 10 INJECTION, SOLUTION INTRAMUSCULAR; INTRAVENOUS at 08:02

## 2025-02-25 RX ADMIN — OXYCODONE HYDROCHLORIDE AND ACETAMINOPHEN 500 MG: 500 TABLET ORAL at 08:02

## 2025-02-25 RX ADMIN — CLOPIDOGREL BISULFATE 75 MG: 75 TABLET, FILM COATED ORAL at 08:02

## 2025-02-25 RX ADMIN — ACETAMINOPHEN 650 MG: 325 TABLET ORAL at 08:02

## 2025-02-25 NOTE — ED PROVIDER NOTES
Encounter Date: 2/24/2025       History     Chief Complaint   Patient presents with    Shortness of Breath     For several days    Leg Pain     Trevor Madrid is a 75 y.o. male with hx of AFib, cardiomyopathy status post defibrillator, COPD, hypertension and sarcoidosis presenting to emergency department with shortness of breath and leg pain.  Patient states he has had bilateral leg pain for 2 weeks.  Was seen at outside hospital and given unknown medication with no change.  Additionally patient has had increasing shortness of breath over the past 2 days.  Endorses non-productive cough over this time with congestion. Has been out of his spironolactone for approximately 1 week. Denies fevers, CP, leg swelling or syncope.     Review of patient's allergies indicates:  No Known Allergies  Past Medical History:   Diagnosis Date    A-fib     AICD (automatic cardioverter/defibrillator) present     Arthritis     Atrial fibrillation     Cancer     Cardiomyopathy     COPD (chronic obstructive pulmonary disease)     Heart failure     Histoplasmosis     Hypertension     Sarcoidosis     Sleep apnea     Thyroid disease      Past Surgical History:   Procedure Laterality Date    CARDIAC CATHETERIZATION      CARDIAC DEFIBRILLATOR PLACEMENT Left 01/2020    CORONARY ANGIOGRAPHY Left 9/16/2019    Procedure: CATHETERIZATION, HEART, LEFT (RIGHT RADIAL);  Surgeon: Gerald Romero MD;  Location: [facility] CATH/EP LAB;  Service: Cardiology;  Laterality: Left;    CORONARY STENT PLACEMENT      INSERTION OF BIVENTRICULAR IMPLANTABLE CARDIOVERTER-DEFIBRILLATOR (ICD)      TOTAL THYROIDECTOMY       Family History   Problem Relation Name Age of Onset    Hypertension Father       Social History[1]  Review of Systems  As per HPI   Physical Exam     Initial Vitals [02/24/25 1720]   BP Pulse Resp Temp SpO2   127/81 80 16 98.2 °F (36.8 °C) 97 %      MAP       --         Physical Exam    Nursing note and vitals reviewed.  Constitutional: He appears  well-developed and well-nourished.   HENT:   Head: Normocephalic and atraumatic.   Right Ear: External ear normal.   Left Ear: External ear normal.   Nose: Nose normal. Mouth/Throat: Oropharynx is clear and moist.   Eyes: Conjunctivae are normal.   Neck: Neck supple.   Cardiovascular:  Normal rate and regular rhythm.     Exam reveals no friction rub.       No murmur heard.  Pulmonary/Chest: No respiratory distress. He has rales (Fine rales throughout, greatest in the upper lobes).   Abdominal: Abdomen is soft. There is no abdominal tenderness.   Musculoskeletal:         General: No edema. Normal range of motion.      Cervical back: Neck supple.     Neurological: He is oriented to person, place, and time. GCS score is 15.   Skin: Skin is warm and dry. Capillary refill takes less than 2 seconds.   Psychiatric: He has a normal mood and affect. His behavior is normal. Thought content normal.         ED Course   Procedures  Labs Reviewed   CBC W/ AUTO DIFFERENTIAL - Abnormal       Result Value    WBC 4.08      RBC 4.50 (*)     Hemoglobin 11.0 (*)     Hematocrit 36.5 (*)     MCV 81 (*)     MCH 24.4 (*)     MCHC 30.1 (*)     RDW 15.9 (*)     Platelets 245      MPV 11.2      Immature Granulocytes 0.2      Gran # (ANC) 3.0      Immature Grans (Abs) 0.01      Lymph # 0.4 (*)     Mono # 0.5      Eos # 0.0      Baso # 0.05      nRBC 0      Gran % 74.1 (*)     Lymph % 10.3 (*)     Mono % 13.2      Eosinophil % 1.0      Basophil % 1.2      Differential Method Automated     COMPREHENSIVE METABOLIC PANEL - Abnormal    Sodium 140      Potassium 3.4 (*)     Chloride 105      CO2 26      Glucose 102      BUN 11      Creatinine 0.9      Calcium 8.3 (*)     Total Protein 6.8      Albumin 3.5      Total Bilirubin 0.8      Alkaline Phosphatase 66      AST 17      ALT 11      eGFR >60.0      Anion Gap 9     TROPONIN I HIGH SENSITIVITY - Abnormal    Troponin I High Sensitivity 37.6 (*)    B-TYPE NATRIURETIC PEPTIDE - Abnormal    BNP 1,062  (*)    TROPONIN I HIGH SENSITIVITY - Abnormal    Troponin I High Sensitivity 41.6 (*)    CK - Abnormal     (*)    CK   SARS-COV-2 RNA AMPLIFICATION, QUAL   POCT INFLUENZA A/B MOLECULAR          Imaging Results              X-Ray Chest PA And Lateral (Final result)  Result time 02/24/25 19:06:48      Final result by Jaswinder Navarro MD (02/24/25 19:06:48)                   Impression:      Stable abnormal chest radiograph as above.      Electronically signed by: Jaswinder Navarro MD  Date:    02/24/2025  Time:    19:06               Narrative:    EXAMINATION:  XR CHEST PA AND LATERAL    CLINICAL HISTORY:  Chest Pain;    TECHNIQUE:  PA and lateral views of the chest were performed.    COMPARISON:  05/08/2024    FINDINGS:  There is a left chest wall cardiac pacing device present.  Stable size and configuration of the cardiomediastinal silhouette.  There is aortic atherosclerosis.  Lungs demonstrate bilateral coarse interstitial attenuation with chronic multifocal upper lung zone airspace opacities, relatively stable compared to prior study of 05/08/2024. No new large confluent airspace consolidation appreciated.  No significant volume of pleural fluid or definitive pneumothorax identified.  Osseous structures demonstrate mild degenerative changes.                                       Medications   magnesium sulfate in dextrose IVPB (premix) 1 g (1 g Intravenous New Bag 2/24/25 2248)   potassium bicarbonate disintegrating tablet 50 mEq (has no administration in time range)   furosemide injection 40 mg (40 mg Intravenous Given 2/24/25 2247)     Medical Decision Making  75 y.o. male presenting to the ED for shortness of breath and leg pain. Vitals on arrival within normal limits. Physical exam pertinent for general well appearance.  No respiratory distress while sitting.  Lungs with fine rales throughout.  Abdomen is soft and nondistended.  No lower extremity edema.  Full range of motion in bilateral lower  extremities.  Cap refill less than 2 seconds distally.  Sensation equal.  Initial differential includes but is not limited to:  Heart failure exacerbation, ACS, arrhythmia, electrolyte abnormality, anemia, myositis.  Given clinical exam low concern for obstructive lung pathology.     Patient given magnesium, potassium and Lasix on arrival.     Amount and/or Complexity of Data Reviewed  External Data Reviewed: labs, radiology, ECG and notes.     Details: Reviewed per ED course and below  Labs: ordered. Decision-making details documented in ED Course.  Radiology: ordered and independent interpretation performed.     Details: Redemonstrated apical opacities, unchanged from previous.  Diffuse coarse interstitial markings, unchanged from previous.  No other acute findings.  ECG/medicine tests: ordered and independent interpretation performed.     Details: Atrial paced rhythm, rate of 80 with normal axis.  Prolonged MS and QTC intervals, normal QRS.  No ST elevation or depression.  T-wave inversions and non territorial leads, unchanged from prior.  Discussion of management or test interpretation with external provider(s): Discussed HPI, exam and workup findings with hospitalist.    Risk  OTC drugs.  Prescription drug management.  Decision regarding hospitalization.  Risk Details: Patient given potassium, Mag and Lasix on arrival.  Given dyspnea on exertion, elevated troponin and prolonged QTC patient admitted to the hospital for observation and treatment.               ED Course as of 02/24/25 2259 Mon Feb 24, 2025 2154 Troponin I High Sensitivity(!): 41.6  Increased by 4 from initial measurement.  Stable.  Chronically elevated. [SM]   2154 Hemoglobin(!): 11.0  History of microcytic anemia, similar to last several measured. [SM]   2154 WBC: 4.08 [SM]   2154 Platelet Count: 245 [SM]   2155 BNP(!): 1,062  Has been elevated to similar levels in the past during exacerbations. []   2252 CPK(!): 260  Elevated for unclear  reason we will likely need to trend in the setting of diffuse muscular pain in bilateral lower extremities. [SM]      ED Course User Index  [SM] Inge Suero MD                           Clinical Impression:  Final diagnoses:  [R06.02] SOB (shortness of breath) (Primary)  [M79.604, M79.605] Bilateral leg pain  [R94.31] Prolonged Q-T interval on ECG          ED Disposition Condition    Admit Stable                    [1]   Social History  Tobacco Use    Smoking status: Former     Current packs/day: 0.00     Types: Cigarettes     Quit date: 9/12/2014     Years since quitting: 10.4    Smokeless tobacco: Former   Substance Use Topics    Alcohol use: Not Currently    Drug use: Never        Inge Suero MD  Resident  02/24/25 9173     treatment.              Attending Attestation:   Physician Attestation Statement for Resident:  As the supervising MD   Physician Attestation Statement: I have personally seen and examined this patient.   I agree with the above history.  -:   As the supervising MD I agree with the above PE.     As the supervising MD I agree with the above treatment, course, plan, and disposition.    I have reviewed and agree with the residents interpretation of the following: lab data, x-rays, CT scans and EKG.                 ED Course as of 03/17/25 2215 Mon Feb 24, 2025 2154 Troponin I High Sensitivity(!): 41.6  Increased by 4 from initial measurement.  Stable.  Chronically elevated. []   2154 Hemoglobin(!): 11.0  History of microcytic anemia, similar to last several measured. []   2154 WBC: 4.08 []   2154 Platelet Count: 245 []   2155 BNP(!): 1,062  Has been elevated to similar levels in the past during exacerbations. []   2252 CPK(!): 260  Elevated for unclear reason we will likely need to trend in the setting of diffuse muscular pain in bilateral lower extremities. []      ED Course User Index  [SM] Inge Suero MD                           Clinical Impression:  Final diagnoses:  [R06.02] SOB (shortness of breath) (Primary)  [M79.604, M79.605] Bilateral leg pain  [R94.31] Prolonged Q-T interval on ECG  [I50.43] Acute on chronic combined systolic (congestive) and diastolic (congestive) heart failure          ED Disposition Condition    Observation                   Inge Suero MD  Resident  02/24/25 2300         [1]   Social History  Tobacco Use    Smoking status: Former     Current packs/day: 0.00     Types: Cigarettes     Quit date: 9/12/2014     Years since quitting: 10.5    Smokeless tobacco: Former   Substance Use Topics    Alcohol use: Not Currently    Drug use: Never        Honey Underwood MD  03/17/25 2215

## 2025-02-25 NOTE — ASSESSMENT & PLAN NOTE
Patient has Combined Systolic and Diastolic heart failure that is Acute on chronic. On presentation their CHF was decompensated. Evidence of decompensated CHF on presentation includes: crackles on lung auscultation, orthopnea, dyspnea on exertion (NIEVES), and shortness of breath. Most recent BNP and echo results are listed below.  Recent Labs     02/24/25  1804   BNP 1,062*     Latest ECHO  Results for orders placed during the hospital encounter of 05/07/24    Echo    Interpretation Summary    Left Ventricle: The left ventricle is mildly dilated. Mildly increased wall thickness. Severe global hypokinesis present. There is severely reduced systolic function with a visually estimated ejection fraction of 15 - 20%. There is diastolic dysfunction but grade cannot be determined.    Right Ventricle: Normal right ventricular cavity size. Systolic function is normal.    Left Atrium: Left atrium is moderately dilated.    Right Atrium: Right atrium is mildly dilated.    Aortic Valve: . There is severe aortic regurgitation.    Mitral Valve:  There is mild regurgitation.    Pulmonic Valve: There is moderate regurgitation.    IVC/SVC: Intermediate venous pressure at 8 mmHg.    Current Heart Failure Medications  sacubitriL-valsartan 24-26 mg per tablet 1 tablet, 2 times daily, Oral  metoprolol succinate (TOPROL-XL) 24 hr tablet 25 mg, Daily, Oral  spironolactone tablet 25 mg, Daily, Oral  furosemide injection 20 mg, Every 12 hours, Intravenous    Plan  - Monitor strict I&Os and daily weights.    - Place on telemetry  - Low sodium diet  - Place on fluid restriction of 1.5 L.   - Cardiology has not been consulted  - The patient's volume status is stable but not at their baseline as indicated by crackles on lung auscultation, orthopnea, dyspnea on exertion (NIEVES), and shortness of breath  - Lasix 20 mg IB BID  - Continue home Entresto, metoprolol, spironolactone  - TTE pending

## 2025-02-25 NOTE — CARE UPDATE
Patient seen and examined.  Feeling better this morning.  Endorses being out of the spironalactone prior to admission. Otherwise endorses compliance with medication routine and feels pretty compliant with low salt diet, though does have fast food at bedside. He does not do daily weights. I educated him on the importance.    His EF on updated ECHO is improving to 25-30% (about 10% from prior).    Attending nocturnist MD consulted cardiology- we will follow up their recs.  Hopefully can transition to PO lasix in next day or so and transition to home.

## 2025-02-25 NOTE — CONSULTS
"[facility]  Adult Nutrition   Consult Note (Nutrition Education)     SUMMARY     Recommendations  1) Continue current Low Sodium, 2gm Fluid - 1500mL diet as tolerated.   2) RD to provide diet education for Low Sodium/FR diet.   3) RD has added Boost to pts meal tray daily to assist in meeting his EEN/EPN.    Nutrition Goal Status: new  Communication of RD Recs: other (comment)    Nutrition Goals: Patient will verbalize understanding of diet principles prior to discharge.    Nutrition Interventions: Fluid modified diet and Sodium modified diet    Nutrition Diagnosis PES Statement: Food- and nutrition-related knowledge deficit related to use for new education as evidenced by a BNP of 1062.    Nutrition Diagnosis Status:   New    Dietitian Rounds Brief  Consult for Diet Education: Pt is a 75 yobm admitted with Acute on chronic combined systolic (congestive) and diastolic (congestive) heart failure and a pmh of HTN, afib, thyroid disease, COPD and sarcoidosis. His LBM was today and his skin is intact. RD has provided diet education and an ONS. Will continue to follow.    Nutrition Related Social Determinants of Health:   Food Insecurity: No Food Insecurity (6/7/2024)    Hunger Vital Sign     Worried About Running Out of Food in the Last Year: Never true     Ran Out of Food in the Last Year: Never true         Malnutrition Assessment  None noted.       Diet order:   Current Diet Order: Low Sodium, 2gm Fluid - 1500mL         Evaluation of Received Nutrient/Fluid Intake  Energy Calories Required: not meeting needs  Protein Required: not meeting needs  Fluid Required: meeting needs  Tolerance: other (see comments)     % Intake of Estimated Energy Needs: 25 - 50 %  % Meal Intake: 0 - 25 %      Intake/Output Summary (Last 24 hours) at 2/25/2025 1152  Last data filed at 2/25/2025 1139  Gross per 24 hour   Intake 510 ml   Output 2200 ml   Net -1690 ml        Anthropometrics  Height: 5' 8" (172.7 cm)  Height " (inches): 68 in  Height Method: Stated  Weight: 63.1 kg (139 lb 3.2 oz)  Weight (lb): 139.2 lb  Weight Method: Bed Scale  Ideal Body Weight (IBW), Male: 154 lb  % Ideal Body Weight, Male (lb): 90.39 %  BMI (Calculated): 21.2  BMI Grade: 18.5-24.9 - normal       Estimated/Assessed Needs  Weight Used For Calorie Calculations: 63.1 kg (139 lb 1.8 oz)  Energy Calorie Requirements (kcal): 9973-4162 kcals/day (25-30 kcals/kg ABW)  Energy Need Method: Kcal/kg  Protein Requirements: 50-63 g/day (0.8-1.0 g/kg ABW)  Weight Used For Protein Calculations: 63.1 kg (139 lb 1.8 oz)     Estimated Fluid Requirement Method: RDA Method  RDA Method (mL): 1578       Reason for Assessment  Reason For Assessment: consult, other (see comments) (CHF)  Diagnosis: cardiac disease  General Information Comments: Low Sodium, 2gm Fluid - 1500mL    Final diagnoses:  [R06.02] SOB (shortness of breath) (Primary)  [M79.604, M79.605] Bilateral leg pain  [R94.31] Prolonged Q-T interval on ECG  [I50.43] Acute on chronic combined systolic (congestive) and diastolic (congestive) heart failure     Past Medical History:   Diagnosis Date    A-fib     AICD (automatic cardioverter/defibrillator) present     Arthritis     Atrial fibrillation     Cancer     Cardiomyopathy     COPD (chronic obstructive pulmonary disease)     Heart failure     Histoplasmosis     Hypertension     Sarcoidosis     Sleep apnea     Thyroid disease         Nutrition/Diet History  Spiritual, Cultural Beliefs, Latter-day Practices, Values that Affect Care: no  Food Allergies: NKFA  Factors Affecting Nutritional Intake: other (see comments) (SOB)    Nutrition Risk Screen                Weight History:  Wt Readings from Last 10 Encounters:   02/25/25 63.1 kg (139 lb 3.2 oz)   02/25/25 63.1 kg (139 lb 1.8 oz)   01/06/25 65.3 kg (144 lb)   09/25/24 64.4 kg (142 lb)   08/06/24 64.9 kg (143 lb)   06/06/24 63.9 kg (140 lb 14 oz)   05/20/24 64.4 kg (141 lb 13.9 oz)   05/07/24 63.5 kg (140 lb 0.1  oz)   03/28/23 67.1 kg (148 lb)   12/08/22 68 kg (150 lb)        Lab/Procedures/Meds: Pertinent Labs/Meds Reviewed    Medications:Pertinent Medications Reviewed  Scheduled Meds:   amiodarone  200 mg Oral Daily    apixaban  5 mg Oral BID    ascorbic acid (vitamin C)  500 mg Oral Daily    finasteride  5 mg Oral Daily    furosemide (LASIX) injection  20 mg Intravenous Q12H    levothyroxine  125 mcg Oral Before breakfast    metoprolol succinate  25 mg Oral Daily    pantoprazole  40 mg Oral Daily    sacubitriL-valsartan  1 tablet Oral BID    spironolactone  25 mg Oral Daily    tamsulosin  1 capsule Oral QHS     Continuous Infusions:  PRN Meds:.  Current Facility-Administered Medications:     acetaminophen, 650 mg, Oral, Q4H PRN    albuterol-ipratropium, 3 mL, Nebulization, Q6H PRN    aluminum-magnesium hydroxide-simethicone, 30 mL, Oral, QID PRN    dextrose 50%, 12.5 g, Intravenous, PRN    dextrose 50%, 25 g, Intravenous, PRN    glucagon (human recombinant), 1 mg, Intramuscular, PRN    glucose, 16 g, Oral, PRN    glucose, 24 g, Oral, PRN    magnesium oxide, 800 mg, Oral, PRN    magnesium oxide, 800 mg, Oral, PRN    melatonin, 9 mg, Oral, Nightly PRN    naloxone, 0.02 mg, Intravenous, PRN    ondansetron, 4 mg, Intravenous, Q6H PRN    potassium bicarbonate, 35 mEq, Oral, PRN    potassium bicarbonate, 50 mEq, Oral, PRN    potassium bicarbonate, 60 mEq, Oral, PRN    potassium, sodium phosphates, 2 packet, Oral, PRN    potassium, sodium phosphates, 2 packet, Oral, PRN    potassium, sodium phosphates, 2 packet, Oral, PRN    senna-docusate 8.6-50 mg, 1 tablet, Oral, BID PRN    sodium chloride 0.9%, 10 mL, Intravenous, PRN    Labs: Pertinent Labs Reviewed  Clinical Chemistry:  Recent Labs   Lab 02/24/25  1804 02/25/25  0541    140   K 3.4* 3.7    102   CO2 26 31*    95   BUN 11 13   CREATININE 0.9 1.0   CALCIUM 8.3* 8.4*   PROT 6.8  --    ALBUMIN 3.5  --    BILITOT 0.8  --    ALKPHOS 66  --    AST 17  --     ALT 11  --    ANIONGAP 9 7*   MG  --  2.2   PHOS  --  2.3*     CBC:   Recent Labs   Lab 02/25/25  0541   WBC 3.36*   RBC 4.71   HGB 11.6*   HCT 37.8*      MCV 80*   MCH 24.6*   MCHC 30.7*       Cardiac Profile:  Recent Labs   Lab 02/24/25  1804   BNP 1,062*   *       Monitor and Evaluation  Monitor and Evaluation: Food and beverage intake, Energy intake, Food and nutrition knowledge, Weight     Discharge Planning  Nutrition Discharge Planning: Therapeutic diet (comments)  Therapeutic diet (comments): Low Sodium, 2gm Fluid - 1500mL    Nutrition Risk  Level of Risk/Frequency of Follow-up: high     Nutrition Follow-Up  RD Follow-up?: Yes

## 2025-02-25 NOTE — SUBJECTIVE & OBJECTIVE
Past Medical History:   Diagnosis Date    A-fib     AICD (automatic cardioverter/defibrillator) present     Arthritis     Atrial fibrillation     Cancer     Cardiomyopathy     COPD (chronic obstructive pulmonary disease)     Heart failure     Histoplasmosis     Hypertension     Sarcoidosis     Sleep apnea     Thyroid disease        Past Surgical History:   Procedure Laterality Date    CARDIAC CATHETERIZATION      CARDIAC DEFIBRILLATOR PLACEMENT Left 01/2020    CORONARY ANGIOGRAPHY Left 9/16/2019    Procedure: CATHETERIZATION, HEART, LEFT (RIGHT RADIAL);  Surgeon: Gerald Romero MD;  Location: [facility] CATH/EP LAB;  Service: Cardiology;  Laterality: Left;    CORONARY STENT PLACEMENT      INSERTION OF BIVENTRICULAR IMPLANTABLE CARDIOVERTER-DEFIBRILLATOR (ICD)      TOTAL THYROIDECTOMY         Review of patient's allergies indicates:  No Known Allergies    No current facility-administered medications on file prior to encounter.     Current Outpatient Medications on File Prior to Encounter   Medication Sig    acetaminophen (TYLENOL) 500 MG tablet Take 500 mg by mouth every 6 (six) hours as needed for Pain.    amiodarone (PACERONE) 200 MG Tab Take 1 tablet (200 mg total) by mouth once daily.    apixaban (ELIQUIS) 5 mg Tab Take 5 mg by mouth 2 (two) times daily.    ascorbic acid, vitamin C, (VITAMIN C) 500 MG tablet Take 500 mg by mouth once daily.    clopidogreL (PLAVIX) 75 mg tablet Take 1 tablet (75 mg total) by mouth once daily.    ENTRESTO 24-26 mg per tablet TAKE 1 TABLET BY MOUTH TWICE DAILY    finasteride (PROSCAR) 5 mg tablet Take 1 tablet (5 mg total) by mouth once daily.    furosemide (LASIX) 20 MG tablet Take 1 tablet (20 mg total) by mouth once daily.    levothyroxine (SYNTHROID) 125 MCG tablet Take 125 mcg by mouth once daily.    metoprolol succinate (TOPROL-XL) 25 MG 24 hr tablet Take 1 tablet (25 mg total) by mouth once daily.    nitroGLYCERIN (NITROSTAT) 0.4 MG SL tablet Place 0.4 mg under the tongue  every 5 (five) minutes as needed for Chest pain.    pantoprazole (PROTONIX) 40 MG tablet Take 40 mg by mouth once daily.    spironolactone (ALDACTONE) 25 MG tablet Take 1 tablet (25 mg total) by mouth once daily.    tamsulosin (FLOMAX) 0.4 mg Cap Take 1 capsule by mouth every evening.    azelastine (ASTELIN) 137 mcg (0.1 %) nasal spray 2 sprays by Nasal route 2 (two) times daily.     Family History       Problem Relation (Age of Onset)    Hypertension Father          Tobacco Use    Smoking status: Former     Current packs/day: 0.00     Types: Cigarettes     Quit date: 9/12/2014     Years since quitting: 10.4    Smokeless tobacco: Former   Substance and Sexual Activity    Alcohol use: Not Currently    Drug use: Never    Sexual activity: Not on file     Review of Systems   Constitutional:  Negative for chills and fever.   HENT:  Positive for congestion and rhinorrhea.    Respiratory:  Positive for cough and shortness of breath.    Cardiovascular:  Negative for chest pain.   Gastrointestinal:  Negative for abdominal pain, diarrhea, nausea and vomiting.   Genitourinary:  Negative for dysuria and hematuria.   Neurological:  Negative for dizziness, syncope and light-headedness.     Objective:     Vital Signs (Most Recent):  Temp: 98.2 °F (36.8 °C) (02/24/25 1720)  Pulse: 80 (02/24/25 1720)  Resp: 16 (02/24/25 1720)  BP: 127/81 (02/24/25 1720)  SpO2: 97 % (02/24/25 1720) Vital Signs (24h Range):  Temp:  [98.2 °F (36.8 °C)] 98.2 °F (36.8 °C)  Pulse:  [80] 80  Resp:  [16] 16  SpO2:  [97 %] 97 %  BP: (127)/(81) 127/81     Weight: 63 kg (139 lb)  Body mass index is 21.13 kg/m².     Physical Exam  Vitals reviewed.   Constitutional:       General: He is awake. He is not in acute distress.     Appearance: Normal appearance. He is not ill-appearing or diaphoretic.   Cardiovascular:      Rate and Rhythm: Normal rate.      Heart sounds: Murmur heard.      No friction rub. No gallop.      Comments: Trace BLE edema  Pulmonary:       Effort: Pulmonary effort is normal. No accessory muscle usage or respiratory distress.      Breath sounds: Rales (Faint bibasilar as well as in upper lobes) present. No wheezing or rhonchi.   Abdominal:      General: Abdomen is flat. Bowel sounds are normal.      Tenderness: There is no abdominal tenderness. There is no guarding or rebound.   Skin:     General: Skin is warm and dry.   Neurological:      Mental Status: He is alert and oriented to person, place, and time.   Psychiatric:         Behavior: Behavior is cooperative.                Significant Labs: All pertinent labs within the past 24 hours have been reviewed.  CBC:   Recent Labs   Lab 02/24/25 1804   WBC 4.08   HGB 11.0*   HCT 36.5*        CMP:   Recent Labs   Lab 02/24/25 1804      K 3.4*      CO2 26      BUN 11   CREATININE 0.9   CALCIUM 8.3*   PROT 6.8   ALBUMIN 3.5   BILITOT 0.8   ALKPHOS 66   AST 17   ALT 11   ANIONGAP 9     Cardiac Markers:   Recent Labs   Lab 02/24/25 1804   BNP 1,062*     Troponin:   Recent Labs   Lab 02/24/25 1804 02/24/25 1959   TROPONINIHS 37.6* 41.6*       Significant Imaging:   Imaging Results              US Lower Extremity Veins Bilateral (Final result)  Result time 02/25/25 00:05:09      Final result by Jaswinder Navarro MD (02/25/25 00:05:09)                   Impression:      No evidence of deep venous thrombosis in either lower extremity.      Electronically signed by: Jaswinder Navarro MD  Date:    02/25/2025  Time:    00:05               Narrative:    EXAMINATION:  US LOWER EXTREMITY VEINS BILATERAL    CLINICAL HISTORY:  bilateral leg pain;    TECHNIQUE:  Duplex and color flow Doppler and dynamic compression was performed of the bilateral lower extremity veins was performed.    COMPARISON:  05/07/2024    FINDINGS:  Right thigh veins: The common femoral, femoral, popliteal, upper greater saphenous, and deep femoral veins are patent and free of thrombus. The veins are normally  compressible and have normal phasic flow and augmentation response.    Right calf veins: The visualized calf veins are patent.    Left thigh veins: The common femoral, femoral, popliteal, upper greater saphenous, and deep femoral veins are patent and free of thrombus. The veins are normally compressible and have normal phasic flow and augmentation response.    Left calf veins: The visualized calf veins are patent.    Miscellaneous: None                                       X-Ray Chest PA And Lateral (Final result)  Result time 02/24/25 19:06:48      Final result by Jaswinder Navarro MD (02/24/25 19:06:48)                   Impression:      Stable abnormal chest radiograph as above.      Electronically signed by: Jaswinder Navarro MD  Date:    02/24/2025  Time:    19:06               Narrative:    EXAMINATION:  XR CHEST PA AND LATERAL    CLINICAL HISTORY:  Chest Pain;    TECHNIQUE:  PA and lateral views of the chest were performed.    COMPARISON:  05/08/2024    FINDINGS:  There is a left chest wall cardiac pacing device present.  Stable size and configuration of the cardiomediastinal silhouette.  There is aortic atherosclerosis.  Lungs demonstrate bilateral coarse interstitial attenuation with chronic multifocal upper lung zone airspace opacities, relatively stable compared to prior study of 05/08/2024. No new large confluent airspace consolidation appreciated.  No significant volume of pleural fluid or definitive pneumothorax identified.  Osseous structures demonstrate mild degenerative changes.

## 2025-02-25 NOTE — PLAN OF CARE
Problem: Skin Injury Risk Increased  Goal: Skin Health and Integrity  Intervention: Promote and Optimize Oral Intake  Flowsheets (Taken 2/25/2025 1157)  Oral Nutrition Promotion: calorie-dense liquids provided     Problem: Oral Intake Inadequate  Goal: Improved Oral Intake  Outcome: Progressing  Intervention: Promote and Optimize Oral Intake  Flowsheets (Taken 2/25/2025 1157)  Oral Nutrition Promotion: calorie-dense liquids provided

## 2025-02-25 NOTE — ASSESSMENT & PLAN NOTE
Patient's COPD is controlled currently.  Patient is currently off COPD Pathway. Continue scheduled inhalers Supplemental oxygen and monitor respiratory status closely.     - DuoNebs Q6H PRN

## 2025-02-25 NOTE — ASSESSMENT & PLAN NOTE
Patient with known CAD s/p stent placement, which is controlled Will continue Eliquis, Plavix and Statin and monitor for S/Sx of angina/ACS. Continue to monitor on telemetry.

## 2025-02-25 NOTE — PLAN OF CARE
[facility]  Initial Discharge Assessment       Primary Care Provider: Martín Tello NP    Admission Diagnosis: Acute on chronic combined systolic (congestive) and diastolic (congestive) heart failure [I50.43]    Admission Date: 2/24/2025  Expected Discharge Date: 2/27/2025    Transition of Care Barriers: None    Payor: [employer] MANAGED MEDICARE / Plan: [employer] HMO PPO SPECIAL NEEDS / Product Type: Medicare Advantage /     Extended Emergency Contact Information  Primary Emergency Contact: Kate iMtchell  Mobile Phone: [phone]  Relation: Other  Preferred language: English   needed? No  Secondary Emergency Contact: Josy Blake  Mobile Phone: [phone]  Relation: Relative  Preferred language: English   needed? No    Discharge Plan A: Home  Discharge Plan B: Home with family      [id number] - [place], [place] - [address]  [address]  [place] [address]  Phone: [phone] Fax: [phone]    [facility] - [facility] [address]  [address] [facility] [address]  Phone: [phone] Fax: [phone]     met with patient at bedside to complete discharge planning assessment.  Patient alert and oriented xs 4.  Patient verified all demographic information on facesheet is correct.  Patient verified PCP is MINI Tello.  Patient verified primary health insurance is [employer] and [place] Medicaid.  Patient with NO home health but has listed DME.  Patient with NO POA or Living Will.  Patient not on dialysis or medication coumadin.  Patient with no 30 day admission.  Patient with no financial issues at this time.  Patient family will provide transportation upon discharge from facility.  Patient independent with ADLs, live alone, drives self.      Initial Assessment (most recent)       Adult Discharge Assessment - 02/25/25 1340          Discharge Assessment    Assessment Type Discharge Planning Assessment      Confirmed/corrected address, phone number and insurance Yes     Confirmed Demographics Correct on Facesheet     Source of Information patient     Does patient/caregiver understand observation status Yes     Communicated ELISA with patient/caregiver Yes     People in Home alone     Facility Arrived From: home     Do you expect to return to your current living situation? Yes     Do you have help at home or someone to help you manage your care at home? Yes     Who are your caregiver(s) and their phone number(s)? family     Prior to hospitilization cognitive status: Alert/Oriented     Current cognitive status: Alert/Oriented     Walking or Climbing Stairs Difficulty yes     Walking or Climbing Stairs ambulation difficulty, requires equipment;stair climbing difficulty, requires equipment     Dressing/Bathing Difficulty yes     Dressing/Bathing bathing difficulty, requires equipment;dressing difficulty, requires equipment     Equipment Currently Used at Home cane, straight     Readmission within 30 days? No     Patient currently being followed by outpatient case management? No     Do you currently have service(s) that help you manage your care at home? No     Do you take prescription medications? Yes     Do you have prescription coverage? Yes     Do you have any problems affording any of your prescribed medications? No     Is the patient taking medications as prescribed? yes     Who is going to help you get home at discharge? family     How do you get to doctors appointments? car, drives self     Are you on dialysis? No     Do you take coumadin? No     Discharge Plan A Home     Discharge Plan B Home with family     DME Needed Upon Discharge  none     Discharge Plan discussed with: Patient     Transition of Care Barriers None

## 2025-02-25 NOTE — PROGRESS NOTES
[facility]  Adult Nutrition  Education Short Note      Nutrition Education    Previous education: no    Diet at home: Regular    Written information provided and explained on  Low Sodium/FR diet  cardiac diet, fluid restriction: 1500 mL, and 2 gram sodium diet diet.     Discussed with: patient    Educational Need? yes    Barriers: none identified    Interventions: Fluid modified diet and Sodium modified diet    Patient and/or family comprehend instructions: yes    Outcome: Verbalizes understanding     Thanks for the consult!    Dina Pena RD 02/25/2025 12:01 PM

## 2025-02-25 NOTE — ASSESSMENT & PLAN NOTE
Chronic, Latest blood pressure and vitals reviewed-     Temp:  [98.2 °F (36.8 °C)]   Pulse:  [80]   Resp:  [16]   BP: (127)/(81)   SpO2:  [97 %] .   Home meds for hypertension were reviewed and noted below-  Hypertension Medications              ENTRESTO 24-26 mg per tablet TAKE 1 TABLET BY MOUTH TWICE DAILY    furosemide (LASIX) 20 MG tablet Take 1 tablet (20 mg total) by mouth once daily.    metoprolol succinate (TOPROL-XL) 25 MG 24 hr tablet Take 1 tablet (25 mg total) by mouth once daily.    nitroGLYCERIN (NITROSTAT) 0.4 MG SL tablet Place 0.4 mg under the tongue every 5 (five) minutes as needed for Chest pain.    spironolactone (ALDACTONE) 25 MG tablet Take 1 tablet (25 mg total) by mouth once daily.            While in the hospital, will manage blood pressure as follows; Continue home antihypertensive regimen    Will utilize p.r.n. blood pressure medication only if patient's blood pressure greater than 180/110 and he develops symptoms such as worsening chest pain or shortness of breath.

## 2025-02-25 NOTE — CONSULTS
[facility]  Department of Cardiology  Consult Note      PATIENT NAME: Trevor Madrid  MRN: [id number]  TODAY'S DATE: 02/25/2025  ADMIT DATE: 2/24/2025                          CONSULT REQUESTED BY: Juan Ramirez MD    SUBJECTIVE     PRINCIPAL PROBLEM: Acute on chronic combined systolic (congestive) and diastolic (congestive) heart failure      REASON FOR CONSULT:  CHF      HPI:  Pt is 76 yo male with PMH: PAF, DCM, ICD in situ, CAD (Stents 2019), PAD (peripheral stents), HTN, hypothyroidism sarcoidosis, GERD who presented to ER yesterday with c/o shortness of breath starting this past Sunday or Monday. He states activity worsened the dyspnea. Also complains of leg pain and swelling. He is reportedly feeling better today. Reports compliance with medications.   Per hospitalist notes pt reported congestion, dry cough and rhinorrhea x 1 day      Review of patient's allergies indicates:  No Known Allergies    Past Medical History:   Diagnosis Date    A-fib     AICD (automatic cardioverter/defibrillator) present     Arthritis     Atrial fibrillation     Cancer     Cardiomyopathy     COPD (chronic obstructive pulmonary disease)     Heart failure     Histoplasmosis     Hypertension     Sarcoidosis     Sleep apnea     Thyroid disease      Past Surgical History:   Procedure Laterality Date    CARDIAC CATHETERIZATION      CARDIAC DEFIBRILLATOR PLACEMENT Left 01/2020    CORONARY ANGIOGRAPHY Left 9/16/2019    Procedure: CATHETERIZATION, HEART, LEFT (RIGHT RADIAL);  Surgeon: Gerald Romero MD;  Location: [facility] CATH/EP LAB;  Service: Cardiology;  Laterality: Left;    CORONARY STENT PLACEMENT      INSERTION OF BIVENTRICULAR IMPLANTABLE CARDIOVERTER-DEFIBRILLATOR (ICD)      TOTAL THYROIDECTOMY       Social History[1]     REVIEW OF SYSTEMS  Negative except as mentioned in HPI    OBJECTIVE     VITAL SIGNS (Most Recent)  Temp: 98 °F (36.7 °C) (02/25/25 1137)  Pulse: 70 (02/25/25 1137)  Resp: 18 (02/25/25 1137)  BP:  119/84 (02/25/25 1137)  SpO2: (!) 93 % (02/25/25 1137)    VENTILATION STATUS  Resp: 18 (02/25/25 1137)  SpO2: (!) 93 % (02/25/25 1137)           I & O (Last 24H):  Intake/Output Summary (Last 24 hours) at 2/25/2025 1309  Last data filed at 2/25/2025 1139  Gross per 24 hour   Intake 510 ml   Output 2200 ml   Net -1690 ml       WEIGHTS  Wt Readings from Last 3 Encounters:   02/25/25 0125 63.1 kg (139 lb 3.2 oz)   02/24/25 1720 63 kg (139 lb)   02/25/25 1012 63.1 kg (139 lb 1.8 oz)   01/06/25 0929 65.3 kg (144 lb)       PHYSICAL EXAM    GENERAL: Elderly male breathing comfortably in no apparent distress.  HEENT: Normocephalic.    NECK: No JVD.   CARDIAC: Regular rate and rhythm. S1 is normal.S2 is normal.+ murmur; Paced rhythm on telemetry  CHEST ANATOMY: normal.   LUNGS: Diminished breath sounds to bases   ABDOMEN: Soft .  Normal bowel sounds.    EXTREMITIES: No edema  CENTRAL NERVOUS SYSTEM: AAO x 3  SKIN: No rash     HOME MEDICATIONS:Medications Ordered Prior to Encounter[2]    SCHEDULED MEDS:   amiodarone  200 mg Oral Daily    apixaban  5 mg Oral BID    ascorbic acid (vitamin C)  500 mg Oral Daily    finasteride  5 mg Oral Daily    furosemide (LASIX) injection  20 mg Intravenous Q12H    levothyroxine  125 mcg Oral Before breakfast    metoprolol succinate  25 mg Oral Daily    pantoprazole  40 mg Oral Daily    sacubitriL-valsartan  1 tablet Oral BID    spironolactone  25 mg Oral Daily    tamsulosin  1 capsule Oral QHS       CONTINUOUS INFUSIONS:    PRN MEDS:  Current Facility-Administered Medications:     acetaminophen, 650 mg, Oral, Q4H PRN    albuterol-ipratropium, 3 mL, Nebulization, Q6H PRN    aluminum-magnesium hydroxide-simethicone, 30 mL, Oral, QID PRN    dextrose 50%, 12.5 g, Intravenous, PRN    dextrose 50%, 25 g, Intravenous, PRN    glucagon (human recombinant), 1 mg, Intramuscular, PRN    glucose, 16 g, Oral, PRN    glucose, 24 g, Oral, PRN    magnesium oxide, 800 mg, Oral, PRN    magnesium oxide, 800 mg,  "Oral, PRN    melatonin, 9 mg, Oral, Nightly PRN    naloxone, 0.02 mg, Intravenous, PRN    ondansetron, 4 mg, Intravenous, Q6H PRN    potassium bicarbonate, 35 mEq, Oral, PRN    potassium bicarbonate, 50 mEq, Oral, PRN    potassium bicarbonate, 60 mEq, Oral, PRN    potassium, sodium phosphates, 2 packet, Oral, PRN    potassium, sodium phosphates, 2 packet, Oral, PRN    potassium, sodium phosphates, 2 packet, Oral, PRN    senna-docusate 8.6-50 mg, 1 tablet, Oral, BID PRN    sodium chloride 0.9%, 10 mL, Intravenous, PRN    LABS AND DIAGNOSTICS     CBC LAST 3 DAYS  Recent Labs   Lab 02/24/25 1804 02/25/25  0541   WBC 4.08 3.36*   RBC 4.50* 4.71   HGB 11.0* 11.6*   HCT 36.5* 37.8*   MCV 81* 80*   MCH 24.4* 24.6*   MCHC 30.1* 30.7*   RDW 15.9* 16.0*    257   MPV 11.2 11.4   GRAN 74.1*  3.0 62.7  2.1   LYMPH 10.3*  0.4* 18.5  0.6*   MONO 13.2  0.5 14.9  0.5   BASO 0.05 0.05   NRBC 0 0       COAGULATION LAST 3 DAYS  No results for input(s): "LABPT", "INR", "APTT" in the last 168 hours.    CHEMISTRY LAST 3 DAYS  Recent Labs   Lab 02/24/25 1804 02/25/25  0541    140   K 3.4* 3.7    102   CO2 26 31*   ANIONGAP 9 7*   BUN 11 13   CREATININE 0.9 1.0    95   CALCIUM 8.3* 8.4*   MG  --  2.2   ALBUMIN 3.5  --    PROT 6.8  --    ALKPHOS 66  --    ALT 11  --    AST 17  --    BILITOT 0.8  --        CARDIAC PROFILE LAST 3 DAYS  Recent Labs   Lab 02/24/25 1804   BNP 1,062*   *       ENDOCRINE LAST 3 DAYS  No results for input(s): "TSH", "PROCAL" in the last 168 hours.    LAST ARTERIAL BLOOD GAS  ABG  No results for input(s): "PH", "PO2", "PCO2", "HCO3", "BE" in the last 168 hours.    LAST 7 DAYS MICROBIOLOGY   Microbiology Results (last 7 days)       ** No results found for the last 168 hours. **            MOST RECENT IMAGING  Echo    Left Ventricle: The left ventricle is severely dilated. Normal wall   thickness. Severe global hypokinesis present. There is severely reduced   systolic function " with a visually estimated ejection fraction of 25 - 30%.    Right Ventricle: The right ventricle is normal in size. Systolic   function is normal.    Aortic Valve: There is moderate aortic regurgitation.    IVC/SVC: Normal venous pressure at 3 mmHg.  US Lower Extremity Veins Bilateral  Narrative: EXAMINATION:  US LOWER EXTREMITY VEINS BILATERAL    CLINICAL HISTORY:  bilateral leg pain;    TECHNIQUE:  Duplex and color flow Doppler and dynamic compression was performed of the bilateral lower extremity veins was performed.    COMPARISON:  05/07/2024    FINDINGS:  Right thigh veins: The common femoral, femoral, popliteal, upper greater saphenous, and deep femoral veins are patent and free of thrombus. The veins are normally compressible and have normal phasic flow and augmentation response.    Right calf veins: The visualized calf veins are patent.    Left thigh veins: The common femoral, femoral, popliteal, upper greater saphenous, and deep femoral veins are patent and free of thrombus. The veins are normally compressible and have normal phasic flow and augmentation response.    Left calf veins: The visualized calf veins are patent.    Miscellaneous: None  Impression: No evidence of deep venous thrombosis in either lower extremity.    Electronically signed by: Jaswinder Navarro MD  Date:    02/25/2025  Time:    00:05      ECHOCARDIOGRAM RESULTS (last 5)  Results for orders placed during the hospital encounter of 02/24/25    Echo    Interpretation Summary    Left Ventricle: The left ventricle is severely dilated. Normal wall thickness. Severe global hypokinesis present. There is severely reduced systolic function with a visually estimated ejection fraction of 25 - 30%.    Right Ventricle: The right ventricle is normal in size. Systolic function is normal.    Aortic Valve: There is moderate aortic regurgitation.    IVC/SVC: Normal venous pressure at 3 mmHg.      Results for orders placed during the hospital encounter of  05/07/24    Echo    Interpretation Summary    Left Ventricle: The left ventricle is mildly dilated. Mildly increased wall thickness. Severe global hypokinesis present. There is severely reduced systolic function with a visually estimated ejection fraction of 15 - 20%. There is diastolic dysfunction but grade cannot be determined.    Right Ventricle: Normal right ventricular cavity size. Systolic function is normal.    Left Atrium: Left atrium is moderately dilated.    Right Atrium: Right atrium is mildly dilated.    Aortic Valve: . There is severe aortic regurgitation.    Mitral Valve:  There is mild regurgitation.    Pulmonic Valve: There is moderate regurgitation.    IVC/SVC: Intermediate venous pressure at 8 mmHg.      Results for orders placed during the hospital encounter of 12/08/22    Echo    Interpretation Summary  · The left ventricle is mildly enlarged with mild concentric hypertrophy and severely decreased systolic function.  · The estimated ejection fraction is 20%.  · Grade I left ventricular diastolic dysfunction.  · There is severe left ventricular global hypokinesis.  · Normal right ventricular size with normal right ventricular systolic function.  · Mild tricuspid regurgitation.  · Moderate-to-severe aortic regurgitation.  · Mild mitral regurgitation.  · Normal central venous pressure (3 mmHg).  · The estimated PA systolic pressure is 14 mmHg.  · There is no pulmonary hypertension.      CURRENT/PREVIOUS VISIT EKG  Results for orders placed or performed during the hospital encounter of 02/24/25   EKG 12-lead    Collection Time: 02/24/25  5:17 PM   Result Value Ref Range    QRS Duration 106 ms    [facility] QTC Calculation 491 ms    Narrative    Test Reason : R07.9,    Vent. Rate :  80 BPM     Atrial Rate :  80 BPM     P-R Int : 202 ms          QRS Dur : 106 ms      QT Int : 426 ms       P-R-T Axes :  11  37 220 degrees    QTcB Int : 491 ms    Atrial-paced rhythm  LVH with repolarization abnormality (  Kermit Varghese )  Prolonged QT  Abnormal ECG  No previous ECGs available    Referred By: AAAREFERRAL SELF           Confirmed By:            ASSESSMENT/PLAN:     Active Hospital Problems    Diagnosis    *Acute on chronic combined systolic (congestive) and diastolic (congestive) heart failure    Mitral valve insufficiency and aortic valve insufficiency    Thyroid disease    ICD (implantable cardioverter-defibrillator), dual, in situ    Hypertension    Sarcoidosis of lung with sarcoidosis of lymph nodes    Atherosclerotic heart disease of native coronary artery without angina pectoris    Paroxysmal atrial fibrillation    COPD (chronic obstructive pulmonary disease)       ASSESSMENT & PLAN:   Acute on chronic combined heart failure  Dilated Cardiomyopathy  ICD in situ  PAF  CAD, H/O PCI 2019  Aortic regurgitation (moderate)    RECOMMENDATIONS:  Presented with HFrEF exacerbation- Efx 25-30%  Continue diuresis with IV Lasix and obtain strict I/O and daily weight  Continue Entresto 24-26 mg BID, Spironolactone 25 mg daily, Toprol XL 25 mg daily  Continue Eliquis for PAF/CVA prophylaxis  Discontinued plavix    Stacey Aguayo NP  [facility]  Department of Cardiology  Date of Service: 02/25/2025                 [1]   Social History  Tobacco Use    Smoking status: Former     Current packs/day: 0.00     Types: Cigarettes     Quit date: 9/12/2014     Years since quitting: 10.4    Smokeless tobacco: Former   Substance Use Topics    Alcohol use: Not Currently    Drug use: Never   [2]   No current facility-administered medications on file prior to encounter.     Current Outpatient Medications on File Prior to Encounter   Medication Sig Dispense Refill    acetaminophen (TYLENOL) 500 MG tablet Take 500 mg by mouth every 6 (six) hours as needed for Pain.      amiodarone (PACERONE) 200 MG Tab Take 1 tablet (200 mg total) by mouth once daily. 30 tablet 0    apixaban (ELIQUIS) 5 mg Tab Take 5 mg by mouth  2 (two) times daily.      ascorbic acid, vitamin C, (VITAMIN C) 500 MG tablet Take 500 mg by mouth once daily.      clopidogreL (PLAVIX) 75 mg tablet Take 1 tablet (75 mg total) by mouth once daily. 30 tablet 0    ENTRESTO 24-26 mg per tablet TAKE 1 TABLET BY MOUTH TWICE DAILY 180 tablet 3    finasteride (PROSCAR) 5 mg tablet Take 1 tablet (5 mg total) by mouth once daily. 30 tablet 0    furosemide (LASIX) 20 MG tablet Take 1 tablet (20 mg total) by mouth once daily. 30 tablet 0    levothyroxine (SYNTHROID) 125 MCG tablet Take 125 mcg by mouth once daily.      metoprolol succinate (TOPROL-XL) 25 MG 24 hr tablet Take 1 tablet (25 mg total) by mouth once daily. 30 tablet 0    nitroGLYCERIN (NITROSTAT) 0.4 MG SL tablet Place 0.4 mg under the tongue every 5 (five) minutes as needed for Chest pain.      pantoprazole (PROTONIX) 40 MG tablet Take 40 mg by mouth once daily.      spironolactone (ALDACTONE) 25 MG tablet Take 1 tablet (25 mg total) by mouth once daily. 30 tablet 0    tamsulosin (FLOMAX) 0.4 mg Cap Take 1 capsule by mouth every evening.      azelastine (ASTELIN) 137 mcg (0.1 %) nasal spray 2 sprays by Nasal route 2 (two) times daily.

## 2025-02-25 NOTE — ASSESSMENT & PLAN NOTE
Echocardiogram with evidence of aortic , mitral, and pulmonic valve regurgitation that is  severe, mild, and moderate respectively  . The patient's most recent echocardiogram result is listed below.  Echo  Result Date: 5/8/2024    Left Ventricle: The left ventricle is mildly dilated. Mildly increased   wall thickness. Severe global hypokinesis present. There is severely   reduced systolic function with a visually estimated ejection fraction of   15 - 20%. There is diastolic dysfunction but grade cannot be determined.    Right Ventricle: Normal right ventricular cavity size. Systolic   function is normal.    Left Atrium: Left atrium is moderately dilated.    Right Atrium: Right atrium is mildly dilated.    Aortic Valve: . There is severe aortic regurgitation.    Mitral Valve:  There is mild regurgitation.    Pulmonic Valve: There is moderate regurgitation.    IVC/SVC: Intermediate venous pressure at 8 mmHg.        - TTE pending

## 2025-02-25 NOTE — PLAN OF CARE
Problem: Adult Inpatient Plan of Care  Goal: Plan of Care Review  Outcome: Progressing  Goal: Patient-Specific Goal (Individualized)  Outcome: Progressing  Goal: Optimal Comfort and Wellbeing  Outcome: Progressing  Goal: Readiness for Transition of Care  Outcome: Progressing     Problem: Adult Inpatient Plan of Care  Goal: Plan of Care Review  Outcome: Progressing     Problem: Adult Inpatient Plan of Care  Goal: Patient-Specific Goal (Individualized)  Outcome: Progressing     Problem: Adult Inpatient Plan of Care  Goal: Optimal Comfort and Wellbeing  Outcome: Progressing     Problem: Adult Inpatient Plan of Care  Goal: Readiness for Transition of Care  Outcome: Progressing

## 2025-02-25 NOTE — ASSESSMENT & PLAN NOTE
Patient has paroxysmal (<7 days) atrial fibrillation. Patient is currently in sinus rhythm. [id number] Score: 3. The patients heart rate in the last 24 hours is as follows:  Pulse  Min: 80  Max: 80     Antiarrhythmics  amiodarone tablet 200 mg, Daily, Oral  metoprolol succinate (TOPROL-XL) 24 hr tablet 25 mg, Daily, Oral    Anticoagulants  , 2 times daily, Oral  apixaban tablet 5 mg, 2 times daily, Oral    Plan  - Replete lytes with a goal of K>4, Mg >2  - Patient is anticoagulated, see medications listed above.  - Patient's afib is currently controlled  - Continue home amiodarone, metoprolol, and Eliquis

## 2025-02-25 NOTE — ED NOTES
Transfer of care given to ALINE Adam. Pt Aox4, RR even/ unlabored, No acaute distress. VSS. Telemetry monitor on/ verified. Pt has all personal belongings.  Waiting for flu/covid results before transport to unit.

## 2025-02-25 NOTE — HPI
Mr. Madrid is a 75 yr old male with a hx of PAF on long-term anti coag with Eliquis, HTN, ICD placement, thyroidectomy thyroid disease, RA, mitral valve insufficiency and aortic valve insufficiency, CAD s/p PCI, COPD, sarcoidosis, congestive heart failure, cardiomyopathy, anemia, dyslipidemia, abdominal aortic ectasia, BPH, GERD, osteoarthritis, cancer, histoplasmosis, sleep apnea who presented to the ED with a chief complaint of shortness of breath.  Patient endorses 1 day history of shortness of breath that began gradually and progressively worsened since.  He also endorses a did congestion, rhinorrhea, dry cough. He endorses his symptoms are worse with exertion and better at rest.  He endorses he has been taking his home Flonase with only temporary relief.  He denies recent sick contacts, home O2, PND.  He does endorse orthopnea and states that he sleeps sitting up.  He also endorses he ran out of his spironolactone about a week ago.  He denies tobacco, alcohol, and recreational drug use.  He denies fever, chills, chest pain, abdominal pain, nausea, vomiting, diarrhea, dysuria, hematuria, lightheadedness, dizziness, and syncope.    Upon arrival to ED, patient afebrile, HR of 80, RR of 16, BP of 127/81, satting 97% on RA.  Workup in the ED revealed RBC of 4.50, H/H of 11/36.5, potassium of 3.4, BNP of 1062, CPK of 260, initial troponin of 37.6, repeat troponin of 41.6.  CXR shows left chest wall cardiac pacing device.  Stable size and configuration of the cardiomediastinal silhouette.  Aortic atherosclerosis.  Lungs demonstrate bilateral coarse interstitial attenuation with chronic multifocal upper lung zone airspace opacities, relatively stable compared to prior study.  No new large confluent airspace consolidation.  No significant volume of pleural fluid or definitive pneumothorax.  Patient was given Lasix 40 mg IV, Mag sulfate 1 g IV, potassium bicarb 50 mEq oral while in the ED. case discussed with ED provider  and patient will be placed under observation for further management.

## 2025-02-25 NOTE — PLAN OF CARE
Inpatient Upgrade Note    Trevor Madrid has warranted treatment spanning two or more midnights of hospital level care for the management of heart failure. He continues to require IV diuresis, daily labs, monitoring of vital signs, and medication adjustments. His condition is also complicated by the following comorbidities: Coronary Artery Disease, Hypertension, Chronic respiratory disease, Heart failure, Malignancy, and sarcoidosis, GERD, anemia, histoplasmosis, sleep apnea .

## 2025-02-25 NOTE — PROGRESS NOTES
02/25/25 0117   Pain/Comfort/Sleep   Preferred Pain Scale number (Numeric Rating Pain Scale)   Comfort/Acceptable Pain Level 0   Pain Rating (0-10): Rest 3   Cognitive   Level of Consciousness (AVPU) alert   [place] Coma Scale   Best Eye Response 4-->(E4) spontaneous   Best Motor Response 6-->(M6) obeys commands   Best Verbal Response 5-->(V5) oriented   [place] Coma Scale Score 15   HEENT   HEENT WDL WDL   Respiratory   Respiratory WDL ex;breath sounds   ECG   Pulse 68        Peripheral IV - Single Lumen 02/24/25 1805 20 G Anterior;Proximal;Right Forearm   Placement Date/Time: 02/24/25 1805   Present Prior to Hospital Arrival?: No  Inserted by: RN  Size (G): 20 G  Orientation: Anterior;Proximal;Right  Location: Forearm  Placement directed by: Anatomic Landmarks  Site Prep: Chlorhexidine   Local Anesthet...   Site Assessment Clean;Dry;Intact   Dressing Status Clean;Dry;Intact   Skin   Skin WDL WDL   4EYES: 2 Clinical Staff Inspection of bony prominences No new pressure injury noted   4EYES: 2nd Clinical Staff Member (Type Name) rosana Francisco Risk Assessment   Sensory Perception 3-->slightly limited   Moisture 4-->rarely moist   Activity 3-->walks occasionally   Mobility 3-->slightly limited   Nutrition 3-->adequate   Friction and Shear 3-->no apparent problem   Nolan Score 19   Musculoskeletal   Musculoskeletal WDL ex;mobility   General Mobility generalized weakness;other (see comments)  (uses cane)   Functional Screen (every 3 days/change)   Ambulation 1 - assistive equipment   Transferring 1 - assistive equipment   Toileting 1 - assistive equipment   Bathing 0 - independent   Dressing 0 - independent   Eating 0 - independent   Communication 0 - understands/communicates without difficulty   Swallowing 0 - swallows foods/liquids without difficulty   Gastrointestinal   GI WDL WDL   Genitourinary   Genitourinary WDL WDL;voiding ability/characteristics   Coping/Psychosocial   Observed Emotional State accepting    Verbalized Emotional State acceptance   Psychosocial Support   Trust Relationship/Rapport care explained   Safety   Safety WDL WDL   Enhanced Safety Measures bed alarm refused   Fall Risk Assessment (every shift)   History Of Fall (W/I 3 Mos) 0-->No   Polypharmacy 3-->Yes   Central Nervous System/Psychotropic Medication 0-->No   Cardiovascular Medication 3-->Yes   Age Greater Than 65 Years 2-->Yes   Altered Elimination 0-->No   Cognitive Deficit 0-->No   Sensory Deficit 0-->No   Dizziness/Vertigo 0-->No   Depression 0-->No   Mobility Deficit/Weakness 2-->Yes   Male 1-->Yes   Fall Risk Score 11   ABC Risk for Fall with Injury Assessment   A= Age: Is the patient greater than or equal to 85 years old or frail due to clinical condition? No   B=Bones: Does the patient have osteoporosis, previous fracture, prolonged steroid use, or metastatic bone cancer? No   C=Coagulation Disorders: Does the patient have a bleeding disorder, either through anticoagulants or underlying clinical condition? No   S=recent Surgery: Is the patient post-op surgicalwith a recent lower limb amputation or recent major abdominal or thoracic surgery? No   Safety Management   Safety Promotion/Fall Prevention bed alarm refused   Medication Review/Management medications reviewed   Patient Rounds bed in low position;ID band on;clutter free environment maintained;call light in patient/parent reach;bed wheels locked   Safety Bands on Patient Fall Risk Band   Mobility   GEMS ([place] Early Mobility Scale) Level 4-Independent activities   Positioning   Body Position position changed independently   RN Clinical Review   I have evaluated the data collected on this patient and nursing care provided. Done

## 2025-02-25 NOTE — H&P
[facility] - Emergency Dept  Hospital Medicine  History & Physical    Patient Name: Trevor Madrid  MRN: [id number]  Patient Class: OP- Observation  Admission Date: 2/24/2025  Attending Physician: Namrata Levin MD   Primary Care Provider: Martín Tello NP         Patient information was obtained from patient, past medical records, and ER records.     Subjective:     Principal Problem:Acute on chronic combined systolic (congestive) and diastolic (congestive) heart failure    Chief Complaint:   Chief Complaint   Patient presents with    Shortness of Breath     For several days    Leg Pain        HPI: Mr. Madrid is a 75 yr old male with a hx of PAF on long-term anti coag with Eliquis, HTN, ICD placement, thyroidectomy thyroid disease, RA, mitral valve insufficiency and aortic valve insufficiency, CAD s/p PCI, COPD, sarcoidosis, congestive heart failure, cardiomyopathy, anemia, dyslipidemia, abdominal aortic ectasia, BPH, GERD, osteoarthritis, cancer, histoplasmosis, sleep apnea who presented to the ED with a chief complaint of shortness of breath.  Patient endorses 1 day history of shortness of breath that began gradually and progressively worsened since.  He also endorses a did congestion, rhinorrhea, dry cough. He endorses his symptoms are worse with exertion and better at rest.  He endorses he has been taking his home Flonase with only temporary relief.  He denies recent sick contacts, home O2, PND.  He does endorse orthopnea and states that he sleeps sitting up.  He also endorses he ran out of his spironolactone about a week ago.  He denies tobacco, alcohol, and recreational drug use.  He denies fever, chills, chest pain, abdominal pain, nausea, vomiting, diarrhea, dysuria, hematuria, lightheadedness, dizziness, and syncope.    Upon arrival to ED, patient afebrile, HR of 80, RR of 16, BP of 127/81, satting 97% on RA.  Workup in the ED revealed RBC of 4.50, H/H of 11/36.5, potassium of 3.4, BNP of  1062, CPK of 260, initial troponin of 37.6, repeat troponin of 41.6.  CXR shows left chest wall cardiac pacing device.  Stable size and configuration of the cardiomediastinal silhouette.  Aortic atherosclerosis.  Lungs demonstrate bilateral coarse interstitial attenuation with chronic multifocal upper lung zone airspace opacities, relatively stable compared to prior study.  No new large confluent airspace consolidation.  No significant volume of pleural fluid or definitive pneumothorax.  Patient was given Lasix 40 mg IV, Mag sulfate 1 g IV, potassium bicarb 50 mEq oral while in the ED. case discussed with ED provider and patient will be placed under observation for further management.    Past Medical History:   Diagnosis Date    A-fib     AICD (automatic cardioverter/defibrillator) present     Arthritis     Atrial fibrillation     Cancer     Cardiomyopathy     COPD (chronic obstructive pulmonary disease)     Heart failure     Histoplasmosis     Hypertension     Sarcoidosis     Sleep apnea     Thyroid disease        Past Surgical History:   Procedure Laterality Date    CARDIAC CATHETERIZATION      CARDIAC DEFIBRILLATOR PLACEMENT Left 01/2020    CORONARY ANGIOGRAPHY Left 9/16/2019    Procedure: CATHETERIZATION, HEART, LEFT (RIGHT RADIAL);  Surgeon: Gerald Romero MD;  Location: [facility] CATH/EP LAB;  Service: Cardiology;  Laterality: Left;    CORONARY STENT PLACEMENT      INSERTION OF BIVENTRICULAR IMPLANTABLE CARDIOVERTER-DEFIBRILLATOR (ICD)      TOTAL THYROIDECTOMY         Review of patient's allergies indicates:  No Known Allergies    No current facility-administered medications on file prior to encounter.     Current Outpatient Medications on File Prior to Encounter   Medication Sig    acetaminophen (TYLENOL) 500 MG tablet Take 500 mg by mouth every 6 (six) hours as needed for Pain.    amiodarone (PACERONE) 200 MG Tab Take 1 tablet (200 mg total) by mouth once daily.    apixaban (ELIQUIS) 5 mg Tab Take 5 mg  by mouth 2 (two) times daily.    ascorbic acid, vitamin C, (VITAMIN C) 500 MG tablet Take 500 mg by mouth once daily.    clopidogreL (PLAVIX) 75 mg tablet Take 1 tablet (75 mg total) by mouth once daily.    ENTRESTO 24-26 mg per tablet TAKE 1 TABLET BY MOUTH TWICE DAILY    finasteride (PROSCAR) 5 mg tablet Take 1 tablet (5 mg total) by mouth once daily.    furosemide (LASIX) 20 MG tablet Take 1 tablet (20 mg total) by mouth once daily.    levothyroxine (SYNTHROID) 125 MCG tablet Take 125 mcg by mouth once daily.    metoprolol succinate (TOPROL-XL) 25 MG 24 hr tablet Take 1 tablet (25 mg total) by mouth once daily.    nitroGLYCERIN (NITROSTAT) 0.4 MG SL tablet Place 0.4 mg under the tongue every 5 (five) minutes as needed for Chest pain.    pantoprazole (PROTONIX) 40 MG tablet Take 40 mg by mouth once daily.    spironolactone (ALDACTONE) 25 MG tablet Take 1 tablet (25 mg total) by mouth once daily.    tamsulosin (FLOMAX) 0.4 mg Cap Take 1 capsule by mouth every evening.    azelastine (ASTELIN) 137 mcg (0.1 %) nasal spray 2 sprays by Nasal route 2 (two) times daily.     Family History       Problem Relation (Age of Onset)    Hypertension Father          Tobacco Use    Smoking status: Former     Current packs/day: 0.00     Types: Cigarettes     Quit date: 9/12/2014     Years since quitting: 10.4    Smokeless tobacco: Former   Substance and Sexual Activity    Alcohol use: Not Currently    Drug use: Never    Sexual activity: Not on file     Review of Systems   Constitutional:  Negative for chills and fever.   HENT:  Positive for congestion and rhinorrhea.    Respiratory:  Positive for cough and shortness of breath.    Cardiovascular:  Negative for chest pain.   Gastrointestinal:  Negative for abdominal pain, diarrhea, nausea and vomiting.   Genitourinary:  Negative for dysuria and hematuria.   Neurological:  Negative for dizziness, syncope and light-headedness.     Objective:     Vital Signs (Most Recent):  Temp: 98.2 °F  (36.8 °C) (02/24/25 1720)  Pulse: 80 (02/24/25 1720)  Resp: 16 (02/24/25 1720)  BP: 127/81 (02/24/25 1720)  SpO2: 97 % (02/24/25 1720) Vital Signs (24h Range):  Temp:  [98.2 °F (36.8 °C)] 98.2 °F (36.8 °C)  Pulse:  [80] 80  Resp:  [16] 16  SpO2:  [97 %] 97 %  BP: (127)/(81) 127/81     Weight: 63 kg (139 lb)  Body mass index is 21.13 kg/m².     Physical Exam  Vitals reviewed.   Constitutional:       General: He is awake. He is not in acute distress.     Appearance: Normal appearance. He is not ill-appearing or diaphoretic.   Cardiovascular:      Rate and Rhythm: Normal rate.      Heart sounds: Murmur heard.      No friction rub. No gallop.      Comments: Trace BLE edema  Pulmonary:      Effort: Pulmonary effort is normal. No accessory muscle usage or respiratory distress.      Breath sounds: Rales (Faint bibasilar as well as in upper lobes) present. No wheezing or rhonchi.   Abdominal:      General: Abdomen is flat. Bowel sounds are normal.      Tenderness: There is no abdominal tenderness. There is no guarding or rebound.   Skin:     General: Skin is warm and dry.   Neurological:      Mental Status: He is alert and oriented to person, place, and time.   Psychiatric:         Behavior: Behavior is cooperative.                Significant Labs: All pertinent labs within the past 24 hours have been reviewed.  CBC:   Recent Labs   Lab 02/24/25  1804   WBC 4.08   HGB 11.0*   HCT 36.5*        CMP:   Recent Labs   Lab 02/24/25  1804      K 3.4*      CO2 26      BUN 11   CREATININE 0.9   CALCIUM 8.3*   PROT 6.8   ALBUMIN 3.5   BILITOT 0.8   ALKPHOS 66   AST 17   ALT 11   ANIONGAP 9     Cardiac Markers:   Recent Labs   Lab 02/24/25 1804   BNP 1,062*     Troponin:   Recent Labs   Lab 02/24/25 1804 02/24/25 1959   TROPONINIHS 37.6* 41.6*       Significant Imaging:   Imaging Results              US Lower Extremity Veins Bilateral (Final result)  Result time 02/25/25 00:05:09      Final result by  Jaswinder Navarro MD (02/25/25 00:05:09)                   Impression:      No evidence of deep venous thrombosis in either lower extremity.      Electronically signed by: Jaswinder Navarro MD  Date:    02/25/2025  Time:    00:05               Narrative:    EXAMINATION:  US LOWER EXTREMITY VEINS BILATERAL    CLINICAL HISTORY:  bilateral leg pain;    TECHNIQUE:  Duplex and color flow Doppler and dynamic compression was performed of the bilateral lower extremity veins was performed.    COMPARISON:  05/07/2024    FINDINGS:  Right thigh veins: The common femoral, femoral, popliteal, upper greater saphenous, and deep femoral veins are patent and free of thrombus. The veins are normally compressible and have normal phasic flow and augmentation response.    Right calf veins: The visualized calf veins are patent.    Left thigh veins: The common femoral, femoral, popliteal, upper greater saphenous, and deep femoral veins are patent and free of thrombus. The veins are normally compressible and have normal phasic flow and augmentation response.    Left calf veins: The visualized calf veins are patent.    Miscellaneous: None                                       X-Ray Chest PA And Lateral (Final result)  Result time 02/24/25 19:06:48      Final result by Jaswinder Navarro MD (02/24/25 19:06:48)                   Impression:      Stable abnormal chest radiograph as above.      Electronically signed by: Jaswinder Navarro MD  Date:    02/24/2025  Time:    19:06               Narrative:    EXAMINATION:  XR CHEST PA AND LATERAL    CLINICAL HISTORY:  Chest Pain;    TECHNIQUE:  PA and lateral views of the chest were performed.    COMPARISON:  05/08/2024    FINDINGS:  There is a left chest wall cardiac pacing device present.  Stable size and configuration of the cardiomediastinal silhouette.  There is aortic atherosclerosis.  Lungs demonstrate bilateral coarse interstitial attenuation with chronic multifocal upper lung zone airspace  opacities, relatively stable compared to prior study of 05/08/2024. No new large confluent airspace consolidation appreciated.  No significant volume of pleural fluid or definitive pneumothorax identified.  Osseous structures demonstrate mild degenerative changes.                                     Assessment/Plan:     * Acute on chronic combined systolic (congestive) and diastolic (congestive) heart failure  Patient has Combined Systolic and Diastolic heart failure that is Acute on chronic. On presentation their CHF was decompensated. Evidence of decompensated CHF on presentation includes: crackles on lung auscultation, orthopnea, dyspnea on exertion (NIEVES), and shortness of breath. Most recent BNP and echo results are listed below.  Recent Labs     02/24/25  1804   BNP 1,062*     Latest ECHO  Results for orders placed during the hospital encounter of 05/07/24    Echo    Interpretation Summary    Left Ventricle: The left ventricle is mildly dilated. Mildly increased wall thickness. Severe global hypokinesis present. There is severely reduced systolic function with a visually estimated ejection fraction of 15 - 20%. There is diastolic dysfunction but grade cannot be determined.    Right Ventricle: Normal right ventricular cavity size. Systolic function is normal.    Left Atrium: Left atrium is moderately dilated.    Right Atrium: Right atrium is mildly dilated.    Aortic Valve: . There is severe aortic regurgitation.    Mitral Valve:  There is mild regurgitation.    Pulmonic Valve: There is moderate regurgitation.    IVC/SVC: Intermediate venous pressure at 8 mmHg.    Current Heart Failure Medications  sacubitriL-valsartan 24-26 mg per tablet 1 tablet, 2 times daily, Oral  metoprolol succinate (TOPROL-XL) 24 hr tablet 25 mg, Daily, Oral  spironolactone tablet 25 mg, Daily, Oral  furosemide injection 20 mg, Every 12 hours, Intravenous    Plan  - Monitor strict I&Os and daily weights.    - Place on telemetry  - Low  sodium diet  - Place on fluid restriction of 1.5 L.   - Cardiology has not been consulted  - The patient's volume status is stable but not at their baseline as indicated by crackles on lung auscultation, orthopnea, dyspnea on exertion (NIEVES), and shortness of breath  - Lasix 20 mg IB BID  - Continue home Entresto, metoprolol, spironolactone  - TTE pending    Sarcoidosis of lung with sarcoidosis of lymph nodes  - Hx noted  - Continue OP F/U      COPD (chronic obstructive pulmonary disease)  Patient's COPD is controlled currently.  Patient is currently off COPD Pathway. Continue scheduled inhalers Supplemental oxygen and monitor respiratory status closely.     - DuoNebs Q6H PRN    Atherosclerotic heart disease of native coronary artery without angina pectoris  Patient with known CAD s/p stent placement, which is controlled Will continue Eliquis, Plavix and Statin and monitor for S/Sx of angina/ACS. Continue to monitor on telemetry.     Mitral valve insufficiency and aortic valve insufficiency  Echocardiogram with evidence of aortic , mitral, and pulmonic valve regurgitation that is  severe, mild, and moderate respectively  . The patient's most recent echocardiogram result is listed below.  Echo  Result Date: 5/8/2024    Left Ventricle: The left ventricle is mildly dilated. Mildly increased   wall thickness. Severe global hypokinesis present. There is severely   reduced systolic function with a visually estimated ejection fraction of   15 - 20%. There is diastolic dysfunction but grade cannot be determined.    Right Ventricle: Normal right ventricular cavity size. Systolic   function is normal.    Left Atrium: Left atrium is moderately dilated.    Right Atrium: Right atrium is mildly dilated.    Aortic Valve: . There is severe aortic regurgitation.    Mitral Valve:  There is mild regurgitation.    Pulmonic Valve: There is moderate regurgitation.    IVC/SVC: Intermediate venous pressure at 8 mmHg.        - TTE  pending    Thyroid disease  - Hx noted  - S/P thyroidectomy  - Continue home synthroid      ICD (implantable cardioverter-defibrillator), dual, in situ  - Hx noted      Hypertension  Chronic, Latest blood pressure and vitals reviewed-     Temp:  [98.2 °F (36.8 °C)]   Pulse:  [80]   Resp:  [16]   BP: (127)/(81)   SpO2:  [97 %] .   Home meds for hypertension were reviewed and noted below-  Hypertension Medications              ENTRESTO 24-26 mg per tablet TAKE 1 TABLET BY MOUTH TWICE DAILY    furosemide (LASIX) 20 MG tablet Take 1 tablet (20 mg total) by mouth once daily.    metoprolol succinate (TOPROL-XL) 25 MG 24 hr tablet Take 1 tablet (25 mg total) by mouth once daily.    nitroGLYCERIN (NITROSTAT) 0.4 MG SL tablet Place 0.4 mg under the tongue every 5 (five) minutes as needed for Chest pain.    spironolactone (ALDACTONE) 25 MG tablet Take 1 tablet (25 mg total) by mouth once daily.            While in the hospital, will manage blood pressure as follows; Continue home antihypertensive regimen    Will utilize p.r.n. blood pressure medication only if patient's blood pressure greater than 180/110 and he develops symptoms such as worsening chest pain or shortness of breath.      Paroxysmal atrial fibrillation  Patient has paroxysmal (<7 days) atrial fibrillation. Patient is currently in sinus rhythm. [id number] Score: 3. The patients heart rate in the last 24 hours is as follows:  Pulse  Min: 80  Max: 80     Antiarrhythmics  amiodarone tablet 200 mg, Daily, Oral  metoprolol succinate (TOPROL-XL) 24 hr tablet 25 mg, Daily, Oral    Anticoagulants  , 2 times daily, Oral  apixaban tablet 5 mg, 2 times daily, Oral    Plan  - Replete lytes with a goal of K>4, Mg >2  - Patient is anticoagulated, see medications listed above.  - Patient's afib is currently controlled  - Continue home amiodarone, metoprolol, and Eliquis      VTE Risk Mitigation (From admission, onward)           Ordered     apixaban tablet 5 mg  2 times daily          02/24/25 2353     Place sequential compression device  Until discontinued         02/24/25 2316     Reason for No Pharmacological VTE Prophylaxis  Once        Question:  Reasons:  Answer:  Already adequately anticoagulated on oral Anticoagulants    02/24/25 2316                         On 02/25/2025, patient should be placed in hospital observation services under my care in collaboration with Namrata Levin MD.           Toya Javier PA-C  Department of Hospital Medicine  [facility] - Emergency Dept

## 2025-02-25 NOTE — PLAN OF CARE
02/25/25 1340   MTZ Message   Medicare Outpatient and Observation Notification regarding financial responsibility Explained to patient/caregiver;Signed/date by patient/caregiver   Date MTZ was signed 02/25/25   Time MTZ was signed 1340

## 2025-02-26 ENCOUNTER — TELEPHONE (OUTPATIENT)
Dept: CARDIOLOGY | Facility: CLINIC | Age: 75
End: 2025-02-26
Payer: MEDICARE

## 2025-02-26 VITALS
WEIGHT: 139.19 LBS | DIASTOLIC BLOOD PRESSURE: 61 MMHG | HEIGHT: 68 IN | SYSTOLIC BLOOD PRESSURE: 91 MMHG | RESPIRATION RATE: 20 BRPM | TEMPERATURE: 98 F | OXYGEN SATURATION: 95 % | BODY MASS INDEX: 21.1 KG/M2 | HEART RATE: 71 BPM

## 2025-02-26 LAB
ANION GAP SERPL CALC-SCNC: 7 MMOL/L (ref 8–16)
BASOPHILS # BLD AUTO: 0.05 K/UL (ref 0–0.2)
BASOPHILS NFR BLD: 1.7 % (ref 0–1.9)
BUN SERPL-MCNC: 12 MG/DL (ref 8–23)
CALCIUM SERPL-MCNC: 8.3 MG/DL (ref 8.7–10.5)
CHLORIDE SERPL-SCNC: 102 MMOL/L (ref 95–110)
CO2 SERPL-SCNC: 28 MMOL/L (ref 23–29)
CREAT SERPL-MCNC: 0.8 MG/DL (ref 0.5–1.4)
DIFFERENTIAL METHOD BLD: ABNORMAL
EOSINOPHIL # BLD AUTO: 0.1 K/UL (ref 0–0.5)
EOSINOPHIL NFR BLD: 3.1 % (ref 0–8)
ERYTHROCYTE [DISTWIDTH] IN BLOOD BY AUTOMATED COUNT: 16.1 % (ref 11.5–14.5)
EST. GFR  (NO RACE VARIABLE): >60 ML/MIN/1.73 M^2
GLUCOSE SERPL-MCNC: 90 MG/DL (ref 70–110)
HCT VFR BLD AUTO: 37.3 % (ref 40–54)
HGB BLD-MCNC: 11.4 G/DL (ref 14–18)
IMM GRANULOCYTES # BLD AUTO: 0.01 K/UL (ref 0–0.04)
IMM GRANULOCYTES NFR BLD AUTO: 0.3 % (ref 0–0.5)
LYMPHOCYTES # BLD AUTO: 0.5 K/UL (ref 1–4.8)
LYMPHOCYTES NFR BLD: 18.2 % (ref 18–48)
MAGNESIUM SERPL-MCNC: 2.1 MG/DL (ref 1.6–2.6)
MCH RBC QN AUTO: 24.4 PG (ref 27–31)
MCHC RBC AUTO-ENTMCNC: 30.6 G/DL (ref 32–36)
MCV RBC AUTO: 80 FL (ref 82–98)
MONOCYTES # BLD AUTO: 0.5 K/UL (ref 0.3–1)
MONOCYTES NFR BLD: 15.4 % (ref 4–15)
NEUTROPHILS # BLD AUTO: 1.8 K/UL (ref 1.8–7.7)
NEUTROPHILS NFR BLD: 61.3 % (ref 38–73)
NRBC BLD-RTO: 0 /100 WBC
OHS QRS DURATION: 106 MS
OHS QTC CALCULATION: 491 MS
PHOSPHATE SERPL-MCNC: 2.5 MG/DL (ref 2.7–4.5)
PLATELET # BLD AUTO: 227 K/UL (ref 150–450)
PMV BLD AUTO: 10.4 FL (ref 9.2–12.9)
POTASSIUM SERPL-SCNC: 3.8 MMOL/L (ref 3.5–5.1)
RBC # BLD AUTO: 4.68 M/UL (ref 4.6–6.2)
SODIUM SERPL-SCNC: 137 MMOL/L (ref 136–145)
WBC # BLD AUTO: 2.92 K/UL (ref 3.9–12.7)

## 2025-02-26 PROCEDURE — 94761 N-INVAS EAR/PLS OXIMETRY MLT: CPT

## 2025-02-26 PROCEDURE — 83735 ASSAY OF MAGNESIUM: CPT

## 2025-02-26 PROCEDURE — 99233 SBSQ HOSP IP/OBS HIGH 50: CPT | Mod: ,,, | Performed by: INTERNAL MEDICINE

## 2025-02-26 PROCEDURE — 25000003 PHARM REV CODE 250

## 2025-02-26 PROCEDURE — 25000003 PHARM REV CODE 250: Performed by: INTERNAL MEDICINE

## 2025-02-26 PROCEDURE — 84100 ASSAY OF PHOSPHORUS: CPT

## 2025-02-26 PROCEDURE — 85025 COMPLETE CBC W/AUTO DIFF WBC: CPT

## 2025-02-26 PROCEDURE — 36415 COLL VENOUS BLD VENIPUNCTURE: CPT

## 2025-02-26 PROCEDURE — 99900035 HC TECH TIME PER 15 MIN (STAT)

## 2025-02-26 PROCEDURE — 80048 BASIC METABOLIC PNL TOTAL CA: CPT

## 2025-02-26 PROCEDURE — 63600175 PHARM REV CODE 636 W HCPCS

## 2025-02-26 PROCEDURE — 96376 TX/PRO/DX INJ SAME DRUG ADON: CPT

## 2025-02-26 RX ORDER — SPIRONOLACTONE 25 MG/1
25 TABLET ORAL DAILY
Qty: 30 TABLET | Refills: 0 | Status: SHIPPED | OUTPATIENT
Start: 2025-02-26

## 2025-02-26 RX ORDER — FUROSEMIDE 20 MG/1
40 TABLET ORAL DAILY
Qty: 30 TABLET | Refills: 0 | Status: SHIPPED | OUTPATIENT
Start: 2025-02-26

## 2025-02-26 RX ADMIN — AMIODARONE HYDROCHLORIDE 200 MG: 200 TABLET ORAL at 08:02

## 2025-02-26 RX ADMIN — SACUBITRIL AND VALSARTAN 1 TABLET: 24; 26 TABLET, FILM COATED ORAL at 08:02

## 2025-02-26 RX ADMIN — PANTOPRAZOLE SODIUM 40 MG: 40 TABLET, DELAYED RELEASE ORAL at 06:02

## 2025-02-26 RX ADMIN — FUROSEMIDE 20 MG: 10 INJECTION, SOLUTION INTRAMUSCULAR; INTRAVENOUS at 08:02

## 2025-02-26 RX ADMIN — LEVOTHYROXINE SODIUM 125 MCG: 0.1 TABLET ORAL at 06:02

## 2025-02-26 RX ADMIN — SPIRONOLACTONE 25 MG: 25 TABLET ORAL at 08:02

## 2025-02-26 RX ADMIN — APIXABAN 5 MG: 5 TABLET, FILM COATED ORAL at 08:02

## 2025-02-26 RX ADMIN — FINASTERIDE 5 MG: 5 TABLET, FILM COATED ORAL at 08:02

## 2025-02-26 RX ADMIN — METOPROLOL SUCCINATE 25 MG: 25 TABLET, FILM COATED, EXTENDED RELEASE ORAL at 08:02

## 2025-02-26 RX ADMIN — OXYCODONE HYDROCHLORIDE AND ACETAMINOPHEN 500 MG: 500 TABLET ORAL at 08:02

## 2025-02-26 RX ADMIN — ACETAMINOPHEN 650 MG: 325 TABLET ORAL at 10:02

## 2025-02-26 NOTE — HOSPITAL COURSE
Patient monitored closely during hospitalization.  He was admitted for CHF exacerbation.  Cardiology was consulted.  He was diuresed with IV lasix and had significant improvement in his symptoms.  He has been cleared for discharge home by Cardiology.  Patient seen and examined on day of discharge and deemed stable for discharge home.  He is to follow up with PCP and Cardiology in 1-2 weeks.  Patient agreeable to discharge plan.

## 2025-02-26 NOTE — PROGRESS NOTES
02/25/25 4252   Pain/Comfort/Sleep   Preferred Pain Scale number (Numeric Rating Pain Scale)   Comfort/Acceptable Pain Level 0   Pain Rating (0-10): Rest 0   Pain Reassessment   Pain Rating Prior to Med Admin 4   Cognitive   Cognitive/Neuro/Behavioral WDL WDL   Level of Consciousness (AVPU) alert   Pupils   Pupil PERRLA yes   [place] Coma Scale   Best Eye Response 4-->(E4) spontaneous   Best Motor Response 6-->(M6) obeys commands   Best Verbal Response 5-->(V5) oriented   [place] Coma Scale Score 15   HEENT   HEENT WDL WDL   Respiratory   Respiratory WDL WDL   Cardiac   Cardiac WDL ex   Peripheral Neurovascular   Peripheral Neurovascular WDL WDL   Skin   Skin WDL ex;color/characteristics   4EYES: 2 Clinical Staff Inspection of bony prominences No new pressure injury noted   4EYES: 2nd Clinical Staff Member (Type Name) marisol Francisco Risk Assessment   Sensory Perception 3-->slightly limited   Moisture 3-->occasionally moist   Activity 3-->walks occasionally   Mobility 3-->slightly limited   Nutrition 3-->adequate   Friction and Shear 3-->no apparent problem   Nolan Score 18   Musculoskeletal   Musculoskeletal WDL ex;mobility   General Mobility generalized weakness   Functional Screen (every 3 days/change)   Ambulation 1 - assistive equipment   Transferring 1 - assistive equipment   Toileting 1 - assistive equipment   Bathing 0 - independent   Dressing 0 - independent   Eating 0 - independent   Communication 0 - understands/communicates without difficulty   Swallowing 0 - swallows foods/liquids without difficulty   Gastrointestinal   GI WDL WDL   Stool Occurrence 1   Last Bowel Movement 02/25/25   Genitourinary   Genitourinary WDL WDL   Coping/Psychosocial   Observed Emotional State accepting   Verbalized Emotional State acceptance   Psychosocial Support   Trust Relationship/Rapport care explained   Safety   Safety WDL WDL   Enhanced Safety Measures bed alarm refused   Fall Risk Assessment (every shift)   History  Of Fall (W/I 3 Mos) 0-->No   Polypharmacy 3-->Yes   Central Nervous System/Psychotropic Medication 0-->No   Cardiovascular Medication 3-->Yes   Age Greater Than 65 Years 2-->Yes   Altered Elimination 0-->No   Cognitive Deficit 0-->No   Sensory Deficit 0-->No   Dizziness/Vertigo 0-->No   Depression 0-->No   Mobility Deficit/Weakness 2-->Yes   ABC Risk for Fall with Injury Assessment   A= Age: Is the patient greater than or equal to 85 years old or frail due to clinical condition? No   B=Bones: Does the patient have osteoporosis, previous fracture, prolonged steroid use, or metastatic bone cancer? No   C=Coagulation Disorders: Does the patient have a bleeding disorder, either through anticoagulants or underlying clinical condition? No   S=recent Surgery: Is the patient post-op surgicalwith a recent lower limb amputation or recent major abdominal or thoracic surgery? No   Safety Management   Safety Promotion/Fall Prevention bed alarm refused   Medication Review/Management medications reviewed   Patient Rounds call light in patient/parent reach;ID band on;bed wheels locked;bed in low position   Safety Bands on Patient Fall Risk Band   Daily Care   Activity Management Ambulated -L4   Mobility   GEMS ([place] Early Mobility Scale) Level 4-Independent activities   Positioning   Body Position position maintained   Head of Bed (HOB) Positioning HOB at 30-45 degrees   RN Clinical Review   I have evaluated the data collected on this patient and nursing care provided. Done
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
## 2025-02-26 NOTE — TELEPHONE ENCOUNTER
----- Message from  Lanny sent at 2/26/2025  9:00 AM CST -----  Good morningApt noted with Kimberly Nichole on March 20th. Patient discharging from [facility] after CHF exacerbation - please reschedule appt to sooner date, if available.Thank Osvaldo GAY

## 2025-02-26 NOTE — DISCHARGE SUMMARY
[facility] Medicine  Discharge Summary      Patient Name: Trevor Madrid  MRN: [id number]  BRYAN: [id number]  Patient Class: IP- Inpatient  Admission Date: 2/24/2025  Hospital Length of Stay: 1 days  Discharge Date and Time:  02/26/2025 2:56 PM  Attending Physician: Juan Ramirez MD   Discharging Provider: Radha Jett NP  Primary Care Provider: Martín Tello NP    Primary Care Team: Networked reference to record PCT     HPI:   Mr. Madrid is a 75 yr old male with a hx of PAF on long-term anti coag with Eliquis, HTN, ICD placement, thyroidectomy thyroid disease, RA, mitral valve insufficiency and aortic valve insufficiency, CAD s/p PCI, COPD, sarcoidosis, congestive heart failure, cardiomyopathy, anemia, dyslipidemia, abdominal aortic ectasia, BPH, GERD, osteoarthritis, cancer, histoplasmosis, sleep apnea who presented to the ED with a chief complaint of shortness of breath.  Patient endorses 1 day history of shortness of breath that began gradually and progressively worsened since.  He also endorses a did congestion, rhinorrhea, dry cough. He endorses his symptoms are worse with exertion and better at rest.  He endorses he has been taking his home Flonase with only temporary relief.  He denies recent sick contacts, home O2, PND.  He does endorse orthopnea and states that he sleeps sitting up.  He also endorses he ran out of his spironolactone about a week ago.  He denies tobacco, alcohol, and recreational drug use.  He denies fever, chills, chest pain, abdominal pain, nausea, vomiting, diarrhea, dysuria, hematuria, lightheadedness, dizziness, and syncope.    Upon arrival to ED, patient afebrile, HR of 80, RR of 16, BP of 127/81, satting 97% on RA.  Workup in the ED revealed RBC of 4.50, H/H of 11/36.5, potassium of 3.4, BNP of 1062, CPK of 260, initial troponin of 37.6, repeat troponin of 41.6.  CXR shows left chest wall cardiac pacing device.  Stable size and configuration of the  cardiomediastinal silhouette.  Aortic atherosclerosis.  Lungs demonstrate bilateral coarse interstitial attenuation with chronic multifocal upper lung zone airspace opacities, relatively stable compared to prior study.  No new large confluent airspace consolidation.  No significant volume of pleural fluid or definitive pneumothorax.  Patient was given Lasix 40 mg IV, Mag sulfate 1 g IV, potassium bicarb 50 mEq oral while in the ED. case discussed with ED provider and patient will be placed under observation for further management.    * No surgery found *      Hospital Course:   Patient monitored closely during hospitalization.  He was admitted for CHF exacerbation.  Cardiology was consulted.  He was diuresed with IV lasix and had significant improvement in his symptoms.  He has been cleared for discharge home by Cardiology.  Patient seen and examined on day of discharge and deemed stable for discharge home.  He is to follow up with PCP and Cardiology in 1-2 weeks.  Patient agreeable to discharge plan.        Goals of Care Treatment Preferences:  Code Status: Full Code      SDOH Screening:  The patient was screened for utility difficulties, food insecurity, transport difficulties, housing insecurity, and interpersonal safety and there were no concerns identified this admission.     Consults:   Consults (From admission, onward)          Status Ordering Provider     Inpatient consult to Cardiology  Once        Provider:  Bruno Nunes MD    Completed TRA PRETTY     Inpatient consult to Social Work/Case Management  Once        Provider:  (Not yet assigned)    Completed CHRISTIAN DOWNS     Inpatient consult to Registered Dietitian/Nutritionist  Once        Provider:  (Not yet assigned)    CHRISTIAN Frank              Final Active Diagnoses:    Diagnosis Date Noted POA    PRINCIPAL PROBLEM:  Acute on chronic combined systolic (congestive) and diastolic (congestive) heart failure [I50.43]  02/24/2025 Yes    Mitral valve insufficiency and aortic valve insufficiency [I08.0] 08/06/2024 Yes    Thyroid disease [E07.9] 12/08/2022 Yes    ICD (implantable cardioverter-defibrillator), dual, in situ [Z95.810] 05/10/2021 Yes    Hypertension [I10] 05/10/2021 Yes    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    Specialty: Cardiology  Why: As scheduled - InterviewBest message sent to clinic requesting sooner follow up apppointment due to hospital admission - clinic to contact patient if sooner appointment available  Contact information:  [address] [facility] [address]  [facility] [address]  [phone]               Martín Tello NP Follow up on 3/5/2025.    Specialty: Family Medicine  Why: 1:45 PM - hospital follow up appointment scheduled  Contact information:  [address]  [place] [address]                           Patient Instructions:      Diet Cardiac     Notify your health care provider if you experience any of the following:  temperature >100.4     Notify your health care provider if you experience any of the following:  persistent nausea and vomiting or diarrhea     Notify your health care provider if you experience any of the following:  severe uncontrolled pain     Notify your health care provider if you experience any of the following:  difficulty breathing or increased cough     Notify your health care provider if you experience any of the following:  severe persistent headache     Notify your health care provider if you  experience any of the following:  persistent dizziness, light-headedness, or visual disturbances     Notify your health care provider if you experience any of the following:  increased confusion or weakness     Activity as tolerated       Significant Diagnostic Studies: Labs: CMP   Recent Labs   Lab 02/24/25  1804 02/25/25  0541 02/26/25  0402    140 137   K 3.4* 3.7 3.8    102 102   CO2 26 31* 28    95 90   BUN 11 13 12   CREATININE 0.9 1.0 0.8   CALCIUM 8.3* 8.4* 8.3*   PROT 6.8  --   --    ALBUMIN 3.5  --   --    BILITOT 0.8  --   --    ALKPHOS 66  --   --    AST 17  --   --    ALT 11  --   --    ANIONGAP 9 7* 7*   , CBC   Recent Labs   Lab 02/24/25  1804 02/25/25  0541 02/26/25  0402   WBC 4.08 3.36* 2.92*   HGB 11.0* 11.6* 11.4*   HCT 36.5* 37.8* 37.3*    257 227   , and All labs within the past 24 hours have been reviewed    Pending Diagnostic Studies:       None           Medications:  Reconciled Home Medications:      Medication List        START taking these medications      empagliflozin 10 mg tablet  Commonly known as: Jardiance  Take 1 tablet (10 mg total) by mouth once daily.            CHANGE how you take these medications      furosemide 20 MG tablet  Commonly known as: LASIX  Take 2 tablets (40 mg total) by mouth once daily.  What changed: how much to take            CONTINUE taking these medications      acetaminophen 500 MG tablet  Commonly known as: TYLENOL  Take 500 mg by mouth every 6 (six) hours as needed for Pain.     amiodarone 200 MG Tab  Commonly known as: PACERONE  Take 1 tablet (200 mg total) by mouth once daily.     apixaban 5 mg Tab  Commonly known as: ELIQUIS  Take 5 mg by mouth 2 (two) times daily.     ascorbic acid (vitamin C) 500 MG tablet  Commonly known as: VITAMIN C  Take 500 mg by mouth once daily.     azelastine 137 mcg (0.1 %) nasal spray  Commonly known as: ASTELIN  2 sprays by Nasal route 2 (two) times daily.     ENTRESTO 24-26 mg per  tablet  Generic drug: sacubitriL-valsartan  TAKE 1 TABLET BY MOUTH TWICE DAILY     finasteride 5 mg tablet  Commonly known as: PROSCAR  Take 1 tablet (5 mg total) by mouth once daily.     levothyroxine 125 MCG tablet  Commonly known as: SYNTHROID  Take 125 mcg by mouth once daily.     metoprolol succinate 25 MG 24 hr tablet  Commonly known as: TOPROL-XL  Take 1 tablet (25 mg total) by mouth once daily.     nitroGLYCERIN 0.4 MG SL tablet  Commonly known as: NITROSTAT  Place 0.4 mg under the tongue every 5 (five) minutes as needed for Chest pain.     pantoprazole 40 MG tablet  Commonly known as: PROTONIX  Take 40 mg by mouth once daily.     spironolactone 25 MG tablet  Commonly known as: ALDACTONE  Take 1 tablet (25 mg total) by mouth once daily.     tamsulosin 0.4 mg Cap  Commonly known as: FLOMAX  Take 1 capsule by mouth every evening.            STOP taking these medications      clopidogreL 75 mg tablet  Commonly known as: PLAVIX              Indwelling Lines/Drains at time of discharge:   Lines/Drains/Airways       None                   Time spent on the discharge of patient: 35 minutes         Radha Jett NP  Department of Hospital Medicine  [facility]

## 2025-02-26 NOTE — PLAN OF CARE
Problem: Adult Inpatient Plan of Care  Goal: Plan of Care Review  Outcome: Progressing  Goal: Patient-Specific Goal (Individualized)  Outcome: Progressing  Goal: Absence of Hospital-Acquired Illness or Injury  Outcome: Progressing  Goal: Optimal Comfort and Wellbeing  Outcome: Progressing  Goal: Readiness for Transition of Care  Outcome: Progressing     Problem: Adult Inpatient Plan of Care  Goal: Plan of Care Review  Outcome: Progressing     Problem: Adult Inpatient Plan of Care  Goal: Patient-Specific Goal (Individualized)  Outcome: Progressing     Problem: Adult Inpatient Plan of Care  Goal: Absence of Hospital-Acquired Illness or Injury  Outcome: Progressing     Problem: Adult Inpatient Plan of Care  Goal: Optimal Comfort and Wellbeing  Outcome: Progressing     Problem: Adult Inpatient Plan of Care  Goal: Readiness for Transition of Care  Outcome: Progressing

## 2025-02-26 NOTE — PLAN OF CARE
Inbasket message sent to cardiology clinic requesting sooner follow up appointment, if available - clinic to contact patient with appointment date/time     02/26/25 0903   Post-Acute Status   Hospital Resources/Appts/Education Provided Appointments scheduled and added to AVS

## 2025-02-26 NOTE — CARE UPDATE
02/25/25 0747   Patient Assessment/Suction   Level of Consciousness (AVPU) alert   Respiratory Effort Normal   Expansion/Accessory Muscles/Retractions no use of accessory muscles   All Lung Fields Breath Sounds clear   PRE-TX-O2   Device (Oxygen Therapy) room air   SpO2 98 %   Pulse Oximetry Type Intermittent   $ Pulse Oximetry - Multiple Charge Pulse Oximetry - Multiple   Pulse 70   Resp 19   Aerosol Therapy   $ Aerosol Therapy Charges PRN treatment not required   Education   $ Education Bronchodilator;15 min   Respiratory Evaluation   $ Care Plan Tech Time 15 min

## 2025-02-26 NOTE — PROGRESS NOTES
[facility]  Department of Cardiology  Progress Note      PATIENT NAME: Trevor Madrid  MRN: [id number]  TODAY'S DATE: 02/26/2025  ADMIT DATE: 2/24/2025                          CONSULT REQUESTED BY: Juan Ramirez MD    SUBJECTIVE     PRINCIPAL PROBLEM: Acute on chronic combined systolic (congestive) and diastolic (congestive) heart failure      REASON FOR CONSULT:  CHF    INTERVAL HISTORY:  2/26/25    Pt dressed and prepared for DC home  Reportedly feeling better  No orthopnea, dyspnea or fluid retention    HPI:  Pt is 74 yo male with PMH: PAF, DCM, ICD in situ, CAD (Stents 2019), PAD (peripheral stents), HTN, hypothyroidism sarcoidosis, GERD who presented to ER yesterday with c/o shortness of breath starting this past Sunday or Monday. He states activity worsened the dyspnea. Also complains of leg pain and swelling. He is reportedly feeling better today. Reports compliance with medications.   Per hospitalist notes pt reported congestion, dry cough and rhinorrhea x 1 day      Review of patient's allergies indicates:  No Known Allergies    Past Medical History:   Diagnosis Date    A-fib     AICD (automatic cardioverter/defibrillator) present     Arthritis     Atrial fibrillation     Cancer     Cardiomyopathy     COPD (chronic obstructive pulmonary disease)     Heart failure     Histoplasmosis     Hypertension     Sarcoidosis     Sleep apnea     Thyroid disease      Past Surgical History:   Procedure Laterality Date    CARDIAC CATHETERIZATION      CARDIAC DEFIBRILLATOR PLACEMENT Left 01/2020    CORONARY ANGIOGRAPHY Left 9/16/2019    Procedure: CATHETERIZATION, HEART, LEFT (RIGHT RADIAL);  Surgeon: Gerald Romero MD;  Location: [facility] CATH/EP LAB;  Service: Cardiology;  Laterality: Left;    CORONARY STENT PLACEMENT      INSERTION OF BIVENTRICULAR IMPLANTABLE CARDIOVERTER-DEFIBRILLATOR (ICD)      TOTAL THYROIDECTOMY       Social History[1]     REVIEW OF SYSTEMS  Negative except as mentioned in  HPI    OBJECTIVE     VITAL SIGNS (Most Recent)  Temp: 97.7 °F (36.5 °C) (02/26/25 1143)  Pulse: 71 (02/26/25 1143)  Resp: 20 (02/26/25 0749)  BP: 91/61 (02/26/25 1143)  SpO2: 95 % (02/26/25 1143)    VENTILATION STATUS  Resp: 20 (02/26/25 0749)  SpO2: 95 % (02/26/25 1143)           I & O (Last 24H):  Intake/Output Summary (Last 24 hours) at 2/26/2025 1404  Last data filed at 2/26/2025 0028  Gross per 24 hour   Intake 240 ml   Output 400 ml   Net -160 ml       WEIGHTS  Wt Readings from Last 3 Encounters:   02/25/25 0125 63.1 kg (139 lb 3.2 oz)   02/24/25 1720 63 kg (139 lb)   02/25/25 1012 63.1 kg (139 lb 1.8 oz)   01/06/25 0929 65.3 kg (144 lb)       PHYSICAL EXAM    GENERAL: Elderly male breathing comfortably in no apparent distress.  HEENT: Normocephalic.    NECK: No JVD.   CARDIAC: Regular rate and rhythm. S1 is normal.S2 is normal.+ murmur; Paced rhythm on telemetry  CHEST ANATOMY: normal.   LUNGS: Diminished breath sounds to bases   ABDOMEN: Soft .  Normal bowel sounds.    EXTREMITIES: No edema  CENTRAL NERVOUS SYSTEM: AAO x 3  SKIN: No rash     HOME MEDICATIONS:Medications Ordered Prior to Encounter[2]    SCHEDULED MEDS:   amiodarone  200 mg Oral Daily    apixaban  5 mg Oral BID    ascorbic acid (vitamin C)  500 mg Oral Daily    empagliflozin  10 mg Oral Daily    finasteride  5 mg Oral Daily    furosemide (LASIX) injection  20 mg Intravenous Q12H    levothyroxine  125 mcg Oral Before breakfast    metoprolol succinate  25 mg Oral Daily    pantoprazole  40 mg Oral Daily    sacubitriL-valsartan  1 tablet Oral BID    spironolactone  25 mg Oral Daily    tamsulosin  1 capsule Oral QHS       CONTINUOUS INFUSIONS:    PRN MEDS:  Current Facility-Administered Medications:     acetaminophen, 650 mg, Oral, Q4H PRN    albuterol-ipratropium, 3 mL, Nebulization, Q6H PRN    aluminum-magnesium hydroxide-simethicone, 30 mL, Oral, QID PRN    dextrose 50%, 12.5 g, Intravenous, PRN    dextrose 50%, 25 g, Intravenous, PRN     "glucagon (human recombinant), 1 mg, Intramuscular, PRN    glucose, 16 g, Oral, PRN    glucose, 24 g, Oral, PRN    magnesium oxide, 800 mg, Oral, PRN    magnesium oxide, 800 mg, Oral, PRN    melatonin, 9 mg, Oral, Nightly PRN    naloxone, 0.02 mg, Intravenous, PRN    ondansetron, 4 mg, Intravenous, Q6H PRN    potassium bicarbonate, 35 mEq, Oral, PRN    potassium bicarbonate, 50 mEq, Oral, PRN    potassium bicarbonate, 60 mEq, Oral, PRN    potassium, sodium phosphates, 2 packet, Oral, PRN    potassium, sodium phosphates, 2 packet, Oral, PRN    potassium, sodium phosphates, 2 packet, Oral, PRN    senna-docusate 8.6-50 mg, 1 tablet, Oral, BID PRN    sodium chloride 0.9%, 10 mL, Intravenous, PRN    LABS AND DIAGNOSTICS     CBC LAST 3 DAYS  Recent Labs   Lab 02/24/25  1804 02/25/25  0541 02/26/25  0402   WBC 4.08 3.36* 2.92*   RBC 4.50* 4.71 4.68   HGB 11.0* 11.6* 11.4*   HCT 36.5* 37.8* 37.3*   MCV 81* 80* 80*   MCH 24.4* 24.6* 24.4*   MCHC 30.1* 30.7* 30.6*   RDW 15.9* 16.0* 16.1*    257 227   MPV 11.2 11.4 10.4   GRAN 74.1*  3.0 62.7  2.1 61.3  1.8   LYMPH 10.3*  0.4* 18.5  0.6* 18.2  0.5*   MONO 13.2  0.5 14.9  0.5 15.4*  0.5   BASO 0.05 0.05 0.05   NRBC 0 0 0       COAGULATION LAST 3 DAYS  No results for input(s): "LABPT", "INR", "APTT" in the last 168 hours.    CHEMISTRY LAST 3 DAYS  Recent Labs   Lab 02/24/25  1804 02/25/25  0541 02/26/25  0402    140 137   K 3.4* 3.7 3.8    102 102   CO2 26 31* 28   ANIONGAP 9 7* 7*   BUN 11 13 12   CREATININE 0.9 1.0 0.8    95 90   CALCIUM 8.3* 8.4* 8.3*   MG  --  2.2 2.1   ALBUMIN 3.5  --   --    PROT 6.8  --   --    ALKPHOS 66  --   --    ALT 11  --   --    AST 17  --   --    BILITOT 0.8  --   --        CARDIAC PROFILE LAST 3 DAYS  Recent Labs   Lab 02/24/25  1804   BNP 1,062*   *       ENDOCRINE LAST 3 DAYS  No results for input(s): "TSH", "PROCAL" in the last 168 hours.    LAST ARTERIAL BLOOD GAS  ABG  No results for input(s): "PH", " ""PO2", "PCO2", "HCO3", "BE" in the last 168 hours.    LAST 7 DAYS MICROBIOLOGY   Microbiology Results (last 7 days)       ** No results found for the last 168 hours. **            MOST RECENT IMAGING  US Lower Extremity Arteries Bilateral  Narrative: EXAMINATION:  US LOWER EXTREMITY ARTERIES BILATERAL    CLINICAL HISTORY:  BLE claudication;    TECHNIQUE:  Grayscale, color and spectral Doppler analysis of the bilateral lower extremity arteries was performed.    COMPARISON:  None    FINDINGS:  Bilateral common femoral, profundus femoris, superficial femoral, popliteal, anterior tibial, posterior tibial, peroneal, and dorsalis pedis arteries are patent.  There are no markedly elevated peak systolic velocities to suggest hemodynamically significant stenosis.  Spectral Doppler waveforms are primarily triphasic.  Scattered calcific peripheral arterial plaque.  Impression: Negative for bilateral lower extremity arterial occlusion.    Grayscale findings of peripheral arterial vascular disease bilaterally.    No Doppler evidence of focal hemodynamically significant stenosis.    Electronically signed by: Trevor Mitchell  Date:    02/26/2025  Time:    13:25      ECHOCARDIOGRAM RESULTS (last 5)  Results for orders placed during the hospital encounter of 02/24/25    Echo    Interpretation Summary    Left Ventricle: The left ventricle is severely dilated. Normal wall thickness. Severe global hypokinesis present. There is severely reduced systolic function with a visually estimated ejection fraction of 25 - 30%.    Right Ventricle: The right ventricle is normal in size. Systolic function is normal.    Aortic Valve: There is moderate aortic regurgitation.    IVC/SVC: Normal venous pressure at 3 mmHg.      Results for orders placed during the hospital encounter of 05/07/24    Echo    Interpretation Summary    Left Ventricle: The left ventricle is mildly dilated. Mildly increased wall thickness. Severe global hypokinesis present. There " is severely reduced systolic function with a visually estimated ejection fraction of 15 - 20%. There is diastolic dysfunction but grade cannot be determined.    Right Ventricle: Normal right ventricular cavity size. Systolic function is normal.    Left Atrium: Left atrium is moderately dilated.    Right Atrium: Right atrium is mildly dilated.    Aortic Valve: . There is severe aortic regurgitation.    Mitral Valve:  There is mild regurgitation.    Pulmonic Valve: There is moderate regurgitation.    IVC/SVC: Intermediate venous pressure at 8 mmHg.      Results for orders placed during the hospital encounter of 12/08/22    Echo    Interpretation Summary  · The left ventricle is mildly enlarged with mild concentric hypertrophy and severely decreased systolic function.  · The estimated ejection fraction is 20%.  · Grade I left ventricular diastolic dysfunction.  · There is severe left ventricular global hypokinesis.  · Normal right ventricular size with normal right ventricular systolic function.  · Mild tricuspid regurgitation.  · Moderate-to-severe aortic regurgitation.  · Mild mitral regurgitation.  · Normal central venous pressure (3 mmHg).  · The estimated PA systolic pressure is 14 mmHg.  · There is no pulmonary hypertension.      CURRENT/PREVIOUS VISIT EKG  Results for orders placed or performed during the hospital encounter of 02/24/25   EKG 12-lead    Collection Time: 02/24/25  5:17 PM   Result Value Ref Range    QRS Duration 106 ms    OHS QTC Calculation 491 ms    Narrative    Test Reason : R07.9,    Vent. Rate :  80 BPM     Atrial Rate :  80 BPM     P-R Int : 202 ms          QRS Dur : 106 ms      QT Int : 426 ms       P-R-T Axes :  11  37 220 degrees    QTcB Int : 491 ms    Atrial-paced rhythm  LVH with repolarization abnormality ( Sokolow-Morocho , Romhilt-Mitchell )  Prolonged QT  Abnormal ECG  No previous ECGs available    Referred By: AAAREFERRAL SELF           Confirmed By:            ASSESSMENT/PLAN:      Active Hospital Problems    Diagnosis    *Acute on chronic combined systolic (congestive) and diastolic (congestive) heart failure    Mitral valve insufficiency and aortic valve insufficiency    Thyroid disease    ICD (implantable cardioverter-defibrillator), dual, in situ    Hypertension    Sarcoidosis of lung with sarcoidosis of lymph nodes    Atherosclerotic heart disease of native coronary artery without angina pectoris    Paroxysmal atrial fibrillation    COPD (chronic obstructive pulmonary disease)       ASSESSMENT & PLAN:   Acute on chronic combined heart failure  Dilated Cardiomyopathy  ICD in situ  PAF  CAD, H/O PCI 2019  Aortic regurgitation (moderate)    RECOMMENDATIONS:  Presented with HFrEF exacerbation- Efx 25-30%  Resolution of symptoms with IV diuresis    Discussed importance of daily weight and instructed to take additional diuretic for weight gain > 2-3 lbs in 24 hours    Optimize GDMT regimen:  Continue Entresto 24-26 mg BID, Spironolactone 25 mg daily, Toprol XL 25 mg daily  Add SLGT2i Jardiance 10 mg daily  D/C on diuretic: Lasix 20 mg daily; creatinine 0.8 today      Continue Eliquis for PAF/CVA prophylaxis and amiodarone 200 mg daily  OK to discharge home today with outpatient follow up with cardiology in 2 weeks      Stacey Aguayo NP  [facility]  Department of Cardiology  Date of Service: 02/26/2025                 [1]   Social History  Tobacco Use    Smoking status: Former     Current packs/day: 0.00     Types: Cigarettes     Quit date: 9/12/2014     Years since quitting: 10.4    Smokeless tobacco: Former   Substance Use Topics    Alcohol use: Not Currently    Drug use: Never   [2]   No current facility-administered medications on file prior to encounter.     Current Outpatient Medications on File Prior to Encounter   Medication Sig Dispense Refill    acetaminophen (TYLENOL) 500 MG tablet Take 500 mg by mouth every 6 (six) hours as needed for Pain.      amiodarone  (PACERONE) 200 MG Tab Take 1 tablet (200 mg total) by mouth once daily. 30 tablet 0    apixaban (ELIQUIS) 5 mg Tab Take 5 mg by mouth 2 (two) times daily.      ascorbic acid, vitamin C, (VITAMIN C) 500 MG tablet Take 500 mg by mouth once daily.      clopidogreL (PLAVIX) 75 mg tablet Take 1 tablet (75 mg total) by mouth once daily. 30 tablet 0    ENTRESTO 24-26 mg per tablet TAKE 1 TABLET BY MOUTH TWICE DAILY 180 tablet 3    finasteride (PROSCAR) 5 mg tablet Take 1 tablet (5 mg total) by mouth once daily. 30 tablet 0    furosemide (LASIX) 20 MG tablet Take 1 tablet (20 mg total) by mouth once daily. 30 tablet 0    levothyroxine (SYNTHROID) 125 MCG tablet Take 125 mcg by mouth once daily.      metoprolol succinate (TOPROL-XL) 25 MG 24 hr tablet Take 1 tablet (25 mg total) by mouth once daily. 30 tablet 0    nitroGLYCERIN (NITROSTAT) 0.4 MG SL tablet Place 0.4 mg under the tongue every 5 (five) minutes as needed for Chest pain.      pantoprazole (PROTONIX) 40 MG tablet Take 40 mg by mouth once daily.      spironolactone (ALDACTONE) 25 MG tablet Take 1 tablet (25 mg total) by mouth once daily. 30 tablet 0    tamsulosin (FLOMAX) 0.4 mg Cap Take 1 capsule by mouth every evening.      azelastine (ASTELIN) 137 mcg (0.1 %) nasal spray 2 sprays by Nasal route 2 (two) times daily.

## 2025-02-26 NOTE — TELEPHONE ENCOUNTER
----- Message from  Lanny sent at 2/26/2025  9:00 AM CST -----  Good morningApt noted with Kimberly Nichole on March 20th. Patient discharging from [facility] after CHF exacerbation - please reschedule appt to sooner date, if available.Thank Osvaldo GAY

## 2025-03-20 ENCOUNTER — OFFICE VISIT (OUTPATIENT)
Dept: CARDIOLOGY | Facility: CLINIC | Age: 75
End: 2025-03-20
Payer: MEDICARE

## 2025-03-20 VITALS
DIASTOLIC BLOOD PRESSURE: 77 MMHG | OXYGEN SATURATION: 98 % | HEART RATE: 87 BPM | WEIGHT: 144.63 LBS | SYSTOLIC BLOOD PRESSURE: 116 MMHG | BODY MASS INDEX: 21.99 KG/M2

## 2025-03-20 DIAGNOSIS — Z95.810 PRESENCE OF AUTOMATIC (IMPLANTABLE) CARDIAC DEFIBRILLATOR: ICD-10-CM

## 2025-03-20 DIAGNOSIS — I50.20 SYSTOLIC CONGESTIVE HEART FAILURE, UNSPECIFIED HF CHRONICITY: ICD-10-CM

## 2025-03-20 DIAGNOSIS — I42.0 DILATED CARDIOMYOPATHY: ICD-10-CM

## 2025-03-20 DIAGNOSIS — D86.85 SARCOID MYOCARDITIS: ICD-10-CM

## 2025-03-20 DIAGNOSIS — I73.9 PAD (PERIPHERAL ARTERY DISEASE): Primary | ICD-10-CM

## 2025-03-20 DIAGNOSIS — I48.0 PAROXYSMAL ATRIAL FIBRILLATION: ICD-10-CM

## 2025-03-20 PROCEDURE — 99999 PR PBB SHADOW E&M-EST. PATIENT-LVL IV: CPT | Mod: PBBFAC,,, | Performed by: NURSE PRACTITIONER

## 2025-03-20 NOTE — ASSESSMENT & PLAN NOTE
Patient is euvolemic  Continue entresto 24/26 mg PO BID  Continue Amiodarone 200 mg daily  Continue Lasix 40 mg daily

## 2025-03-20 NOTE — ASSESSMENT & PLAN NOTE
Echo    Interpretation Summary    Left Ventricle: The left ventricle is severely dilated. Normal wall thickness. Severe global hypokinesis present. There is severely reduced systolic function with a visually estimated ejection fraction of 25 - 30%.    Right Ventricle: The right ventricle is normal in size. Systolic function is normal.    Aortic Valve: There is moderate aortic regurgitation.    IVC/SVC: Normal venous pressure at 3 mmHg.    NOTED SEE CARDIOMYOPATHY

## 2025-03-20 NOTE — PROGRESS NOTES
Subjective:    Patient ID:  Trevor Madrid is a 75 y.o. male who presents for follow-up   Chief Complaint   Patient presents with    Follow-up    Hypertension    Atrial Fibrillation       HPI:      DC SUMMARY FOR MY CONVENIENCE    HPI:   Mr. Madrid is a 75 yr old male with a hx of PAF on long-term anti coag with Eliquis, HTN, ICD placement, thyroidectomy thyroid disease, RA, mitral valve insufficiency and aortic valve insufficiency, CAD s/p PCI, COPD, sarcoidosis, congestive heart failure, cardiomyopathy, anemia, dyslipidemia, abdominal aortic ectasia, BPH, GERD, osteoarthritis, cancer, histoplasmosis, sleep apnea who presented to the ED with a chief complaint of shortness of breath.  Patient endorses 1 day history of shortness of breath that began gradually and progressively worsened since.  He also endorses a did congestion, rhinorrhea, dry cough. He endorses his symptoms are worse with exertion and better at rest.  He endorses he has been taking his home Flonase with only temporary relief.  He denies recent sick contacts, home O2, PND.  He does endorse orthopnea and states that he sleeps sitting up.  He also endorses he ran out of his spironolactone about a week ago.  He denies tobacco, alcohol, and recreational drug use.  He denies fever, chills, chest pain, abdominal pain, nausea, vomiting, diarrhea, dysuria, hematuria, lightheadedness, dizziness, and syncope.     Upon arrival to ED, patient afebrile, HR of 80, RR of 16, BP of 127/81, satting 97% on RA.  Workup in the ED revealed RBC of 4.50, H/H of 11/36.5, potassium of 3.4, BNP of 1062, CPK of 260, initial troponin of 37.6, repeat troponin of 41.6.  CXR shows left chest wall cardiac pacing device.  Stable size and configuration of the cardiomediastinal silhouette.  Aortic atherosclerosis.  Lungs demonstrate bilateral coarse interstitial attenuation with chronic multifocal upper lung zone airspace opacities, relatively stable compared to prior study.  No new large  confluent airspace consolidation.  No significant volume of pleural fluid or definitive pneumothorax.  Patient was given Lasix 40 mg IV, Mag sulfate 1 g IV, potassium bicarb 50 mEq oral while in the ED. case discussed with ED provider and patient will be placed under observation for further management.          Hospital Course:   Patient monitored closely during hospitalization.  He was admitted for CHF exacerbation.  Cardiology was consulted.  He was diuresed with IV lasix and had significant improvement in his symptoms.  He has been cleared for discharge home by Cardiology.  Patient seen and examined on day of discharge and deemed stable for discharge home.  He is to follow up with PCP and Cardiology in 1-2 weeks.  Patient agreeable to discharge plan.         Goals of Care Treatment Preferences:  Code Status: Full Code        SDOH Screening:  The patient was screened for utility difficulties, food insecurity, transport difficulties, housing insecurity, and interpersonal safety and there were no concerns identified this admission.     Review of patient's allergies indicates:  No Known Allergies    Past Medical History:   Diagnosis Date    A-fib     AICD (automatic cardioverter/defibrillator) present     Arthritis     Atrial fibrillation     Cancer     Cardiomyopathy     COPD (chronic obstructive pulmonary disease)     Heart failure     Histoplasmosis     Hypertension     Sarcoidosis     Sleep apnea     Thyroid disease      Past Surgical History:   Procedure Laterality Date    CARDIAC CATHETERIZATION      CARDIAC DEFIBRILLATOR PLACEMENT Left 01/2020    CORONARY ANGIOGRAPHY Left 9/16/2019    Procedure: CATHETERIZATION, HEART, LEFT (RIGHT RADIAL);  Surgeon: Gerald Romero MD;  Location: [facility] CATH/EP LAB;  Service: Cardiology;  Laterality: Left;    CORONARY STENT PLACEMENT      INSERTION OF BIVENTRICULAR IMPLANTABLE CARDIOVERTER-DEFIBRILLATOR (ICD)      TOTAL THYROIDECTOMY       Social History[1]  Family  History   Problem Relation Name Age of Onset    Hypertension Father          Review of Systems:   PER HPI         Objective:        Vitals:    03/20/25 1312   BP: 116/77   Pulse: 87       Lab Results   Component Value Date    WBC 2.92 (L) 02/26/2025    HGB 11.4 (L) 02/26/2025    HCT 37.3 (L) 02/26/2025     02/26/2025    CHOL 180 10/20/2022    TRIG 54 10/20/2022    HDL 44 10/20/2022    ALT 11 02/24/2025    AST 17 02/24/2025     02/26/2025    K 3.8 02/26/2025     02/26/2025    CREATININE 0.8 02/26/2025    BUN 12 02/26/2025    CO2 28 02/26/2025    TSH 5.746 (H) 05/07/2024    INR 1.3 12/08/2022        ECHOCARDIOGRAM RESULTS  Results for orders placed during the hospital encounter of 02/24/25    Echo    Interpretation Summary    Left Ventricle: The left ventricle is severely dilated. Normal wall thickness. Severe global hypokinesis present. There is severely reduced systolic function with a visually estimated ejection fraction of 25 - 30%.    Right Ventricle: The right ventricle is normal in size. Systolic function is normal.    Aortic Valve: There is moderate aortic regurgitation.    IVC/SVC: Normal venous pressure at 3 mmHg.        CURRENT/PREVIOUS VISIT EKG  Results for orders placed or performed during the hospital encounter of 02/24/25   EKG 12-lead    Collection Time: 02/24/25  5:17 PM   Result Value Ref Range    QRS Duration 106 ms    OHS QTC Calculation 491 ms    Narrative    Test Reason : R07.9,    Vent. Rate :  80 BPM     Atrial Rate :  80 BPM     P-R Int : 202 ms          QRS Dur : 106 ms      QT Int : 426 ms       P-R-T Axes :  11  37 220 degrees    QTcB Int : 491 ms    Atrial-paced rhythm  LVH with repolarization abnormality ( Sokolow-Morocho , Romhilt-Mitchell )  Prolonged QT  Abnormal ECG  No previous ECGs available  Confirmed by Nathaniel West ([id number]) on 2/26/2025 10:02:28 PM    Referred By: AAAREFERRAL SELF           Confirmed By: Nathaniel West         Physical Exam:  CONSTITUTIONAL: No  fever, no chills  HEENT: Normocephalic, atraumatic,pupils reactive to light                 NECK:  No JVD no carotid bruit  CVS: S1S2+, RRR, no murmurs,   LUNGS: Clear  ABDOMEN: Soft, NT, BS+  EXTREMITIES: No cyanosis, edema  : No hannon catheter  NEURO: AAO X 3  PSY: Normal affect      Medication List with Changes/Refills   Current Medications    ACETAMINOPHEN (TYLENOL) 500 MG TABLET    Take 500 mg by mouth every 6 (six) hours as needed for Pain.    AMIODARONE (PACERONE) 200 MG TAB    Take 1 tablet (200 mg total) by mouth once daily.    APIXABAN (ELIQUIS) 5 MG TAB    Take 5 mg by mouth 2 (two) times daily.    ASCORBIC ACID, VITAMIN C, (VITAMIN C) 500 MG TABLET    Take 500 mg by mouth once daily.    AZELASTINE (ASTELIN) 137 MCG (0.1 %) NASAL SPRAY    2 sprays by Nasal route 2 (two) times daily.    EMPAGLIFLOZIN (JARDIANCE) 10 MG TABLET    Take 1 tablet (10 mg total) by mouth once daily.    ENTRESTO 24-26 MG PER TABLET    TAKE 1 TABLET BY MOUTH TWICE DAILY    FINASTERIDE (PROSCAR) 5 MG TABLET    Take 1 tablet (5 mg total) by mouth once daily.    FUROSEMIDE (LASIX) 20 MG TABLET    Take 2 tablets (40 mg total) by mouth once daily.    LEVOTHYROXINE (SYNTHROID) 125 MCG TABLET    Take 125 mcg by mouth once daily.    METOPROLOL SUCCINATE (TOPROL-XL) 25 MG 24 HR TABLET    Take 1 tablet (25 mg total) by mouth once daily.    NITROGLYCERIN (NITROSTAT) 0.4 MG SL TABLET    Place 0.4 mg under the tongue every 5 (five) minutes as needed for Chest pain.    PANTOPRAZOLE (PROTONIX) 40 MG TABLET    Take 40 mg by mouth once daily.    SPIRONOLACTONE (ALDACTONE) 25 MG TABLET    Take 1 tablet (25 mg total) by mouth once daily.    TAMSULOSIN (FLOMAX) 0.4 MG CAP    Take 1 capsule by mouth every evening.             Assessment:       1. PAD (peripheral artery disease)    2. Systolic congestive heart failure, unspecified HF chronicity    3. Presence of automatic (implantable) cardiac defibrillator    4. Dilated cardiomyopathy    5.  Paroxysmal atrial fibrillation    6. Sarcoid myocarditis         Plan:     Problem List Items Addressed This Visit       Cardiomyopathy    Current Assessment & Plan   Patient is euvolemic  Continue entresto 24/26 mg PO BID  Continue Amiodarone 200 mg daily  Continue Lasix 40 mg daily         Paroxysmal atrial fibrillation    Current Assessment & Plan   Continue Amiodarone daily  Eliquis 5 mg PO BID  Metoprolol 25 mg daily           Presence of automatic (implantable) cardiac defibrillator    PAD (peripheral artery disease) - Primary    Relevant Orders    Ankle Brachial Indices (LEVY)    Systolic congestive heart failure    Current Assessment & Plan     Echo    Interpretation Summary    Left Ventricle: The left ventricle is severely dilated. Normal wall thickness. Severe global hypokinesis present. There is severely reduced systolic function with a visually estimated ejection fraction of 25 - 30%.    Right Ventricle: The right ventricle is normal in size. Systolic function is normal.    Aortic Valve: There is moderate aortic regurgitation.    IVC/SVC: Normal venous pressure at 3 mmHg.    NOTED SEE CARDIOMYOPATHY           Relevant Orders    Comprehensive Metabolic Panel    BNP       No follow-ups on file.       Kimberly Nichole, CRISTY-ACNP, CVNP-BC                 [1]   Social History  Tobacco Use    Smoking status: Former     Current packs/day: 0.00     Types: Cigarettes     Quit date: 9/12/2014     Years since quitting: 10.5    Smokeless tobacco: Former   Substance Use Topics    Alcohol use: Not Currently    Drug use: Never

## 2025-03-24 ENCOUNTER — TELEPHONE (OUTPATIENT)
Dept: CARDIOLOGY | Facility: CLINIC | Age: 75
End: 2025-03-24
Payer: MEDICARE

## 2025-03-24 NOTE — TELEPHONE ENCOUNTER
----- Message from Raymon sent at 3/24/2025  3:05 PM CDT -----  Regarding: return call  Contact: patient  Type:  Patient Returning CallWho Called:patientWho Left Message for Patient:staffDoes the patient know what this is regarding?:scheduling testWould the patient rather a call back or a response via [employer]? Best Call Back Number:[phone] Information:

## 2025-03-24 NOTE — TELEPHONE ENCOUNTER
Informed the patient that the referral for LEVY has to be approve by pre service it was suggested to wait a couple days and then call scheduling at [phone] that scan

## 2025-03-26 ENCOUNTER — TELEPHONE (OUTPATIENT)
Dept: CARDIOLOGY | Facility: CLINIC | Age: 75
End: 2025-03-26
Payer: MEDICARE

## 2025-03-26 NOTE — TELEPHONE ENCOUNTER
PT CALLED DUE TO HEARING TUNE ON ICD. TRIED TO DO REMOTE TRANSMISSION BUT IT WOULDN'T COME THRU. SPOKE WITH MICHELINE AND SHE SAID BRING PT IN TOMORROW AT 1045AM  SO WE CAN CHECK. PT UNDERSTOOD.

## 2025-03-27 ENCOUNTER — HOSPITAL ENCOUNTER (OUTPATIENT)
Dept: CARDIOLOGY | Facility: CLINIC | Age: 75
Discharge: HOME | End: 2025-03-27
Attending: INTERNAL MEDICINE
Payer: MEDICARE

## 2025-03-27 ENCOUNTER — CLINICAL SUPPORT (OUTPATIENT)
Dept: CARDIOLOGY | Facility: CLINIC | Age: 75
End: 2025-03-27

## 2025-03-27 DIAGNOSIS — Z95.810 PRESENCE OF AUTOMATIC (IMPLANTABLE) CARDIAC DEFIBRILLATOR: ICD-10-CM

## 2025-03-27 DIAGNOSIS — Z95.810 ICD (IMPLANTABLE CARDIOVERTER-DEFIBRILLATOR), DUAL, IN SITU: ICD-10-CM

## 2025-03-27 DIAGNOSIS — I49.8 OTHER SPECIFIED CARDIAC ARRHYTHMIAS: ICD-10-CM

## 2025-03-27 LAB
IMPEDANCE RA LEAD (NATIVE): 342 OHMS
IMPEDANCE RA LEAD: 399 OHMS
THRESHOLD MS RA LEAD (NATIVE): 0.4 MS
THRESHOLD MS RA LEAD: 0.4 MS
THRESHOLD V RA LEAD (NATIVE): 2.12 V
THRESHOLD V RA LEAD: 0.62 V

## 2025-04-07 NOTE — Clinical Note
The skin was closed with suture. Bill For Surgical Tray: no Billing Type: Third-Party Bill Expected Date Of Service: 04/07/2025

## 2025-04-29 NOTE — INTERVAL H&P NOTE
The patient has been examined and the H&P has been reviewed:    I concur with the findings and no changes have occurred since H&P was written.    Anesthesia/Surgery risks, benefits and alternative options discussed and understood by patient/family.          Active Hospital Problems    Diagnosis  POA    Cardiomyopathy [I42.9]  Yes     Priority: High      Resolved Hospital Problems   No resolved problems to display.      Usage Warning: This plan is only intended for use with surgical procedures which generate a 90 day global period.  These procedures are listed below.  Use of this plan for other procedures (Mohs surgery or excision with a different repair than listed below for example) is inappropriate and will increase your risk of failing an audit.

## 2025-05-06 ENCOUNTER — CLINICAL SUPPORT (OUTPATIENT)
Dept: CARDIOLOGY | Facility: CLINIC | Age: 75
End: 2025-05-06

## 2025-05-06 ENCOUNTER — HOSPITAL ENCOUNTER (OUTPATIENT)
Dept: CARDIOLOGY | Facility: CLINIC | Age: 75
Discharge: HOME | End: 2025-05-06
Attending: INTERNAL MEDICINE
Payer: MEDICARE

## 2025-05-06 DIAGNOSIS — I49.8 OTHER SPECIFIED CARDIAC ARRHYTHMIAS: ICD-10-CM

## 2025-05-06 DIAGNOSIS — Z95.810 PRESENCE OF AUTOMATIC (IMPLANTABLE) CARDIAC DEFIBRILLATOR: ICD-10-CM

## 2025-05-06 PROCEDURE — 93295 DEV INTERROG REMOTE 1/2/MLT: CPT | Mod: S$GLB,,, | Performed by: INTERNAL MEDICINE

## 2025-05-06 PROCEDURE — 93296 REM INTERROG EVL PM/IDS: CPT | Mod: PN | Performed by: INTERNAL MEDICINE

## 2025-06-13 DIAGNOSIS — Z95.810 ICD (IMPLANTABLE CARDIOVERTER-DEFIBRILLATOR), DUAL, IN SITU: Primary | ICD-10-CM

## 2025-08-01 LAB
OHS CV AF BURDEN PERCENT: < 1
OHS CV AF BURDEN PERCENT: < 1
OHS CV DC REMOTE DEVICE TYPE: NORMAL
OHS CV DC REMOTE DEVICE TYPE: NORMAL
OHS CV ICD SHOCK: NO
OHS CV RV PACING PERCENT: 4.26 %
OHS CV RV PACING PERCENT: 5.96 %

## 2025-08-05 ENCOUNTER — HOSPITAL ENCOUNTER (OUTPATIENT)
Dept: CARDIOLOGY | Facility: CLINIC | Age: 75
Discharge: HOME | End: 2025-08-05
Attending: INTERNAL MEDICINE
Payer: MEDICARE

## 2025-08-05 ENCOUNTER — CLINICAL SUPPORT (OUTPATIENT)
Dept: CARDIOLOGY | Facility: CLINIC | Age: 75
End: 2025-08-05
Payer: MEDICARE

## 2025-08-05 DIAGNOSIS — I49.8 OTHER SPECIFIED CARDIAC ARRHYTHMIAS: ICD-10-CM

## 2025-08-05 DIAGNOSIS — Z95.810 PRESENCE OF AUTOMATIC (IMPLANTABLE) CARDIAC DEFIBRILLATOR: ICD-10-CM

## 2025-08-05 PROCEDURE — 93296 REM INTERROG EVL PM/IDS: CPT | Mod: PN | Performed by: INTERNAL MEDICINE

## 2025-08-05 PROCEDURE — 93295 DEV INTERROG REMOTE 1/2/MLT: CPT | Mod: S$GLB,,, | Performed by: INTERNAL MEDICINE

## 2025-08-22 LAB
OHS CV AF BURDEN PERCENT: < 1
OHS CV DC REMOTE DEVICE TYPE: NORMAL
OHS CV ICD SHOCK: NO
OHS CV RV PACING PERCENT: 4.95 %

## (undated) DEVICE — Device

## (undated) DEVICE — GUIDEWIRE J TIP BMW .014X190

## (undated) DEVICE — HEMOSTAT FLOWABLE DRY 4000

## (undated) DEVICE — KIT MICROINTRODUCE MINI 5X10CM

## (undated) DEVICE — CATHETER RADIAL TIG 4.0 OPTITORQUE 6X110

## (undated) DEVICE — SHEATH SAFE ULTRA 7FR

## (undated) DEVICE — SUT 4/0 18IN COATED VICRYL

## (undated) DEVICE — SHEATH SAFE ULTRA 9FR

## (undated) DEVICE — SUT CTD VICRYL CT-1 UND BR

## (undated) DEVICE — SHEATH INTRODUCER SLENDER 6FX10CM

## (undated) DEVICE — GUIDEWIRE REG. ANGLED .035X260 LAUREATE

## (undated) DEVICE — CATHETER GUIDE LAUNCHER EBU3.5 5FX100CM

## (undated) DEVICE — CATHETER BALLOON EUPHORA 2.50X15MM

## (undated) DEVICE — VALVE BLEEDBACK CONTROL 1003330

## (undated) DEVICE — DRESSING AQUACEL AG W/SILVER 3.5X6

## (undated) DEVICE — CATHETER MICRO NAVVUS FFR ACIST

## (undated) DEVICE — CATHETER EXPO MLTPK 5FX100-110CM

## (undated) DEVICE — GUIDEWIRE VERRATA J-TIP 3CMX3CMX36MM

## (undated) DEVICE — SUT VICRYL 3-0 27 SH